# Patient Record
Sex: FEMALE | Race: WHITE | Employment: UNEMPLOYED | URBAN - METROPOLITAN AREA
[De-identification: names, ages, dates, MRNs, and addresses within clinical notes are randomized per-mention and may not be internally consistent; named-entity substitution may affect disease eponyms.]

---

## 2022-07-18 ENCOUNTER — HOSPITAL ENCOUNTER (EMERGENCY)
Age: 52
Discharge: HOME | End: 2022-07-19
Payer: MEDICARE

## 2022-07-18 ENCOUNTER — APPOINTMENT (OUTPATIENT)
Dept: CT IMAGING | Age: 52
End: 2022-07-18
Payer: MEDICARE

## 2022-07-18 DIAGNOSIS — R55 PRE-SYNCOPE: Primary | ICD-10-CM

## 2022-07-18 DIAGNOSIS — G93.89 ENCEPHALOMALACIA ON IMAGING STUDY: ICD-10-CM

## 2022-07-18 DIAGNOSIS — I69.30 HISTORY OF ISCHEMIC CEREBROVASCULAR ACCIDENT (CVA) WITH RESIDUAL DEFICIT: ICD-10-CM

## 2022-07-18 LAB
ANION GAP SERPL CALCULATED.3IONS-SCNC: 14 MEQ/L (ref 9–15)
APTT: 30 SEC (ref 24.4–36.8)
BASOPHILS ABSOLUTE: 0.1 K/UL (ref 0–0.2)
BASOPHILS RELATIVE PERCENT: 0.7 %
BUN BLDV-MCNC: 7 MG/DL (ref 6–20)
CALCIUM SERPL-MCNC: 9.5 MG/DL (ref 8.5–9.9)
CHLORIDE BLD-SCNC: 99 MEQ/L (ref 95–107)
CO2: 23 MEQ/L (ref 20–31)
CREAT SERPL-MCNC: 0.47 MG/DL (ref 0.5–0.9)
EOSINOPHILS ABSOLUTE: 0.2 K/UL (ref 0–0.7)
EOSINOPHILS RELATIVE PERCENT: 2.3 %
GFR AFRICAN AMERICAN: >60
GFR NON-AFRICAN AMERICAN: >60
GLUCOSE BLD-MCNC: 96 MG/DL (ref 70–99)
HCT VFR BLD CALC: 37.6 % (ref 37–47)
HEMOGLOBIN: 12.1 G/DL (ref 12–16)
INR BLD: 0.9
LYMPHOCYTES ABSOLUTE: 1.5 K/UL (ref 1–4.8)
LYMPHOCYTES RELATIVE PERCENT: 16 %
MCH RBC QN AUTO: 26.8 PG (ref 27–31.3)
MCHC RBC AUTO-ENTMCNC: 32.3 % (ref 33–37)
MCV RBC AUTO: 83.1 FL (ref 82–100)
MONOCYTES ABSOLUTE: 0.6 K/UL (ref 0.2–0.8)
MONOCYTES RELATIVE PERCENT: 6 %
NEUTROPHILS ABSOLUTE: 7.2 K/UL (ref 1.4–6.5)
NEUTROPHILS RELATIVE PERCENT: 75 %
PDW BLD-RTO: 16.2 % (ref 11.5–14.5)
PLATELET # BLD: 272 K/UL (ref 130–400)
POTASSIUM REFLEX MAGNESIUM: 4.3 MEQ/L (ref 3.4–4.9)
PROTHROMBIN TIME: 12.6 SEC (ref 12.3–14.9)
RBC # BLD: 4.52 M/UL (ref 4.2–5.4)
SODIUM BLD-SCNC: 136 MEQ/L (ref 135–144)
TROPONIN: <0.01 NG/ML (ref 0–0.01)
WBC # BLD: 9.6 K/UL (ref 4.8–10.8)

## 2022-07-18 PROCEDURE — 84484 ASSAY OF TROPONIN QUANT: CPT

## 2022-07-18 PROCEDURE — 99284 EMERGENCY DEPT VISIT MOD MDM: CPT

## 2022-07-18 PROCEDURE — 80048 BASIC METABOLIC PNL TOTAL CA: CPT

## 2022-07-18 PROCEDURE — 93005 ELECTROCARDIOGRAM TRACING: CPT

## 2022-07-18 PROCEDURE — 70450 CT HEAD/BRAIN W/O DYE: CPT

## 2022-07-18 PROCEDURE — 85025 COMPLETE CBC W/AUTO DIFF WBC: CPT

## 2022-07-18 PROCEDURE — 85610 PROTHROMBIN TIME: CPT

## 2022-07-18 PROCEDURE — 85730 THROMBOPLASTIN TIME PARTIAL: CPT

## 2022-07-18 PROCEDURE — 36415 COLL VENOUS BLD VENIPUNCTURE: CPT

## 2022-07-18 ASSESSMENT — ENCOUNTER SYMPTOMS
COUGH: 0
NAUSEA: 0
DIARRHEA: 0
VOMITING: 0
PHOTOPHOBIA: 0
ABDOMINAL PAIN: 0
SHORTNESS OF BREATH: 0

## 2022-07-18 ASSESSMENT — PAIN - FUNCTIONAL ASSESSMENT
PAIN_FUNCTIONAL_ASSESSMENT: NONE - DENIES PAIN
PAIN_FUNCTIONAL_ASSESSMENT: NONE - DENIES PAIN

## 2022-07-19 VITALS
WEIGHT: 190 LBS | SYSTOLIC BLOOD PRESSURE: 108 MMHG | DIASTOLIC BLOOD PRESSURE: 73 MMHG | BODY MASS INDEX: 34.96 KG/M2 | OXYGEN SATURATION: 96 % | HEIGHT: 62 IN | RESPIRATION RATE: 16 BRPM | HEART RATE: 64 BPM | TEMPERATURE: 98.4 F

## 2022-07-19 LAB
EKG ATRIAL RATE: 65 BPM
EKG P AXIS: 98 DEGREES
EKG P-R INTERVAL: 208 MS
EKG Q-T INTERVAL: 488 MS
EKG QRS DURATION: 86 MS
EKG QTC CALCULATION (BAZETT): 507 MS
EKG R AXIS: -20 DEGREES
EKG T AXIS: 3 DEGREES
EKG VENTRICULAR RATE: 65 BPM

## 2022-07-19 PROCEDURE — 93010 ELECTROCARDIOGRAM REPORT: CPT | Performed by: INTERNAL MEDICINE

## 2022-07-19 NOTE — ED NOTES
D/C instructions explained to patient who voices understanding. Patient left ED via cot/[facility] ambulance.      Yonatan Marks RN  07/19/22 3313

## 2022-07-19 NOTE — ED NOTES
Pt stable, a&ox4, skin w/d/pink, resting in bed, 0 problems, 0 c/o. 0 pain.      Flip Monzon, TAY  07/19/22 9479

## 2022-07-19 NOTE — ED PROVIDER NOTES
[address] [facility] ED  eMERGENCY dEPARTMENT eNCOUnter      Pt Name: Vance Guerrero  MRN: [id number]  [place] 1970  Date of evaluation: 7/18/2022  Provider: WOLFGANG Rolle        HISTORY OF PRESENT ILLNESS    Vance Guerrero is a 45 y.o. 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 Patient presents emergency department with daughters for 10-minute episode of weakness/change in baseline status that occurred just prior to arrival.  Reportedly patient was given a suppository, sitting on the toilet, family went to check on her and patient was unable to speak, unable to control her secretions she was drooling, unable to move both of her arms. Was also reporting decrease sensation in both forearms. At baseline she really does not move either of her legs but has good movement of her arms and intact sensation. Daughters states she has \"improved with her locked-in syndrome. \" At baseline and at present, she is able to form words with her mouth/soft speech with aphasia, so not being able to speak was a change. Again this lasted for about 10 minutes and then she spontaneously came out of it. Patient states \"yes\" when asked if she remembers the event. By time of arrival to the ED family reporting she is at her baseline. Patient denying headache, lightheadedness, confusion, dizziness, increased weakness, numbness. No seizure history. Did not fall pass out or sustain injury. Daughter reports they recently moved here so most of her care/stroke documentation was in another state unable to review on chart. REVIEW OF SYSTEMS       Review of Systems   Constitutional:  Negative for chills, diaphoresis and fever. HENT:  Negative for congestion. Eyes:  Negative for photophobia and visual disturbance. Respiratory:  Negative for cough and shortness of breath. Cardiovascular:  Negative for chest pain.    Gastrointestinal:  Negative for abdominal pain, diarrhea, Eyes:      Extraocular Movements: Extraocular movements intact. Cardiovascular:      Rate and Rhythm: Normal rate and regular rhythm. Pulmonary:      Effort: Pulmonary effort is normal. No respiratory distress. Breath sounds: Normal breath sounds. Abdominal:      General: Bowel sounds are normal.      Palpations: Abdomen is soft. Tenderness: There is no abdominal tenderness. Musculoskeletal:         General: Normal range of motion. Cervical back: Normal range of motion. Skin:     General: Skin is warm. Neurological:      Mental Status: She is alert and oriented to person, place, and time. GCS: GCS eye subscore is 4. GCS verbal subscore is 5. GCS motor subscore is 6. Cranial Nerves: Cranial nerve deficit (dysphagia baseline from CVA) and facial asymmetry (right sided droop when smiling family states baseline from CVA) present. Sensory: Sensation is intact. Motor: Weakness (bilateral lower extremity weakness cannot lift off of bed family states is baseline, strong and equal push/pulls; good strength and movement of bilateral arms equally) present.    Psychiatric:         Mood and Affect: Mood normal.         Behavior: Behavior normal.         LABS:  Labs Reviewed   CBC WITH AUTO DIFFERENTIAL - Abnormal; Notable for the following components:       Result Value    MCH 26.8 (*)     MCHC 32.3 (*)     RDW 16.2 (*)     Neutrophils Absolute 7.2 (*)     All other components within normal limits   BASIC METABOLIC PANEL W/ REFLEX TO MG FOR LOW K - Abnormal; Notable for the following components:    CREATININE 0.47 (*)     All other components within normal limits   TROPONIN   PROTIME-INR   APTT     EKG NSR HR 65 low voltage QRS regular intervals QT/Qtc 488/507 no stemi    MDM:   Vitals:    Vitals:    07/18/22 2209 07/18/22 2230 07/19/22 0000 07/19/22 0030   BP:  114/81 115/80 108/73   Pulse: 67 66 65 64   Resp: 11 11 15 16   Temp:       TempSrc:       SpO2: 95% 96% 96% 96% Weight:       Height:           50-year-old female patient with history of CVA 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 Patient presents with daughter to help provide history as patient is aphasic at baseline. On examination do note the aphasia, slight right-sided facial droop on smiling, inability to move both of her legs off the bed, all which is baseline per daughters. She is reporting intact sensation, she does have good push/pulls of her bilateral legs, good movement and strength of her bilateral arms. Patient denies all complaint, states she remembers the entire event, daughters states she is at her baseline. On patient arrival discussed with Dr. Frances Quesada with work-up recommendations utilized. CT head is negative for acute findings, does demonstrate encephalomalacia in the carl. No ischemic changes on EKG, no troponin increase. Otherwise unremarkable contributing laboratory studies. Patient remains at her baseline while in the ED, no acute distress. No further episodes. Did discuss with neurology Dr. Lori Glover who states primary consideration at this time seizure-like activity. Patient and daughters continue to deny any history of seizure activity. Dr. Lori Glover stating CT head findings are noncontributory to his seizure. Recommends patient can go home and follow-up outpatient with PCP as well as establish in his office. I discussed this at length with daughters and precautions for return, they will contact PCP tomorrow and contact neurology office for close follow-up. CRITICAL CARE TIME   Total CriticalCare time was 0 minutes, excluding separately reportable procedures. There was a high probability of clinically significant/life threatening deterioration in the patient's condition which required my urgent intervention. PROCEDURES:  Unlessotherwise noted below, none      Procedures      FINAL IMPRESSION      1. Pre-syncope    2. History of ischemic cerebrovascular accident (CVA) with residual deficit    3.  Encephalomalacia on imaging study          DISPOSITION/PLAN   DISPOSITION Decision To Discharge 07/19/2022 01:22:49 AM          Leotha Cockayne, PA (electronically signed)  Attending Emergency Physician          Leotha Cockayne, [place]  07/19/22 0409

## 2023-03-02 ENCOUNTER — HOME VISIT (OUTPATIENT)
Dept: PRIMARY CARE | Facility: CLINIC | Age: 53
End: 2023-03-02

## 2023-03-02 DIAGNOSIS — F41.8 DEPRESSION WITH ANXIETY: ICD-10-CM

## 2023-03-02 DIAGNOSIS — I69.30 SEQUELAE OF CEREBRAL INFARCTION: Primary | ICD-10-CM

## 2023-03-02 DIAGNOSIS — I10 SYSTEMIC PRIMARY ARTERIAL HYPERTENSION: ICD-10-CM

## 2023-03-02 DIAGNOSIS — E78.00 HYPERCHOLESTEREMIA: ICD-10-CM

## 2023-03-14 DIAGNOSIS — K21.9 GASTROESOPHAGEAL REFLUX DISEASE WITHOUT ESOPHAGITIS: Primary | ICD-10-CM

## 2023-03-14 DIAGNOSIS — K21.9 GASTROESOPHAGEAL REFLUX DISEASE, UNSPECIFIED WHETHER ESOPHAGITIS PRESENT: ICD-10-CM

## 2023-03-14 RX ORDER — FAMOTIDINE 20 MG/1
20 TABLET, FILM COATED ORAL DAILY
COMMUNITY
Start: 2021-06-28 | End: 2023-03-14 | Stop reason: SDUPTHER

## 2023-03-14 RX ORDER — FAMOTIDINE 20 MG/1
TABLET, FILM COATED ORAL
Qty: 90 TABLET | Refills: 0 | Status: SHIPPED | OUTPATIENT
Start: 2023-03-14 | End: 2023-12-18 | Stop reason: ALTCHOICE

## 2023-03-14 RX ORDER — FAMOTIDINE 20 MG/1
20 TABLET, FILM COATED ORAL DAILY
Qty: 90 TABLET | Refills: 1 | Status: SHIPPED | OUTPATIENT
Start: 2023-03-14 | End: 2023-08-23 | Stop reason: SDUPTHER

## 2023-04-11 DIAGNOSIS — F32.A DEPRESSION, UNSPECIFIED DEPRESSION TYPE: ICD-10-CM

## 2023-04-11 DIAGNOSIS — E78.5 HYPERLIPIDEMIA, UNSPECIFIED HYPERLIPIDEMIA TYPE: ICD-10-CM

## 2023-04-11 RX ORDER — ATORVASTATIN CALCIUM 80 MG/1
80 TABLET, FILM COATED ORAL DAILY
COMMUNITY
Start: 2021-06-28 | End: 2023-06-27 | Stop reason: SDUPTHER

## 2023-04-11 RX ORDER — CITALOPRAM 40 MG/1
TABLET, FILM COATED ORAL
Qty: 90 TABLET | Refills: 0 | Status: SHIPPED | OUTPATIENT
Start: 2023-04-11 | End: 2023-09-27

## 2023-04-11 RX ORDER — ATORVASTATIN CALCIUM 80 MG/1
TABLET, FILM COATED ORAL
Qty: 90 TABLET | Refills: 0 | Status: SHIPPED | OUTPATIENT
Start: 2023-04-11 | End: 2023-12-12 | Stop reason: SDUPTHER

## 2023-04-11 RX ORDER — CITALOPRAM 40 MG/1
40 TABLET, FILM COATED ORAL DAILY
COMMUNITY
Start: 2021-06-28 | End: 2023-08-23 | Stop reason: SDUPTHER

## 2023-05-24 LAB
APPEARANCE, URINE: CLEAR
BILIRUBIN, URINE: NEGATIVE
BLOOD, URINE: NEGATIVE
COLOR, URINE: YELLOW
GLUCOSE, URINE: NEGATIVE MG/DL
KETONES, URINE: NEGATIVE MG/DL
LEUKOCYTE ESTERASE, URINE: NEGATIVE
NITRITE, URINE: NEGATIVE
PH, URINE: 6 (ref 5–8)
PROTEIN, URINE: NEGATIVE MG/DL
SPECIFIC GRAVITY, URINE: 1.01 (ref 1–1.03)
UROBILINOGEN, URINE: <2 MG/DL (ref 0–1.9)

## 2023-05-25 LAB — URINE CULTURE: NORMAL

## 2023-06-13 DIAGNOSIS — F41.8 DEPRESSION WITH ANXIETY: ICD-10-CM

## 2023-06-13 RX ORDER — TRAZODONE HYDROCHLORIDE 100 MG/1
100 TABLET ORAL NIGHTLY
COMMUNITY
End: 2023-06-13 | Stop reason: SDUPTHER

## 2023-06-13 RX ORDER — TRAZODONE HYDROCHLORIDE 100 MG/1
100 TABLET ORAL NIGHTLY
Qty: 90 TABLET | Refills: 1 | Status: SHIPPED | OUTPATIENT
Start: 2023-06-13 | End: 2023-12-12

## 2023-06-19 DIAGNOSIS — Z00.00 ROUTINE GENERAL MEDICAL EXAMINATION AT A HEALTH CARE FACILITY: ICD-10-CM

## 2023-06-19 RX ORDER — FOLIC ACID 1 MG/1
TABLET ORAL
Qty: 90 TABLET | Refills: 0 | Status: SHIPPED | OUTPATIENT
Start: 2023-06-19 | End: 2023-06-27

## 2023-06-23 LAB
APPEARANCE, URINE: CLEAR
BILIRUBIN, URINE: NEGATIVE
BLOOD, URINE: NEGATIVE
COLOR, URINE: NORMAL
GLUCOSE, URINE: NEGATIVE MG/DL
KETONES, URINE: NEGATIVE MG/DL
LEUKOCYTE ESTERASE, URINE: NEGATIVE
NITRITE, URINE: NEGATIVE
PH, URINE: 7 (ref 5–8)
PROTEIN, URINE: NEGATIVE MG/DL
SPECIFIC GRAVITY, URINE: 1 (ref 1–1.03)
UROBILINOGEN, URINE: <2 MG/DL (ref 0–1.9)

## 2023-06-24 LAB — URINE CULTURE: NO GROWTH

## 2023-06-27 DIAGNOSIS — Z00.00 ROUTINE GENERAL MEDICAL EXAMINATION AT A HEALTH CARE FACILITY: ICD-10-CM

## 2023-06-27 DIAGNOSIS — M62.838 MUSCLE SPASM: ICD-10-CM

## 2023-06-27 RX ORDER — BACLOFEN 10 MG/1
10 TABLET ORAL 3 TIMES DAILY PRN
Qty: 270 TABLET | Refills: 1 | Status: SHIPPED | OUTPATIENT
Start: 2023-06-27 | End: 2024-01-10 | Stop reason: SDUPTHER

## 2023-06-27 RX ORDER — BACLOFEN 10 MG/1
10 TABLET ORAL 3 TIMES DAILY PRN
COMMUNITY
End: 2023-06-27 | Stop reason: SDUPTHER

## 2023-06-27 RX ORDER — FOLIC ACID 1 MG/1
TABLET ORAL
Qty: 90 TABLET | Refills: 0 | Status: SHIPPED | OUTPATIENT
Start: 2023-06-27 | End: 2023-12-28 | Stop reason: SDUPTHER

## 2023-06-28 ENCOUNTER — HOSPITAL ENCOUNTER (EMERGENCY)
Age: 53
Discharge: HOME | End: 2023-06-28
Payer: MEDICARE

## 2023-06-28 ENCOUNTER — APPOINTMENT (OUTPATIENT)
Dept: GENERAL RADIOLOGY | Age: 53
End: 2023-06-28
Payer: MEDICARE

## 2023-06-28 VITALS
DIASTOLIC BLOOD PRESSURE: 107 MMHG | WEIGHT: 200 LBS | HEIGHT: 62 IN | RESPIRATION RATE: 20 BRPM | SYSTOLIC BLOOD PRESSURE: 157 MMHG | OXYGEN SATURATION: 99 % | HEART RATE: 99 BPM | TEMPERATURE: 99.1 F | BODY MASS INDEX: 36.8 KG/M2

## 2023-06-28 DIAGNOSIS — B34.9 VIRAL ILLNESS: Primary | ICD-10-CM

## 2023-06-28 DIAGNOSIS — R50.9 FEVER, UNSPECIFIED FEVER CAUSE: ICD-10-CM

## 2023-06-28 LAB
ALBUMIN SERPL-MCNC: 4 G/DL (ref 3.5–4.6)
ALP SERPL-CCNC: 111 U/L (ref 40–130)
ALT SERPL-CCNC: 22 U/L (ref 0–33)
ANION GAP SERPL CALCULATED.3IONS-SCNC: 14 MEQ/L (ref 9–15)
AST SERPL-CCNC: 21 U/L (ref 0–35)
B PARAP IS1001 DNA NPH QL NAA+NON-PROBE: NOT DETECTED
B PERT.PT PRMT NPH QL NAA+NON-PROBE: NOT DETECTED
BASOPHILS # BLD: 0.1 K/UL (ref 0–0.2)
BASOPHILS NFR BLD: 0.5 %
BILIRUB SERPL-MCNC: 0.7 MG/DL (ref 0.2–0.7)
BILIRUB UR QL STRIP: NEGATIVE
BUN SERPL-MCNC: 10 MG/DL (ref 6–20)
C PNEUM DNA NPH QL NAA+NON-PROBE: NOT DETECTED
CALCIUM SERPL-MCNC: 9.2 MG/DL (ref 8.5–9.9)
CHLORIDE SERPL-SCNC: 100 MEQ/L (ref 95–107)
CLARITY UR: CLEAR
CO2 SERPL-SCNC: 21 MEQ/L (ref 20–31)
COLOR UR: YELLOW
CREAT SERPL-MCNC: 0.58 MG/DL (ref 0.5–0.9)
EOSINOPHIL # BLD: 0.1 K/UL (ref 0–0.7)
EOSINOPHIL NFR BLD: 0.8 %
ERYTHROCYTE [DISTWIDTH] IN BLOOD BY AUTOMATED COUNT: 16.2 % (ref 11.5–14.5)
FLUAV RNA NPH QL NAA+NON-PROBE: NOT DETECTED
FLUBV RNA NPH QL NAA+NON-PROBE: NOT DETECTED
GLOBULIN SER CALC-MCNC: 2.9 G/DL (ref 2.3–3.5)
GLUCOSE SERPL-MCNC: 128 MG/DL (ref 70–99)
GLUCOSE UR STRIP-MCNC: NEGATIVE MG/DL
HADV DNA NPH QL NAA+NON-PROBE: NOT DETECTED
HCOV 229E RNA NPH QL NAA+NON-PROBE: NOT DETECTED
HCOV HKU1 RNA NPH QL NAA+NON-PROBE: NOT DETECTED
HCOV NL63 RNA NPH QL NAA+NON-PROBE: NOT DETECTED
HCOV OC43 RNA NPH QL NAA+NON-PROBE: NOT DETECTED
HCT VFR BLD AUTO: 39.4 % (ref 37–47)
HGB BLD-MCNC: 12.4 G/DL (ref 12–16)
HGB UR QL STRIP: NEGATIVE
HMPV RNA NPH QL NAA+NON-PROBE: NOT DETECTED
HPIV1 RNA NPH QL NAA+NON-PROBE: NOT DETECTED
HPIV2 RNA NPH QL NAA+NON-PROBE: NOT DETECTED
HPIV3 RNA NPH QL NAA+NON-PROBE: NOT DETECTED
HPIV4 RNA NPH QL NAA+NON-PROBE: NOT DETECTED
KETONES UR STRIP-MCNC: NEGATIVE MG/DL
LACTATE BLDV-SCNC: 1.7 MMOL/L (ref 0.5–2.2)
LEUKOCYTE ESTERASE UR QL STRIP: NEGATIVE
LYMPHOCYTES # BLD: 0.8 K/UL (ref 1–4.8)
LYMPHOCYTES NFR BLD: 5.7 %
M PNEUMO DNA NPH QL NAA+NON-PROBE: NOT DETECTED
MAGNESIUM SERPL-MCNC: 1.9 MG/DL (ref 1.7–2.4)
MCH RBC QN AUTO: 27.4 PG (ref 27–31.3)
MCHC RBC AUTO-ENTMCNC: 31.5 % (ref 33–37)
MCV RBC AUTO: 87 FL (ref 79.4–94.8)
MONOCYTES # BLD: 0.5 K/UL (ref 0.2–0.8)
MONOCYTES NFR BLD: 3.5 %
NEUTROPHILS # BLD: 12.6 K/UL (ref 1.4–6.5)
NEUTS SEG NFR BLD: 89.5 %
NITRITE UR QL STRIP: NEGATIVE
PH UR STRIP: 5.5 [PH] (ref 5–9)
PLATELET # BLD AUTO: 282 K/UL (ref 130–400)
POTASSIUM SERPL-SCNC: 3.7 MEQ/L (ref 3.4–4.9)
PROCALCITONIN SERPL IA-MCNC: <0.02 NG/ML (ref 0–0.15)
PROT SERPL-MCNC: 6.9 G/DL (ref 6.3–8)
PROT UR STRIP-MCNC: NEGATIVE MG/DL
RBC # BLD AUTO: 4.53 M/UL (ref 4.2–5.4)
RSV RNA NPH QL NAA+NON-PROBE: NOT DETECTED
RV+EV RNA NPH QL NAA+NON-PROBE: NOT DETECTED
SARS-COV-2 RNA NPH QL NAA+NON-PROBE: NOT DETECTED
SODIUM SERPL-SCNC: 135 MEQ/L (ref 135–144)
SP GR UR STRIP: 1.01 (ref 1–1.03)
TROPONIN T SERPL-MCNC: <0.01 NG/ML (ref 0–0.01)
URINE REFLEX TO CULTURE: NORMAL
UROBILINOGEN UR STRIP-ACNC: 1 E.U./DL
WBC # BLD AUTO: 14 K/UL (ref 4.8–10.8)

## 2023-06-28 PROCEDURE — 83605 ASSAY OF LACTIC ACID: CPT

## 2023-06-28 PROCEDURE — 36415 COLL VENOUS BLD VENIPUNCTURE: CPT

## 2023-06-28 PROCEDURE — 99285 EMERGENCY DEPT VISIT HI MDM: CPT

## 2023-06-28 PROCEDURE — 83735 ASSAY OF MAGNESIUM: CPT

## 2023-06-28 PROCEDURE — 0202U NFCT DS 22 TRGT SARS-COV-2: CPT

## 2023-06-28 PROCEDURE — 81003 URINALYSIS AUTO W/O SCOPE: CPT

## 2023-06-28 PROCEDURE — 71045 X-RAY EXAM CHEST 1 VIEW: CPT

## 2023-06-28 PROCEDURE — 2580000003 HC RX 258

## 2023-06-28 PROCEDURE — 87040 BLOOD CULTURE FOR BACTERIA: CPT

## 2023-06-28 PROCEDURE — 85025 COMPLETE CBC W/AUTO DIFF WBC: CPT

## 2023-06-28 PROCEDURE — 80053 COMPREHEN METABOLIC PANEL: CPT

## 2023-06-28 PROCEDURE — 84145 PROCALCITONIN (PCT): CPT

## 2023-06-28 PROCEDURE — 93005 ELECTROCARDIOGRAM TRACING: CPT

## 2023-06-28 PROCEDURE — 84484 ASSAY OF TROPONIN QUANT: CPT

## 2023-06-28 RX ORDER — 0.9 % SODIUM CHLORIDE 0.9 %
1000 INTRAVENOUS SOLUTION INTRAVENOUS ONCE
Status: COMPLETED | OUTPATIENT
Start: 2023-06-28 | End: 2023-06-28

## 2023-06-28 RX ADMIN — SODIUM CHLORIDE 1000 ML: 9 INJECTION, SOLUTION INTRAVENOUS at 04:03

## 2023-06-28 ASSESSMENT — ENCOUNTER SYMPTOMS
CONSTIPATION: 0
ABDOMINAL PAIN: 0
ALLERGIC/IMMUNOLOGIC NEGATIVE: 1
TROUBLE SWALLOWING: 0
DIARRHEA: 0
EYE PAIN: 0
NAUSEA: 1
COUGH: 1
EYE ITCHING: 0
SHORTNESS OF BREATH: 0
VOMITING: 0
SORE THROAT: 0
EYE DISCHARGE: 0
RHINORRHEA: 1

## 2023-06-28 ASSESSMENT — LIFESTYLE VARIABLES: HOW MANY STANDARD DRINKS CONTAINING ALCOHOL DO YOU HAVE ON A TYPICAL DAY: PATIENT DOES NOT DRINK

## 2023-06-28 ASSESSMENT — PAIN - FUNCTIONAL ASSESSMENT: PAIN_FUNCTIONAL_ASSESSMENT: NONE - DENIES PAIN

## 2023-06-29 LAB
BACTERIA BLD CULT ORG #2: NORMAL
BACTERIA BLD CULT: NORMAL
EKG ATRIAL RATE: 95 BPM
EKG P AXIS: 25 DEGREES
EKG P-R INTERVAL: 196 MS
EKG Q-T INTERVAL: 392 MS
EKG QRS DURATION: 76 MS
EKG QTC CALCULATION (BAZETT): 492 MS
EKG R AXIS: -25 DEGREES
EKG T AXIS: 65 DEGREES
EKG VENTRICULAR RATE: 95 BPM

## 2023-06-29 PROCEDURE — 93010 ELECTROCARDIOGRAM REPORT: CPT | Performed by: INTERNAL MEDICINE

## 2023-07-03 LAB
BACTERIA BLD CULT ORG #2: NORMAL
BACTERIA BLD CULT: NORMAL

## 2023-08-23 ENCOUNTER — LAB (OUTPATIENT)
Dept: LAB | Facility: LAB | Age: 53
End: 2023-08-23
Payer: COMMERCIAL

## 2023-08-23 ENCOUNTER — OFFICE VISIT (OUTPATIENT)
Dept: PRIMARY CARE | Facility: CLINIC | Age: 53
End: 2023-08-23
Payer: COMMERCIAL

## 2023-08-23 VITALS
TEMPERATURE: 98 F | WEIGHT: 200 LBS | BODY MASS INDEX: 36.8 KG/M2 | HEIGHT: 62 IN | SYSTOLIC BLOOD PRESSURE: 116 MMHG | HEART RATE: 64 BPM | DIASTOLIC BLOOD PRESSURE: 78 MMHG

## 2023-08-23 DIAGNOSIS — E78.2 MIXED HYPERLIPIDEMIA: ICD-10-CM

## 2023-08-23 DIAGNOSIS — R53.83 FATIGUE, UNSPECIFIED TYPE: ICD-10-CM

## 2023-08-23 DIAGNOSIS — K94.23 PEG TUBE MALFUNCTION (MULTI): ICD-10-CM

## 2023-08-23 DIAGNOSIS — R53.83 OTHER FATIGUE: ICD-10-CM

## 2023-08-23 DIAGNOSIS — G47.09 OTHER INSOMNIA: ICD-10-CM

## 2023-08-23 DIAGNOSIS — I69.320 APHASIA FOLLOWING CEREBRAL INFARCTION: ICD-10-CM

## 2023-08-23 DIAGNOSIS — E55.9 VITAMIN D DEFICIENCY: ICD-10-CM

## 2023-08-23 DIAGNOSIS — Z00.00 ANNUAL PHYSICAL EXAM: Primary | ICD-10-CM

## 2023-08-23 DIAGNOSIS — M62.838 MUSCLE SPASTICITY: ICD-10-CM

## 2023-08-23 DIAGNOSIS — Z86.73 HISTORY OF COMPLETED STROKE: ICD-10-CM

## 2023-08-23 PROBLEM — K21.9 ACID REFLUX: Status: ACTIVE | Noted: 2023-08-23

## 2023-08-23 PROBLEM — I10 HTN (HYPERTENSION): Status: ACTIVE | Noted: 2023-01-07

## 2023-08-23 PROBLEM — F41.9 ANXIETY: Status: ACTIVE | Noted: 2023-08-23

## 2023-08-23 PROBLEM — E78.5 HYPERLIPIDEMIA: Status: ACTIVE | Noted: 2023-01-07

## 2023-08-23 PROBLEM — G47.00 INSOMNIA: Status: ACTIVE | Noted: 2023-08-23

## 2023-08-23 LAB
ALANINE AMINOTRANSFERASE (SGPT) (U/L) IN SER/PLAS: 11 U/L (ref 7–45)
ALBUMIN (G/DL) IN SER/PLAS: 4.2 G/DL (ref 3.4–5)
ALKALINE PHOSPHATASE (U/L) IN SER/PLAS: 94 U/L (ref 33–110)
ANION GAP IN SER/PLAS: 12 MMOL/L (ref 10–20)
ASPARTATE AMINOTRANSFERASE (SGOT) (U/L) IN SER/PLAS: 18 U/L (ref 9–39)
BASOPHILS (10*3/UL) IN BLOOD BY AUTOMATED COUNT: 0.08 X10E9/L (ref 0–0.1)
BASOPHILS/100 LEUKOCYTES IN BLOOD BY AUTOMATED COUNT: 1 % (ref 0–2)
BILIRUBIN TOTAL (MG/DL) IN SER/PLAS: 0.8 MG/DL (ref 0–1.2)
CALCIDIOL (25 OH VITAMIN D3) (NG/ML) IN SER/PLAS: 13 NG/ML
CALCIUM (MG/DL) IN SER/PLAS: 9.4 MG/DL (ref 8.6–10.3)
CARBON DIOXIDE, TOTAL (MMOL/L) IN SER/PLAS: 26 MMOL/L (ref 21–32)
CHLORIDE (MMOL/L) IN SER/PLAS: 104 MMOL/L (ref 98–107)
CHOLESTEROL (MG/DL) IN SER/PLAS: 225 MG/DL (ref 0–199)
CHOLESTEROL IN HDL (MG/DL) IN SER/PLAS: 52.6 MG/DL
CHOLESTEROL/HDL RATIO: 4.3
CREATININE (MG/DL) IN SER/PLAS: 0.55 MG/DL (ref 0.5–1.05)
EOSINOPHILS (10*3/UL) IN BLOOD BY AUTOMATED COUNT: 0.51 X10E9/L (ref 0–0.7)
EOSINOPHILS/100 LEUKOCYTES IN BLOOD BY AUTOMATED COUNT: 6.5 % (ref 0–6)
ERYTHROCYTE DISTRIBUTION WIDTH (RATIO) BY AUTOMATED COUNT: 14.6 % (ref 11.5–14.5)
ERYTHROCYTE MEAN CORPUSCULAR HEMOGLOBIN CONCENTRATION (G/DL) BY AUTOMATED: 30.8 G/DL (ref 32–36)
ERYTHROCYTE MEAN CORPUSCULAR VOLUME (FL) BY AUTOMATED COUNT: 91 FL (ref 80–100)
ERYTHROCYTES (10*6/UL) IN BLOOD BY AUTOMATED COUNT: 4.35 X10E12/L (ref 4–5.2)
GFR FEMALE: >90 ML/MIN/1.73M2
GLUCOSE (MG/DL) IN SER/PLAS: 84 MG/DL (ref 74–99)
HEMATOCRIT (%) IN BLOOD BY AUTOMATED COUNT: 39.6 % (ref 36–46)
HEMOGLOBIN (G/DL) IN BLOOD: 12.2 G/DL (ref 12–16)
IMMATURE GRANULOCYTES/100 LEUKOCYTES IN BLOOD BY AUTOMATED COUNT: 0.3 % (ref 0–0.9)
LDL: 160 MG/DL (ref 0–99)
LEUKOCYTES (10*3/UL) IN BLOOD BY AUTOMATED COUNT: 7.9 X10E9/L (ref 4.4–11.3)
LYMPHOCYTES (10*3/UL) IN BLOOD BY AUTOMATED COUNT: 1.71 X10E9/L (ref 1.2–4.8)
LYMPHOCYTES/100 LEUKOCYTES IN BLOOD BY AUTOMATED COUNT: 21.8 % (ref 13–44)
MONOCYTES (10*3/UL) IN BLOOD BY AUTOMATED COUNT: 0.34 X10E9/L (ref 0.1–1)
MONOCYTES/100 LEUKOCYTES IN BLOOD BY AUTOMATED COUNT: 4.3 % (ref 2–10)
NEUTROPHILS (10*3/UL) IN BLOOD BY AUTOMATED COUNT: 5.19 X10E9/L (ref 1.2–7.7)
NEUTROPHILS/100 LEUKOCYTES IN BLOOD BY AUTOMATED COUNT: 66.1 % (ref 40–80)
PLATELETS (10*3/UL) IN BLOOD AUTOMATED COUNT: 281 X10E9/L (ref 150–450)
POTASSIUM (MMOL/L) IN SER/PLAS: 4.3 MMOL/L (ref 3.5–5.3)
PROTEIN TOTAL: 7.1 G/DL (ref 6.4–8.2)
SODIUM (MMOL/L) IN SER/PLAS: 138 MMOL/L (ref 136–145)
TRIGLYCERIDE (MG/DL) IN SER/PLAS: 63 MG/DL (ref 0–149)
UREA NITROGEN (MG/DL) IN SER/PLAS: 8 MG/DL (ref 6–23)
VLDL: 13 MG/DL (ref 0–40)

## 2023-08-23 PROCEDURE — 3074F SYST BP LT 130 MM HG: CPT | Performed by: INTERNAL MEDICINE

## 2023-08-23 PROCEDURE — 3078F DIAST BP <80 MM HG: CPT | Performed by: INTERNAL MEDICINE

## 2023-08-23 PROCEDURE — 99396 PREV VISIT EST AGE 40-64: CPT | Performed by: INTERNAL MEDICINE

## 2023-08-23 PROCEDURE — 80061 LIPID PANEL: CPT

## 2023-08-23 PROCEDURE — 80053 COMPREHEN METABOLIC PANEL: CPT

## 2023-08-23 PROCEDURE — 1036F TOBACCO NON-USER: CPT | Performed by: INTERNAL MEDICINE

## 2023-08-23 PROCEDURE — 3008F BODY MASS INDEX DOCD: CPT | Performed by: INTERNAL MEDICINE

## 2023-08-23 PROCEDURE — 82306 VITAMIN D 25 HYDROXY: CPT

## 2023-08-23 PROCEDURE — 85025 COMPLETE CBC W/AUTO DIFF WBC: CPT

## 2023-08-23 PROCEDURE — 36415 COLL VENOUS BLD VENIPUNCTURE: CPT

## 2023-08-23 ASSESSMENT — ENCOUNTER SYMPTOMS
CONSTITUTIONAL NEGATIVE: 1
RESPIRATORY NEGATIVE: 1
MUSCULOSKELETAL NEGATIVE: 1
GASTROINTESTINAL NEGATIVE: 1

## 2023-08-23 NOTE — PROGRESS NOTES
"Subjective   Patient ID: Idalia Ramsey is a 52 y.o. female who presents for Follow-up (Pt here for office visit).    HPI patient here with her daughter who has history of stroke and hemiparesis and muscle spasms.  She is establishing with neurologist.  She has not fallen in last 6 months.  She is having some issues with clogging of her PEG tube    Review of Systems   Constitutional: Negative.    HENT: Negative.     Respiratory: Negative.     Gastrointestinal: Negative.    Genitourinary: Negative.    Musculoskeletal: Negative.    All other systems reviewed and are negative.      Objective   /78   Pulse 64   Temp 36.7 °C (98 °F) (Temporal)   Ht 1.575 m (5' 2\")   Wt 90.7 kg (200 lb)   LMP 08/01/2023   BMI 36.58 kg/m²     Physical Exam  Vitals reviewed.   Constitutional:       Appearance: Normal appearance.   HENT:      Head: Normocephalic.   Eyes:      Conjunctiva/sclera: Conjunctivae normal.   Cardiovascular:      Rate and Rhythm: Normal rate and regular rhythm.   Pulmonary:      Effort: Pulmonary effort is normal.      Breath sounds: Normal breath sounds.   Abdominal:      General: Abdomen is flat.      Palpations: Abdomen is soft.   Neurological:      General: No focal deficit present.      Mental Status: She is alert.      Cranial Nerves: No cranial nerve deficit.   Psychiatric:         Mood and Affect: Mood normal.         Behavior: Behavior normal.         Thought Content: Thought content normal.       Assessment/Plan   Problem List Items Addressed This Visit       Aphasia following cerebral infarction    Muscle spasticity    Insomnia    History of completed stroke    Annual physical exam - Primary   With patient's current issues she will need to see surgery to use have replacement of her PEG tube she will continue antiplatelet treatment and statins with history of stroke she is on Pepcid for acid reflux she is on trazodone for insomnia she is on baclofen for muscle spasm she will continue " statins for hyperlipidemia and history of massive stroke in the past.  She is a reformed smoker and used to smoke one fourth pack of cigarettes every other day.

## 2023-08-26 DIAGNOSIS — J44.9 CHRONIC OBSTRUCTIVE PULMONARY DISEASE, UNSPECIFIED COPD TYPE (MULTI): ICD-10-CM

## 2023-08-26 DIAGNOSIS — M62.838 SPASM OF MUSCLE: ICD-10-CM

## 2023-08-30 RX ORDER — GABAPENTIN 250 MG/5ML
SOLUTION ORAL
Qty: 470 ML | Refills: 1 | Status: SHIPPED | OUTPATIENT
Start: 2023-08-30 | End: 2024-05-17 | Stop reason: SDUPTHER

## 2023-08-30 RX ORDER — MONTELUKAST SODIUM 10 MG/1
10 TABLET ORAL NIGHTLY
Qty: 90 TABLET | Refills: 1 | Status: SHIPPED | OUTPATIENT
Start: 2023-08-30 | End: 2024-06-04

## 2023-08-31 DIAGNOSIS — E55.9 VITAMIN D DEFICIENCY: ICD-10-CM

## 2023-09-01 RX ORDER — ERGOCALCIFEROL 1.25 MG/1
50000 CAPSULE ORAL
Qty: 4 CAPSULE | Refills: 2 | Status: SHIPPED | OUTPATIENT
Start: 2023-09-01 | End: 2023-12-26

## 2023-09-20 DIAGNOSIS — K94.23 PEG TUBE MALFUNCTION (MULTI): ICD-10-CM

## 2023-09-27 DIAGNOSIS — F32.A DEPRESSION, UNSPECIFIED DEPRESSION TYPE: ICD-10-CM

## 2023-09-27 DIAGNOSIS — I10 HYPERTENSION, UNSPECIFIED TYPE: ICD-10-CM

## 2023-09-27 RX ORDER — AMLODIPINE BESYLATE 10 MG/1
10 TABLET ORAL DAILY
Qty: 90 TABLET | Refills: 1 | Status: SHIPPED | OUTPATIENT
Start: 2023-09-27 | End: 2024-03-15

## 2023-09-27 RX ORDER — CITALOPRAM 40 MG/1
TABLET, FILM COATED ORAL
Qty: 90 TABLET | Refills: 1 | Status: SHIPPED | OUTPATIENT
Start: 2023-09-27 | End: 2024-03-04

## 2023-10-09 ENCOUNTER — APPOINTMENT (OUTPATIENT)
Dept: PRIMARY CARE | Facility: CLINIC | Age: 53
End: 2023-10-09
Payer: COMMERCIAL

## 2023-10-11 ENCOUNTER — HOSPITAL ENCOUNTER (EMERGENCY)
Age: 53
Discharge: HOME | End: 2023-10-11
Payer: MEDICARE

## 2023-10-11 ENCOUNTER — TELEPHONE (OUTPATIENT)
Dept: PRIMARY CARE | Facility: CLINIC | Age: 53
End: 2023-10-11
Payer: COMMERCIAL

## 2023-10-11 ENCOUNTER — APPOINTMENT (OUTPATIENT)
Dept: GENERAL RADIOLOGY | Age: 53
End: 2023-10-11
Payer: MEDICARE

## 2023-10-11 VITALS
TEMPERATURE: 98 F | RESPIRATION RATE: 18 BRPM | DIASTOLIC BLOOD PRESSURE: 75 MMHG | WEIGHT: 200 LBS | HEIGHT: 62 IN | HEART RATE: 72 BPM | SYSTOLIC BLOOD PRESSURE: 120 MMHG | BODY MASS INDEX: 36.8 KG/M2 | OXYGEN SATURATION: 96 %

## 2023-10-11 DIAGNOSIS — M25.519 SHOULDER PAIN, UNSPECIFIED CHRONICITY, UNSPECIFIED LATERALITY: ICD-10-CM

## 2023-10-11 DIAGNOSIS — S46.911A STRAIN OF RIGHT SHOULDER, INITIAL ENCOUNTER: Primary | ICD-10-CM

## 2023-10-11 PROCEDURE — 73030 X-RAY EXAM OF SHOULDER: CPT

## 2023-10-11 PROCEDURE — 99283 EMERGENCY DEPT VISIT LOW MDM: CPT

## 2023-10-11 PROCEDURE — 72040 X-RAY EXAM NECK SPINE 2-3 VW: CPT

## 2023-10-11 RX ORDER — LIDOCAINE 50 MG/G
1 PATCH TOPICAL DAILY
Qty: 15 PATCH | Refills: 0 | Status: SHIPPED | OUTPATIENT
Start: 2023-10-11

## 2023-10-11 ASSESSMENT — PATIENT HEALTH QUESTIONNAIRE - PHQ9
SUM OF ALL RESPONSES TO PHQ QUESTIONS 1-9: 0
SUM OF ALL RESPONSES TO PHQ QUESTIONS 1-9: 0
1. LITTLE INTEREST OR PLEASURE IN DOING THINGS: 0
SUM OF ALL RESPONSES TO PHQ QUESTIONS 1-9: 0
2. FEELING DOWN, DEPRESSED OR HOPELESS: 0
SUM OF ALL RESPONSES TO PHQ QUESTIONS 1-9: 0
SUM OF ALL RESPONSES TO PHQ9 QUESTIONS 1 & 2: 0

## 2023-10-11 ASSESSMENT — ENCOUNTER SYMPTOMS
SHORTNESS OF BREATH: 0
BACK PAIN: 0
ABDOMINAL PAIN: 0
COUGH: 0

## 2023-10-11 ASSESSMENT — PAIN DESCRIPTION - DESCRIPTORS: DESCRIPTORS: ACHING

## 2023-10-11 ASSESSMENT — PAIN - FUNCTIONAL ASSESSMENT: PAIN_FUNCTIONAL_ASSESSMENT: 0-10

## 2023-10-11 ASSESSMENT — PAIN DESCRIPTION - ORIENTATION: ORIENTATION: RIGHT

## 2023-10-11 ASSESSMENT — PAIN SCALES - GENERAL: PAINLEVEL_OUTOF10: 8

## 2023-10-11 ASSESSMENT — PAIN DESCRIPTION - LOCATION: LOCATION: SHOULDER

## 2023-10-11 NOTE — ED PROVIDER NOTES
[facility] ED  eMERGENCY dEPARTMENT eNCOUnter      Pt Name: Tito Trejo  MRN: [id number]  [address] 1970  Date of evaluation: 10/11/2023  Provider: Cherise Boast, APRN - CNP      HISTORY OF PRESENT ILLNESS    Tito Trejo is a 46 y.o. female who presents to the Emergency Department with R shoulder pain that started today. Patient states she was stretching and felt the pain start. Pain is moderate. Patient has some R sided weakness d/t a CVA. She does have R sided deficit with weakness. She denies any injury or trauma. REVIEW OF SYSTEMS       Review of Systems   Constitutional:  Negative for fever. HENT:  Negative for congestion. Respiratory:  Negative for cough and shortness of breath. Cardiovascular:  Negative for chest pain. Gastrointestinal:  Negative for abdominal pain. Genitourinary:  Negative for dysuria. Musculoskeletal:  Negative for arthralgias and back pain. R shoulder pain   Skin:  Negative for rash. All other systems reviewed and are negative. PAST MEDICAL HISTORY   No past medical history on file. SURGICAL HISTORY     No past surgical history on file. CURRENT MEDICATIONS       Previous Medications    No medications on file       ALLERGIES     Bactrim [sulfamethoxazole-trimethoprim], Lisinopril, and Sulfa antibiotics    FAMILY HISTORY     No family history on file.        SOCIAL HISTORY       Social History     Socioeconomic History    Marital status: Single   Tobacco Use    Smoking status: Never     Passive exposure: Never    Smokeless tobacco: Never   Vaping Use    Vaping Use: Never used   Substance and Sexual Activity    Alcohol use: Not Currently    Drug use: Never       SCREENINGS             PHYSICAL EXAM    (up to 7 for level 4, 8 or more for level 5)     ED Triage Vitals   BP Temp Temp src Pulse Respirations SpO2 Height Weight - Scale   10/11/23 1419 10/11/23 1416 -- 10/11/23 1416 10/11/23 1416 10/11/23 1416 10/11/23 1416

## 2023-10-11 NOTE — ED TRIAGE NOTES
Pt presents to ER with right sided shoulder pain that occurred overnight and has not gone away. Pt was given tylenol to help with the pain. Pt is baseline alert and oriented at this time per daughter.

## 2023-10-12 ENCOUNTER — APPOINTMENT (OUTPATIENT)
Dept: RADIOLOGY | Facility: HOSPITAL | Age: 53
End: 2023-10-12
Payer: COMMERCIAL

## 2023-10-12 ENCOUNTER — HOSPITAL ENCOUNTER (EMERGENCY)
Facility: HOSPITAL | Age: 53
Discharge: HOME | End: 2023-10-12
Attending: EMERGENCY MEDICINE
Payer: COMMERCIAL

## 2023-10-12 VITALS
WEIGHT: 195 LBS | OXYGEN SATURATION: 96 % | HEART RATE: 80 BPM | SYSTOLIC BLOOD PRESSURE: 199 MMHG | HEIGHT: 61 IN | RESPIRATION RATE: 16 BRPM | TEMPERATURE: 98.2 F | BODY MASS INDEX: 36.82 KG/M2 | DIASTOLIC BLOOD PRESSURE: 101 MMHG

## 2023-10-12 DIAGNOSIS — S46.911A STRAIN OF RIGHT SHOULDER, INITIAL ENCOUNTER: Primary | ICD-10-CM

## 2023-10-12 LAB
ALBUMIN SERPL BCP-MCNC: 4.1 G/DL (ref 3.4–5)
ALP SERPL-CCNC: 90 U/L (ref 33–110)
ALT SERPL W P-5'-P-CCNC: 14 U/L (ref 7–45)
ANION GAP SERPL CALC-SCNC: 14 MMOL/L (ref 10–20)
APTT PPP: 38 SECONDS (ref 27–38)
AST SERPL W P-5'-P-CCNC: 19 U/L (ref 9–39)
BASOPHILS # BLD AUTO: 0.07 X10*3/UL (ref 0–0.1)
BASOPHILS NFR BLD AUTO: 1 %
BILIRUB SERPL-MCNC: 0.6 MG/DL (ref 0–1.2)
BUN SERPL-MCNC: 10 MG/DL (ref 6–23)
CALCIUM SERPL-MCNC: 9 MG/DL (ref 8.6–10.3)
CHLORIDE SERPL-SCNC: 106 MMOL/L (ref 98–107)
CO2 SERPL-SCNC: 23 MMOL/L (ref 21–32)
CREAT SERPL-MCNC: 0.57 MG/DL (ref 0.5–1.05)
EOSINOPHIL # BLD AUTO: 0.59 X10*3/UL (ref 0–0.7)
EOSINOPHIL NFR BLD AUTO: 8.5 %
ERYTHROCYTE [DISTWIDTH] IN BLOOD BY AUTOMATED COUNT: 15 % (ref 11.5–14.5)
GFR SERPL CREATININE-BSD FRML MDRD: >90 ML/MIN/1.73M*2
GLUCOSE SERPL-MCNC: 90 MG/DL (ref 74–99)
HCT VFR BLD AUTO: 40.1 % (ref 36–46)
HGB BLD-MCNC: 12.8 G/DL (ref 12–16)
IMM GRANULOCYTES # BLD AUTO: 0.02 X10*3/UL (ref 0–0.7)
IMM GRANULOCYTES NFR BLD AUTO: 0.3 % (ref 0–0.9)
INR PPP: 1.1 (ref 0.9–1.1)
LYMPHOCYTES # BLD AUTO: 1.86 X10*3/UL (ref 1.2–4.8)
LYMPHOCYTES NFR BLD AUTO: 26.7 %
MCH RBC QN AUTO: 27.9 PG (ref 26–34)
MCHC RBC AUTO-ENTMCNC: 31.9 G/DL (ref 32–36)
MCV RBC AUTO: 88 FL (ref 80–100)
MONOCYTES # BLD AUTO: 0.42 X10*3/UL (ref 0.1–1)
MONOCYTES NFR BLD AUTO: 6 %
NEUTROPHILS # BLD AUTO: 4 X10*3/UL (ref 1.2–7.7)
NEUTROPHILS NFR BLD AUTO: 57.5 %
NRBC BLD-RTO: 0 /100 WBCS (ref 0–0)
PLATELET # BLD AUTO: 229 X10*3/UL (ref 150–450)
PMV BLD AUTO: 10.1 FL (ref 7.5–11.5)
POTASSIUM SERPL-SCNC: 4.1 MMOL/L (ref 3.5–5.3)
PROT SERPL-MCNC: 7.2 G/DL (ref 6.4–8.2)
PROTHROMBIN TIME: 12.1 SECONDS (ref 9.8–12.8)
RBC # BLD AUTO: 4.58 X10*6/UL (ref 4–5.2)
SODIUM SERPL-SCNC: 139 MMOL/L (ref 136–145)
WBC # BLD AUTO: 7 X10*3/UL (ref 4.4–11.3)

## 2023-10-12 PROCEDURE — 36415 COLL VENOUS BLD VENIPUNCTURE: CPT | Performed by: HOME HEALTH

## 2023-10-12 PROCEDURE — 72125 CT NECK SPINE W/O DYE: CPT | Performed by: RADIOLOGY

## 2023-10-12 PROCEDURE — 70450 CT HEAD/BRAIN W/O DYE: CPT | Performed by: RADIOLOGY

## 2023-10-12 PROCEDURE — 85025 COMPLETE CBC W/AUTO DIFF WBC: CPT | Performed by: HOME HEALTH

## 2023-10-12 PROCEDURE — 99285 EMERGENCY DEPT VISIT HI MDM: CPT | Performed by: EMERGENCY MEDICINE

## 2023-10-12 PROCEDURE — 2500000001 HC RX 250 WO HCPCS SELF ADMINISTERED DRUGS (ALT 637 FOR MEDICARE OP): Performed by: HOME HEALTH

## 2023-10-12 PROCEDURE — 80053 COMPREHEN METABOLIC PANEL: CPT | Performed by: HOME HEALTH

## 2023-10-12 PROCEDURE — 72125 CT NECK SPINE W/O DYE: CPT | Mod: MG

## 2023-10-12 PROCEDURE — 85730 THROMBOPLASTIN TIME PARTIAL: CPT | Performed by: HOME HEALTH

## 2023-10-12 PROCEDURE — 2500000005 HC RX 250 GENERAL PHARMACY W/O HCPCS: Performed by: HOME HEALTH

## 2023-10-12 RX ORDER — LIDOCAINE 50 MG/G
1 PATCH TOPICAL DAILY
Qty: 30 PATCH | Refills: 0 | Status: SHIPPED | OUTPATIENT
Start: 2023-10-12 | End: 2023-10-12 | Stop reason: SDUPTHER

## 2023-10-12 RX ORDER — CYCLOBENZAPRINE HCL 10 MG
5 TABLET ORAL ONCE
Status: COMPLETED | OUTPATIENT
Start: 2023-10-12 | End: 2023-10-12

## 2023-10-12 RX ORDER — LIDOCAINE 560 MG/1
1 PATCH PERCUTANEOUS; TOPICAL; TRANSDERMAL DAILY
Status: DISCONTINUED | OUTPATIENT
Start: 2023-10-12 | End: 2023-10-13 | Stop reason: HOSPADM

## 2023-10-12 RX ORDER — CYCLOBENZAPRINE HCL 5 MG
5 TABLET ORAL 3 TIMES DAILY PRN
Qty: 9 TABLET | Refills: 0 | Status: SHIPPED | OUTPATIENT
Start: 2023-10-12 | End: 2023-10-12 | Stop reason: SDUPTHER

## 2023-10-12 RX ORDER — LIDOCAINE 50 MG/G
1 PATCH TOPICAL DAILY
Qty: 30 PATCH | Refills: 0 | Status: SHIPPED | OUTPATIENT
Start: 2023-10-12

## 2023-10-12 RX ORDER — ASPIRIN 81 MG/1
81 TABLET ORAL EVERY OTHER DAY
COMMUNITY

## 2023-10-12 RX ORDER — CYCLOBENZAPRINE HCL 5 MG
5 TABLET ORAL 3 TIMES DAILY PRN
Qty: 9 TABLET | Refills: 0 | Status: SHIPPED | OUTPATIENT
Start: 2023-10-12 | End: 2023-10-18 | Stop reason: SDUPTHER

## 2023-10-12 RX ADMIN — CYCLOBENZAPRINE HYDROCHLORIDE 5 MG: 10 TABLET, FILM COATED ORAL at 21:15

## 2023-10-12 RX ADMIN — LIDOCAINE 1 PATCH: 4 PATCH TOPICAL at 21:15

## 2023-10-12 ASSESSMENT — LIFESTYLE VARIABLES
HAVE YOU EVER FELT YOU SHOULD CUT DOWN ON YOUR DRINKING: NO
HAVE PEOPLE ANNOYED YOU BY CRITICIZING YOUR DRINKING: NO
EVER HAD A DRINK FIRST THING IN THE MORNING TO STEADY YOUR NERVES TO GET RID OF A HANGOVER: NO
EVER FELT BAD OR GUILTY ABOUT YOUR DRINKING: NO
REASON UNABLE TO ASSESS: NO

## 2023-10-12 ASSESSMENT — COLUMBIA-SUICIDE SEVERITY RATING SCALE - C-SSRS
1. IN THE PAST MONTH, HAVE YOU WISHED YOU WERE DEAD OR WISHED YOU COULD GO TO SLEEP AND NOT WAKE UP?: NO
6. HAVE YOU EVER DONE ANYTHING, STARTED TO DO ANYTHING, OR PREPARED TO DO ANYTHING TO END YOUR LIFE?: NO
2. HAVE YOU ACTUALLY HAD ANY THOUGHTS OF KILLING YOURSELF?: NO

## 2023-10-13 NOTE — ED PROVIDER NOTES
HPI   Chief Complaint   Patient presents with    Arm Injury     Pt arrives with complaints of right sided arm pain and numbness that started yesterday morning. Provider Will in triage to assess pt. Pt states she believes she pulled a muscle       Patient is a 53-year-old female presenting to ED with right shoulder pain.  Past medical history is significant for complete stroke causing aphasia, hyperlipidemia, hypertension, muscle spasticity and acid reflux.  Patient started developing right shoulder pain a couple days ago.  Denies any specific injury or trauma.  Patient is wheelchair-bound and has minimal use of her extremities.  Patient been taking Tylenol with improvement of symptoms.  Patient was also seen at [facility] a couple days ago when she had an x-ray of her shoulder performed with states that the symptoms were likely related to a muscle strain.  Family presents as a expressed concerns for potential stroke given patient's history.  Patient also expresses intermittent tingling sensation down her right arm.  Tingling sensation started around 11 AM yesterday.  Patient is currently not on anticoagulation.  Patient has no associated other symptoms such as headache or dizziness, chest pain or shortness of breath, fever or chills.                          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  Social History     Tobacco Use    Smoking status: Never    Smokeless tobacco: Never   Substance Use Topics    Alcohol use: Never    Drug  use: Never       Physical Exam   ED Triage Vitals [10/12/23 1727]   Temp Heart Rate Resp BP   36.8 °C (98.2 °F) 73 17 (!) 171/97      SpO2 Temp Source Heart Rate Source Patient Position   95 % Temporal -- Sitting      BP Location FiO2 (%)     Left arm --       Physical Exam  Vitals reviewed.   Constitutional:       Appearance: Normal appearance. She is normal weight.   HENT:      Head: Normocephalic.      Nose: Nose normal.      Mouth/Throat:      Mouth: Mucous membranes are moist.      Pharynx: Oropharynx is clear.   Eyes:      Conjunctiva/sclera: Conjunctivae normal.      Pupils: Pupils are equal, round, and reactive to light.   Cardiovascular:      Rate and Rhythm: Normal rate and regular rhythm.      Pulses: Normal pulses.      Heart sounds: Normal heart sounds.   Pulmonary:      Effort: Pulmonary effort is normal.      Breath sounds: Normal breath sounds.   Musculoskeletal:         General: Normal range of motion.      Right shoulder: Tenderness present. Normal strength. Normal pulse.      Cervical back: Normal range of motion.      Comments:  reproducible tenderness along the posterior aspect of the right shoulder in the subscapular region.  At baseline patient is active range of motion is limited.  Strength 5/5 bilateral.  Neurovascular intact distally.   Skin:     General: Skin is warm and dry.   Neurological:      General: No focal deficit present.      Mental Status: She is alert and oriented to person, place, and time. Mental status is at baseline.   Psychiatric:         Mood and Affect: Mood normal.         Behavior: Behavior normal.         Thought Content: Thought content normal.         Judgment: Judgment normal.         ED Course & MDM   Diagnoses as of 10/12/23 2150   Strain of right shoulder, initial encounter       Medical Decision Making  Independent Historians: Patient/family members    MDM:   shared decision making is made.  Plan discussed with patient family members which she agreed to.   Patient is given Flexeril p.o. and Lidoderm patch after initial assessment.  Patient presents to the ED with right shoulder pain.  No specific injury or trauma.  Symptoms started couple days ago.  Patient also reported that she started experiencing intermittent tingling sensation in her right arm around 11 AM yesterday.  Currently denies any tingling sensations.  Family states the patient was seen at [facility] when she had an x-ray of her shoulder they stated that symptoms were likely related to a strain of one of her muscles.  Family presents to the emergency department today due to concerns for potential stroke given her history of strokes.  At baseline patient has aphasia.  She has limited mobility in her bilateral lower extremities as she is wheelchair-bound.  She has limited use of her bilateral upper extremities.  She has a right facial droop.  On my exam there appears to be no new obvious deficits however difficult to assess given the patient's baseline.  clinically have low suspicion of stroke as I discussed with the family primers that they did not notice any other abnormalities other than the pain in her right arm.  Patient has reproducible tenderness in the right subscapular region.  Neurovascular tact distally.  Labs and imaging ordered for evaluate patient symptoms.    My interpretation of lab suggest CBC with no leukocytosis or anemia.  CMP unremarkable with no electrolyte abnormalities, STEPHANIE liver injury.  Coagulation screen unremarkable.  CT scan of the head and suggest markedly limited examination due to artifact.  Redemonstration of encephalomalacia involving the renata compatible with remote infarction.  No separate well delineated acute intracranial abnormalities.  CT of the C-spine that shows no evidence of acute fracture or subluxation of the cervical spine. X-ray of the shoulder yesterday suggest no acute fracture, dislocation or bony lesions.Discussed the signs with the family which I  understand.    To reassess the patient, patient is resting comfortably.  Discussed with patient that symptoms consistent with musculoskeletal injury like related to shoulder strain potentially related to posturing as patient's neck appears to be chronically leaning forward.  Advised patient take Tylenol as needed for pain control.  Patient was given a prescription for Flexeril p.o. and Lidoderm patch.  Advised patient to follow-up with her PCP within the next week for reevaluation management of her symptoms as PT evaluation may be beneficial for the patient.  Patient return precautions.  patient and family members agreed to this plan.  No further question this time.  Patient stable for discharge.  Diagnosed the patient with right shoulder strain.        DDx/Differential:  Strain, osteoarthritis, cervical radiculopathy,   stroke    Diagnosis:  right shoulder strain        Dictation Disclaimer: Due to voice recognition software, sound alike and misspelled words may be contained in documentation. If you have any questions please contact me.            Procedure  Procedures     Delfin Franklin PA-C  10/12/23 2208       Delfin Franklin PA-C  10/12/23 2201

## 2023-10-18 ENCOUNTER — OFFICE VISIT (OUTPATIENT)
Dept: PRIMARY CARE | Facility: CLINIC | Age: 53
End: 2023-10-18
Payer: COMMERCIAL

## 2023-10-18 VITALS
BODY MASS INDEX: 37.79 KG/M2 | WEIGHT: 200 LBS | SYSTOLIC BLOOD PRESSURE: 118 MMHG | HEART RATE: 72 BPM | DIASTOLIC BLOOD PRESSURE: 68 MMHG | TEMPERATURE: 98 F

## 2023-10-18 DIAGNOSIS — J44.9 CHRONIC OBSTRUCTIVE PULMONARY DISEASE, UNSPECIFIED COPD TYPE (MULTI): ICD-10-CM

## 2023-10-18 DIAGNOSIS — G89.29 CHRONIC RIGHT SHOULDER PAIN: Primary | ICD-10-CM

## 2023-10-18 DIAGNOSIS — M25.511 CHRONIC RIGHT SHOULDER PAIN: Primary | ICD-10-CM

## 2023-10-18 DIAGNOSIS — S46.911A STRAIN OF RIGHT SHOULDER, INITIAL ENCOUNTER: ICD-10-CM

## 2023-10-18 DIAGNOSIS — J20.9 ACUTE BRONCHITIS, UNSPECIFIED ORGANISM: ICD-10-CM

## 2023-10-18 DIAGNOSIS — K29.00 ACUTE GASTRITIS WITHOUT HEMORRHAGE, UNSPECIFIED GASTRITIS TYPE: ICD-10-CM

## 2023-10-18 PROCEDURE — 3074F SYST BP LT 130 MM HG: CPT | Performed by: INTERNAL MEDICINE

## 2023-10-18 PROCEDURE — 3008F BODY MASS INDEX DOCD: CPT | Performed by: INTERNAL MEDICINE

## 2023-10-18 PROCEDURE — 99214 OFFICE O/P EST MOD 30 MIN: CPT | Performed by: INTERNAL MEDICINE

## 2023-10-18 PROCEDURE — 3078F DIAST BP <80 MM HG: CPT | Performed by: INTERNAL MEDICINE

## 2023-10-18 PROCEDURE — 1036F TOBACCO NON-USER: CPT | Performed by: INTERNAL MEDICINE

## 2023-10-18 RX ORDER — ALBUTEROL SULFATE 0.83 MG/ML
2.5 SOLUTION RESPIRATORY (INHALATION) 4 TIMES DAILY PRN
Qty: 30 ML | Refills: 2 | Status: SHIPPED | OUTPATIENT
Start: 2023-10-18 | End: 2023-12-28 | Stop reason: SDUPTHER

## 2023-10-18 RX ORDER — OMEPRAZOLE 20 MG/1
20 CAPSULE, DELAYED RELEASE ORAL DAILY
Qty: 30 CAPSULE | Refills: 5 | Status: SHIPPED | OUTPATIENT
Start: 2023-10-18 | End: 2023-12-18 | Stop reason: ALTCHOICE

## 2023-10-18 RX ORDER — ALBUTEROL SULFATE 0.83 MG/ML
2.5 SOLUTION RESPIRATORY (INHALATION) 4 TIMES DAILY PRN
Qty: 30 ML | Refills: 2 | Status: SHIPPED | OUTPATIENT
Start: 2023-10-18 | End: 2023-10-18 | Stop reason: SDUPTHER

## 2023-10-18 RX ORDER — NEBULIZER AND COMPRESSOR
EACH MISCELLANEOUS
Qty: 1 EACH | Refills: 0 | Status: SHIPPED | OUTPATIENT
Start: 2023-10-18

## 2023-10-18 RX ORDER — AMOXICILLIN AND CLAVULANATE POTASSIUM 875; 125 MG/1; MG/1
875 TABLET, FILM COATED ORAL 2 TIMES DAILY
Qty: 14 TABLET | Refills: 0 | Status: SHIPPED | OUTPATIENT
Start: 2023-10-18 | End: 2023-10-25

## 2023-10-18 RX ORDER — CYCLOBENZAPRINE HCL 10 MG
10 TABLET ORAL 3 TIMES DAILY PRN
Qty: 90 TABLET | Refills: 3 | Status: SHIPPED | OUTPATIENT
Start: 2023-10-18 | End: 2024-02-15

## 2023-10-18 RX ORDER — CELECOXIB 200 MG/1
200 CAPSULE ORAL DAILY
Qty: 30 CAPSULE | Refills: 0 | Status: SHIPPED | OUTPATIENT
Start: 2023-10-18 | End: 2023-11-22 | Stop reason: HOSPADM

## 2023-10-18 ASSESSMENT — ENCOUNTER SYMPTOMS
SEIZURES: 0
ALLERGIC/IMMUNOLOGIC NEGATIVE: 1
SPEECH DIFFICULTY: 1
CONSTITUTIONAL NEGATIVE: 1
DIZZINESS: 0
COUGH: 1
WHEEZING: 1
GASTROINTESTINAL NEGATIVE: 1

## 2023-10-18 NOTE — PROGRESS NOTES
Subjective   Patient ID: Idalia Ramsey is a 53 y.o. female who presents for Follow-up (Pt here for shoulder pain and test results).    HPI patient here for right shoulder pain no history of injury she has right hemiparesis from previous stroke she had x-rays done in ER which were unremarkable she definitely has rotator cuff tendinitis or tear she also had some fever and cough and wheezing and has acute bronchitis    Review of Systems   Constitutional: Negative.    HENT: Negative.     Respiratory:  Positive for cough and wheezing.    Gastrointestinal: Negative.    Musculoskeletal:         Rt Shoulder pain   Allergic/Immunologic: Negative.    Neurological:  Positive for speech difficulty. Negative for dizziness and seizures.        Old Stroke, Rt hemiparesis   All other systems reviewed and are negative.      Objective   /68   Pulse 72   Temp 36.7 °C (98 °F) (Temporal)   Wt 90.7 kg (200 lb)   BMI 37.79 kg/m²     Physical Exam  Vitals reviewed.   HENT:      Head: Normocephalic.   Cardiovascular:      Rate and Rhythm: Normal rate and regular rhythm.   Pulmonary:      Effort: Pulmonary effort is normal.      Breath sounds: Rhonchi present.   Musculoskeletal:      Comments: Painful limitation of range of motion of right shoulder   Neurological:      Mental Status: She is alert.      Comments: Right hemiparesis old         Assessment/Plan   Problem List Items Addressed This Visit    None  Visit Diagnoses         Codes    Chronic right shoulder pain    -  Primary M25.511, G89.29    Relevant Medications    celecoxib (CeleBREX) 200 mg capsule    Other Relevant Orders    Referral to Orthopaedic Surgery    Acute gastritis without hemorrhage, unspecified gastritis type     K29.00    Relevant Medications    omeprazole (PriLOSEC) 20 mg DR capsule    Strain of right shoulder, initial encounter     S46.916N    Relevant Medications    cyclobenzaprine (Flexeril) 10 mg tablet    Acute bronchitis, unspecified organism      J20.9    Relevant Medications    amoxicillin-pot clavulanate (Augmentin) 875-125 mg tablet    albuterol 2.5 mg /3 mL (0.083 %) nebulizer solution    Chronic obstructive pulmonary disease, unspecified COPD type (CMS/[facility])     J44.9    Relevant Medications    nebulizer and compressor device          Patient will be sent for to orthopedics and given Celebrex risk-benefit options discussed since she is on aspirin with history of stroke we will give her PPI to reduce GI toxicity risk/gastritis risk: GI bleeding risk.  She was also given antibiotic and nebulizer for acute bronchitis if her condition gets worse she should be taken to ER.

## 2023-10-28 PROCEDURE — G0180 MD CERTIFICATION HHA PATIENT: HCPCS | Performed by: INTERNAL MEDICINE

## 2023-11-03 ENCOUNTER — HOSPITAL ENCOUNTER (OUTPATIENT)
Dept: RADIOLOGY | Facility: HOSPITAL | Age: 53
Discharge: HOME | End: 2023-11-03
Payer: COMMERCIAL

## 2023-11-03 DIAGNOSIS — R05.9 COUGH, UNSPECIFIED TYPE: ICD-10-CM

## 2023-11-03 PROBLEM — I69.398 OTHER SEQUELAE OF CEREBRAL INFARCTION: Status: ACTIVE | Noted: 2023-01-07

## 2023-11-03 PROBLEM — H52.03 HYPERMETROPIA OF BOTH EYES: Status: ACTIVE | Noted: 2023-11-03

## 2023-11-03 PROBLEM — Z93.1 S/P PERCUTANEOUS ENDOSCOPIC GASTROSTOMY (PEG) TUBE PLACEMENT (MULTI): Status: ACTIVE | Noted: 2021-12-23

## 2023-11-03 PROBLEM — Z86.73 HISTORY OF CARDIOEMBOLIC CEREBROVASCULAR ACCIDENT (CVA): Status: ACTIVE | Noted: 2021-12-23

## 2023-11-03 PROBLEM — R13.10 DYSPHAGIA: Status: ACTIVE | Noted: 2023-11-03

## 2023-11-03 PROBLEM — I63.9 STROKE (MULTI): Status: ACTIVE | Noted: 2023-11-03

## 2023-11-03 PROBLEM — H53.2 DIPLOPIA: Status: ACTIVE | Noted: 2023-11-03

## 2023-11-03 PROBLEM — R27.8 COORDINATION ABNORMAL: Status: ACTIVE | Noted: 2023-11-03

## 2023-11-03 PROBLEM — I69.322 DYSARTHRIA, POST-STROKE: Status: ACTIVE | Noted: 2023-11-03

## 2023-11-03 PROBLEM — H52.203 ASTIGMATISM, BILATERAL: Status: ACTIVE | Noted: 2023-11-03

## 2023-11-03 PROBLEM — I69.322 DYSARTHRIA FOLLOWING CEREBRAL INFARCTION: Status: ACTIVE | Noted: 2023-01-07

## 2023-11-03 PROBLEM — H50.00 ESOTROPIA: Status: ACTIVE | Noted: 2023-11-03

## 2023-11-03 PROBLEM — F32.A DEPRESSION: Status: ACTIVE | Noted: 2023-11-03

## 2023-11-03 PROBLEM — R29.3 ABNORMAL POSTURE: Status: ACTIVE | Noted: 2023-11-03

## 2023-11-03 PROBLEM — H52.4 BILATERAL PRESBYOPIA: Status: ACTIVE | Noted: 2023-11-03

## 2023-11-03 PROBLEM — J44.9 COPD, MILD (MULTI): Status: ACTIVE | Noted: 2023-11-03

## 2023-11-03 PROBLEM — H55.09 ACQUIRED NYSTAGMUS: Status: ACTIVE | Noted: 2023-11-03

## 2023-11-03 PROCEDURE — 71046 X-RAY EXAM CHEST 2 VIEWS: CPT | Mod: FY

## 2023-11-03 PROCEDURE — 71046 X-RAY EXAM CHEST 2 VIEWS: CPT | Performed by: RADIOLOGY

## 2023-11-03 RX ORDER — BUSPIRONE HYDROCHLORIDE 15 MG/1
15 TABLET ORAL 3 TIMES DAILY
COMMUNITY

## 2023-11-03 RX ORDER — CITALOPRAM HYDROBROMIDE 10 MG/5ML
SOLUTION ORAL
Status: ON HOLD | COMMUNITY
Start: 2023-01-07 | End: 2023-11-22 | Stop reason: WASHOUT

## 2023-11-03 RX ORDER — ASPIRIN 81 MG
1-2 TABLET, DELAYED RELEASE (ENTERIC COATED) ORAL 2 TIMES DAILY
Status: ON HOLD | COMMUNITY
Start: 2021-06-28 | End: 2023-11-22 | Stop reason: WASHOUT

## 2023-11-03 RX ORDER — DOCUSATE SODIUM 50 MG/5ML
10 LIQUID ORAL DAILY
COMMUNITY

## 2023-11-03 RX ORDER — ERGOCALCIFEROL (VITAMIN D2) 200 MCG/ML
DROPS ORAL
COMMUNITY
Start: 2023-01-08

## 2023-11-03 RX ORDER — GUAIFENESIN 100 MG/5ML
10 SOLUTION ORAL 2 TIMES DAILY
COMMUNITY
Start: 2021-06-28

## 2023-11-03 RX ORDER — SENNOSIDES 8.6 MG/1
2 TABLET ORAL NIGHTLY
Status: ON HOLD | COMMUNITY
End: 2023-11-22 | Stop reason: WASHOUT

## 2023-11-03 RX ORDER — LORATADINE 10 MG/1
1 TABLET ORAL DAILY
COMMUNITY
Start: 2022-11-14 | End: 2024-05-02 | Stop reason: SDUPTHER

## 2023-11-03 RX ORDER — NYSTATIN 100000 [USP'U]/G
POWDER TOPICAL 2 TIMES DAILY
COMMUNITY
Start: 2022-04-11 | End: 2024-05-16 | Stop reason: SDUPTHER

## 2023-11-03 RX ORDER — CEFUROXIME AXETIL 500 MG/1
500 TABLET ORAL 2 TIMES DAILY
Status: ON HOLD | COMMUNITY
Start: 2021-10-05 | End: 2023-11-22 | Stop reason: ALTCHOICE

## 2023-11-06 ENCOUNTER — OFFICE VISIT (OUTPATIENT)
Dept: ORTHOPEDIC SURGERY | Facility: CLINIC | Age: 53
End: 2023-11-06
Payer: COMMERCIAL

## 2023-11-06 DIAGNOSIS — M25.511 CHRONIC RIGHT SHOULDER PAIN: ICD-10-CM

## 2023-11-06 DIAGNOSIS — G89.29 CHRONIC RIGHT SHOULDER PAIN: ICD-10-CM

## 2023-11-06 DIAGNOSIS — M75.121 NONTRAUMATIC COMPLETE TEAR OF RIGHT ROTATOR CUFF: Primary | ICD-10-CM

## 2023-11-06 DIAGNOSIS — M25.519 SHOULDER PAIN, UNSPECIFIED CHRONICITY, UNSPECIFIED LATERALITY: ICD-10-CM

## 2023-11-06 DIAGNOSIS — J06.9 UPPER RESPIRATORY TRACT INFECTION, UNSPECIFIED TYPE: ICD-10-CM

## 2023-11-06 PROCEDURE — 99213 OFFICE O/P EST LOW 20 MIN: CPT | Performed by: ORTHOPAEDIC SURGERY

## 2023-11-06 PROCEDURE — 1036F TOBACCO NON-USER: CPT | Performed by: ORTHOPAEDIC SURGERY

## 2023-11-06 PROCEDURE — 20610 DRAIN/INJ JOINT/BURSA W/O US: CPT | Performed by: ORTHOPAEDIC SURGERY

## 2023-11-06 PROCEDURE — 2500000004 HC RX 250 GENERAL PHARMACY W/ HCPCS (ALT 636 FOR OP/ED): Performed by: ORTHOPAEDIC SURGERY

## 2023-11-06 PROCEDURE — 3008F BODY MASS INDEX DOCD: CPT | Performed by: ORTHOPAEDIC SURGERY

## 2023-11-06 PROCEDURE — 3074F SYST BP LT 130 MM HG: CPT | Performed by: ORTHOPAEDIC SURGERY

## 2023-11-06 PROCEDURE — 2500000005 HC RX 250 GENERAL PHARMACY W/O HCPCS: Performed by: ORTHOPAEDIC SURGERY

## 2023-11-06 PROCEDURE — 99203 OFFICE O/P NEW LOW 30 MIN: CPT | Performed by: ORTHOPAEDIC SURGERY

## 2023-11-06 PROCEDURE — 3078F DIAST BP <80 MM HG: CPT | Performed by: ORTHOPAEDIC SURGERY

## 2023-11-06 RX ORDER — LIDOCAINE HYDROCHLORIDE 10 MG/ML
5 INJECTION INFILTRATION; PERINEURAL ONCE
Status: COMPLETED | OUTPATIENT
Start: 2023-11-06 | End: 2023-11-06

## 2023-11-06 RX ORDER — AMOXICILLIN AND CLAVULANATE POTASSIUM 875; 125 MG/1; MG/1
875 TABLET, FILM COATED ORAL 2 TIMES DAILY
Qty: 14 TABLET | Refills: 0 | Status: SHIPPED | OUTPATIENT
Start: 2023-11-06 | End: 2023-11-13

## 2023-11-06 RX ORDER — BETAMETHASONE SODIUM PHOSPHATE AND BETAMETHASONE ACETATE 3; 3 MG/ML; MG/ML
12 INJECTION, SUSPENSION INTRA-ARTICULAR; INTRALESIONAL; INTRAMUSCULAR; SOFT TISSUE ONCE
Status: COMPLETED | OUTPATIENT
Start: 2023-11-06 | End: 2023-11-06

## 2023-11-06 RX ADMIN — LIDOCAINE HYDROCHLORIDE 5 ML: 10 INJECTION, SOLUTION INFILTRATION; PERINEURAL at 14:58

## 2023-11-06 RX ADMIN — BETAMETHASONE SODIUM PHOSPHATE AND BETAMETHASONE ACETATE 12 MG: 3; 3 INJECTION, SUSPENSION INTRA-ARTICULAR; INTRALESIONAL; INTRAMUSCULAR at 14:57

## 2023-11-06 NOTE — PROGRESS NOTES
History of present: Right shoulder pain discomfort due to overuse over stretching pulling transferring since her stroke she has limited use of the right upper extremity she is wheelchair-bound as well she is also aphasic.    No prior shoulder surgeries imaging was done at [facility] now with ongoing shoulder pain        Past medical history:    The patient's past medical history, family history, social history and review of systems were documented on the patient's medical intake form.  The medical intake form was reviewed and scanned into the electronic medical record for future use.  History is otherwise negative except as stated in the history of present illness.    Physical exam:    General: Alert and oriented to person place and time.  No acute distress and breathing comfortably, pleasant and cooperative with examination.    Head and neck exam: Head is normocephalic atraumatic.    Neck: Supple no visible swelling or deformity.    Cardiovascular: Good perfusion to affected extremities without signs or symptoms of chest pain.    Lungs no audible wheezing or labored breathing on examination.    Abdominal exam: Nondistended nontender    Extremities: Right shoulder exam limited use she can flex up to 90 abduct to 20 external rotate to 30 internally anteriorly crest    She can actively abduct and no more than about 45 degrees she has profound weakness with push pull she can flex and extend the elbow hand and wrist she is grossly motor intact but has contractures of the intrinsic in her hand as well    She uses a Kiley lift    The left side function significantly better    Before aspiration injection the benefits of a cortisone injection including infection, local skin irritation, skin atrophy, calcification, continued pain and discomfort, elevated blood sugar, burning, failure to relieve pain, possible late infection were discussed with the patient.    Postprocedure discomfort can be alleviated with  additional medications, ice, elevation, rest over the first 24 hours as recommended.    Patient verbalized understanding and wanted to proceed with the planned procedure.    After informed consent was provided and allergies verified, the patient was positioned appropriately on thel bed.  The joint to be aspirated and injected was prepped and draped in a sterile fashion.  The skin was anesthetized with ethyl chloride spray.  A joint aspiration was to be performed an 18-gauge needle was used otherwise a 22-gauge needle was used to inject the appropriate joint.    Joint injection was performed with a mixture of right shoulder injected with 5 cc 1% lidocaine plain and 2 cc Celestone Soluspan 6 mg per mL.  The needle was removed and the puncture site closed and sealed with a Band-Aid.  The patient tolerated the procedure well.    If fluid was aspirated it was sent for further studies and a sterile specimen container as ordered to the lab.        Diagnostic studies: X-rays done at [facility] show relatively well-preserved glenohumeral joint some subacromial spur formation slightly narrowed subacromial space      Impression: Overuse rotator cuff dysfunction in a patient with a history of stroke and limited use of the right upper extremity      Plan: Injection moist heat local modalities    Using a Kiley lift is a good idea avoiding transfer and pulling and transferring the patient by pulling on the arm leads to more instability more pain issues    She is not a surgical candidate due to her wheelchair dependency and CVA we will see her back in 4 to 5 weeks if she does not get significant release of pain with a soft bursal shot we will then do a fluoroscopic guided intra-articular injection at the hospital

## 2023-11-16 DIAGNOSIS — N39.0 URINARY TRACT INFECTION, SITE NOT SPECIFIED: Primary | ICD-10-CM

## 2023-11-21 ENCOUNTER — HOSPITAL ENCOUNTER (INPATIENT)
Facility: HOSPITAL | Age: 53
LOS: 1 days | Discharge: HOME | DRG: 191 | End: 2023-11-22
Attending: STUDENT IN AN ORGANIZED HEALTH CARE EDUCATION/TRAINING PROGRAM | Admitting: HOSPITALIST
Payer: COMMERCIAL

## 2023-11-21 ENCOUNTER — APPOINTMENT (OUTPATIENT)
Dept: CARDIOLOGY | Facility: HOSPITAL | Age: 53
DRG: 191 | End: 2023-11-21
Payer: COMMERCIAL

## 2023-11-21 ENCOUNTER — APPOINTMENT (OUTPATIENT)
Dept: RADIOLOGY | Facility: HOSPITAL | Age: 53
DRG: 191 | End: 2023-11-21
Payer: COMMERCIAL

## 2023-11-21 DIAGNOSIS — J44.9 COPD (CHRONIC OBSTRUCTIVE PULMONARY DISEASE) (MULTI): Primary | ICD-10-CM

## 2023-11-21 DIAGNOSIS — M25.511 CHRONIC RIGHT SHOULDER PAIN: ICD-10-CM

## 2023-11-21 DIAGNOSIS — R06.02 SHORTNESS OF BREATH: ICD-10-CM

## 2023-11-21 DIAGNOSIS — R09.89 OTHER SPECIFIED SYMPTOMS AND SIGNS INVOLVING THE CIRCULATORY AND RESPIRATORY SYSTEMS: ICD-10-CM

## 2023-11-21 DIAGNOSIS — J44.9 COPD, MILD (MULTI): ICD-10-CM

## 2023-11-21 DIAGNOSIS — G89.29 CHRONIC RIGHT SHOULDER PAIN: ICD-10-CM

## 2023-11-21 DIAGNOSIS — I31.39 PERICARDIAL EFFUSION (HHS-HCC): ICD-10-CM

## 2023-11-21 LAB
ALBUMIN SERPL BCP-MCNC: 4.5 G/DL (ref 3.4–5)
ALP SERPL-CCNC: 123 U/L (ref 33–110)
ALT SERPL W P-5'-P-CCNC: 17 U/L (ref 7–45)
ANION GAP SERPL CALC-SCNC: 13 MMOL/L (ref 10–20)
AST SERPL W P-5'-P-CCNC: 15 U/L (ref 9–39)
BASOPHILS # BLD AUTO: 0.08 X10*3/UL (ref 0–0.1)
BASOPHILS NFR BLD AUTO: 0.7 %
BILIRUB SERPL-MCNC: 0.7 MG/DL (ref 0–1.2)
BNP SERPL-MCNC: 24 PG/ML (ref 0–99)
BUN SERPL-MCNC: 15 MG/DL (ref 6–23)
CALCIUM SERPL-MCNC: 9.6 MG/DL (ref 8.6–10.3)
CARDIAC TROPONIN I PNL SERPL HS: <3 NG/L (ref 0–13)
CARDIAC TROPONIN I PNL SERPL HS: <3 NG/L (ref 0–13)
CHLORIDE SERPL-SCNC: 100 MMOL/L (ref 98–107)
CO2 SERPL-SCNC: 29 MMOL/L (ref 21–32)
CREAT SERPL-MCNC: 0.63 MG/DL (ref 0.5–1.05)
D DIMER PPP FEU-MCNC: 588 NG/ML FEU
EOSINOPHIL # BLD AUTO: 0.86 X10*3/UL (ref 0–0.7)
EOSINOPHIL NFR BLD AUTO: 7.8 %
ERYTHROCYTE [DISTWIDTH] IN BLOOD BY AUTOMATED COUNT: 14.9 % (ref 11.5–14.5)
FLUAV RNA RESP QL NAA+PROBE: NOT DETECTED
FLUBV RNA RESP QL NAA+PROBE: NOT DETECTED
GFR SERPL CREATININE-BSD FRML MDRD: >90 ML/MIN/1.73M*2
GLUCOSE SERPL-MCNC: 91 MG/DL (ref 74–99)
HCT VFR BLD AUTO: 42.3 % (ref 36–46)
HGB BLD-MCNC: 13.7 G/DL (ref 12–16)
IMM GRANULOCYTES # BLD AUTO: 0.03 X10*3/UL (ref 0–0.7)
IMM GRANULOCYTES NFR BLD AUTO: 0.3 % (ref 0–0.9)
LACTATE SERPL-SCNC: 1.3 MMOL/L (ref 0.4–2)
LYMPHOCYTES # BLD AUTO: 1.87 X10*3/UL (ref 1.2–4.8)
LYMPHOCYTES NFR BLD AUTO: 17 %
MAGNESIUM SERPL-MCNC: 1.93 MG/DL (ref 1.6–2.4)
MCH RBC QN AUTO: 28.3 PG (ref 26–34)
MCHC RBC AUTO-ENTMCNC: 32.4 G/DL (ref 32–36)
MCV RBC AUTO: 87 FL (ref 80–100)
MONOCYTES # BLD AUTO: 0.47 X10*3/UL (ref 0.1–1)
MONOCYTES NFR BLD AUTO: 4.3 %
NEUTROPHILS # BLD AUTO: 7.72 X10*3/UL (ref 1.2–7.7)
NEUTROPHILS NFR BLD AUTO: 69.9 %
NRBC BLD-RTO: 0 /100 WBCS (ref 0–0)
PLATELET # BLD AUTO: 308 X10*3/UL (ref 150–450)
POTASSIUM SERPL-SCNC: 3.7 MMOL/L (ref 3.5–5.3)
PROT SERPL-MCNC: 8.2 G/DL (ref 6.4–8.2)
RBC # BLD AUTO: 4.84 X10*6/UL (ref 4–5.2)
SARS-COV-2 RNA RESP QL NAA+PROBE: NOT DETECTED
SODIUM SERPL-SCNC: 138 MMOL/L (ref 136–145)
WBC # BLD AUTO: 11 X10*3/UL (ref 4.4–11.3)

## 2023-11-21 PROCEDURE — 84484 ASSAY OF TROPONIN QUANT: CPT | Performed by: STUDENT IN AN ORGANIZED HEALTH CARE EDUCATION/TRAINING PROGRAM

## 2023-11-21 PROCEDURE — 85379 FIBRIN DEGRADATION QUANT: CPT | Performed by: STUDENT IN AN ORGANIZED HEALTH CARE EDUCATION/TRAINING PROGRAM

## 2023-11-21 PROCEDURE — 87075 CULTR BACTERIA EXCEPT BLOOD: CPT | Mod: ELYLAB | Performed by: STUDENT IN AN ORGANIZED HEALTH CARE EDUCATION/TRAINING PROGRAM

## 2023-11-21 PROCEDURE — 36415 COLL VENOUS BLD VENIPUNCTURE: CPT | Performed by: STUDENT IN AN ORGANIZED HEALTH CARE EDUCATION/TRAINING PROGRAM

## 2023-11-21 PROCEDURE — 83735 ASSAY OF MAGNESIUM: CPT | Performed by: STUDENT IN AN ORGANIZED HEALTH CARE EDUCATION/TRAINING PROGRAM

## 2023-11-21 PROCEDURE — 94640 AIRWAY INHALATION TREATMENT: CPT

## 2023-11-21 PROCEDURE — 96374 THER/PROPH/DIAG INJ IV PUSH: CPT

## 2023-11-21 PROCEDURE — 94760 N-INVAS EAR/PLS OXIMETRY 1: CPT

## 2023-11-21 PROCEDURE — 99285 EMERGENCY DEPT VISIT HI MDM: CPT | Mod: 25 | Performed by: STUDENT IN AN ORGANIZED HEALTH CARE EDUCATION/TRAINING PROGRAM

## 2023-11-21 PROCEDURE — 93005 ELECTROCARDIOGRAM TRACING: CPT

## 2023-11-21 PROCEDURE — 87040 BLOOD CULTURE FOR BACTERIA: CPT | Mod: ELYLAB | Performed by: STUDENT IN AN ORGANIZED HEALTH CARE EDUCATION/TRAINING PROGRAM

## 2023-11-21 PROCEDURE — 85025 COMPLETE CBC W/AUTO DIFF WBC: CPT | Performed by: STUDENT IN AN ORGANIZED HEALTH CARE EDUCATION/TRAINING PROGRAM

## 2023-11-21 PROCEDURE — 83880 ASSAY OF NATRIURETIC PEPTIDE: CPT | Performed by: HOSPITALIST

## 2023-11-21 PROCEDURE — 83880 ASSAY OF NATRIURETIC PEPTIDE: CPT | Performed by: STUDENT IN AN ORGANIZED HEALTH CARE EDUCATION/TRAINING PROGRAM

## 2023-11-21 PROCEDURE — 71045 X-RAY EXAM CHEST 1 VIEW: CPT | Performed by: RADIOLOGY

## 2023-11-21 PROCEDURE — G0378 HOSPITAL OBSERVATION PER HR: HCPCS

## 2023-11-21 PROCEDURE — 2500000004 HC RX 250 GENERAL PHARMACY W/ HCPCS (ALT 636 FOR OP/ED): Performed by: STUDENT IN AN ORGANIZED HEALTH CARE EDUCATION/TRAINING PROGRAM

## 2023-11-21 PROCEDURE — 83605 ASSAY OF LACTIC ACID: CPT | Performed by: STUDENT IN AN ORGANIZED HEALTH CARE EDUCATION/TRAINING PROGRAM

## 2023-11-21 PROCEDURE — 99223 1ST HOSP IP/OBS HIGH 75: CPT | Performed by: NURSE PRACTITIONER

## 2023-11-21 PROCEDURE — 2500000002 HC RX 250 W HCPCS SELF ADMINISTERED DRUGS (ALT 637 FOR MEDICARE OP, ALT 636 FOR OP/ED): Performed by: STUDENT IN AN ORGANIZED HEALTH CARE EDUCATION/TRAINING PROGRAM

## 2023-11-21 PROCEDURE — 71045 X-RAY EXAM CHEST 1 VIEW: CPT | Mod: FY

## 2023-11-21 PROCEDURE — 71275 CT ANGIOGRAPHY CHEST: CPT | Performed by: STUDENT IN AN ORGANIZED HEALTH CARE EDUCATION/TRAINING PROGRAM

## 2023-11-21 PROCEDURE — 71275 CT ANGIOGRAPHY CHEST: CPT

## 2023-11-21 PROCEDURE — 2550000001 HC RX 255 CONTRASTS: Performed by: STUDENT IN AN ORGANIZED HEALTH CARE EDUCATION/TRAINING PROGRAM

## 2023-11-21 PROCEDURE — 80053 COMPREHEN METABOLIC PANEL: CPT | Performed by: STUDENT IN AN ORGANIZED HEALTH CARE EDUCATION/TRAINING PROGRAM

## 2023-11-21 PROCEDURE — 87636 SARSCOV2 & INF A&B AMP PRB: CPT | Performed by: STUDENT IN AN ORGANIZED HEALTH CARE EDUCATION/TRAINING PROGRAM

## 2023-11-21 RX ORDER — IPRATROPIUM BROMIDE AND ALBUTEROL SULFATE 2.5; .5 MG/3ML; MG/3ML
3 SOLUTION RESPIRATORY (INHALATION) ONCE
Status: COMPLETED | OUTPATIENT
Start: 2023-11-21 | End: 2023-11-21

## 2023-11-21 RX ORDER — IPRATROPIUM BROMIDE AND ALBUTEROL SULFATE 2.5; .5 MG/3ML; MG/3ML
3 SOLUTION RESPIRATORY (INHALATION)
Status: DISCONTINUED | OUTPATIENT
Start: 2023-11-21 | End: 2023-11-21

## 2023-11-21 RX ADMIN — IPRATROPIUM BROMIDE AND ALBUTEROL SULFATE 3 ML: 2.5; .5 SOLUTION RESPIRATORY (INHALATION) at 17:04

## 2023-11-21 RX ADMIN — METHYLPREDNISOLONE SODIUM SUCCINATE 125 MG: 125 INJECTION, POWDER, FOR SOLUTION INTRAMUSCULAR; INTRAVENOUS at 16:52

## 2023-11-21 RX ADMIN — IOHEXOL 75 ML: 350 INJECTION, SOLUTION INTRAVENOUS at 17:43

## 2023-11-21 ASSESSMENT — LIFESTYLE VARIABLES
HAVE YOU EVER FELT YOU SHOULD CUT DOWN ON YOUR DRINKING: NO
EVER FELT BAD OR GUILTY ABOUT YOUR DRINKING: NO
EVER HAD A DRINK FIRST THING IN THE MORNING TO STEADY YOUR NERVES TO GET RID OF A HANGOVER: NO
HAVE PEOPLE ANNOYED YOU BY CRITICIZING YOUR DRINKING: NO
REASON UNABLE TO ASSESS: NO

## 2023-11-21 ASSESSMENT — PAIN SCALES - GENERAL
PAINLEVEL_OUTOF10: 0 - NO PAIN

## 2023-11-21 ASSESSMENT — COLUMBIA-SUICIDE SEVERITY RATING SCALE - C-SSRS
6. HAVE YOU EVER DONE ANYTHING, STARTED TO DO ANYTHING, OR PREPARED TO DO ANYTHING TO END YOUR LIFE?: NO
2. HAVE YOU ACTUALLY HAD ANY THOUGHTS OF KILLING YOURSELF?: NO
1. IN THE PAST MONTH, HAVE YOU WISHED YOU WERE DEAD OR WISHED YOU COULD GO TO SLEEP AND NOT WAKE UP?: NO

## 2023-11-21 ASSESSMENT — PAIN - FUNCTIONAL ASSESSMENT: PAIN_FUNCTIONAL_ASSESSMENT: 0-10

## 2023-11-21 NOTE — ED PROVIDER NOTES
HPI   Chief Complaint   Patient presents with    Shortness of Breath     DX W/ PNEUMONIA X3WK AGO, WAS ON ORAL ANTIBIOTICS AT HOME, SOB WORSENING.        Patient is a 53-year-old female presenting to the emergency department direction of her primary care doctor for shortness of breath.  Patient had a chest x-ray 3 weeks ago and was placed on antibiotics for pneumonia.  Patient has finished her antibiotics but is still having worsening difficulty breathing so she came to the emergency department today for evaluation.  Patient endorses a wet cough but states she is unable to cough anything up.  No reported fevers or chills.  Patient does endorse difficulty breathing when lying down.  Patient does not wear oxygen at home.  Patient was reportedly found satting 88% on room air with EMS.  Patient denies any chest pain, dizziness, lightheadedness, syncopal episodes, falls or traumatic injuries.          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     Past Surgical History:   Procedure Laterality Date    OTHER SURGICAL HISTORY  2021     section    OTHER SURGICAL HISTORY  2021    Percutaneous endoscopic gastrostomy tube insertion    OTHER SURGICAL HISTORY  2021    Tonsillectomy     Family History   Problem Relation Name Age of Onset    Breast cancer Mother      Lymphoma Father      Uterine cancer Sister       Social History     Tobacco Use    Smoking status: Never    Smokeless tobacco: Never   Substance Use Topics    Alcohol use: Never    Drug use: Never       Physical Exam   ED Triage Vitals   Temp Heart Rate Resp BP   23 1612 23 1612 23 1612 23 1612   36.9 °C (98.4  °F) (!) 111 20 136/75      SpO2 Temp Source Heart Rate Source Patient Position   11/21/23 1612 11/21/23 1612 11/21/23 1612 11/21/23 1612   94 % Temporal Monitor Sitting      BP Location FiO2 (%)     11/21/23 1612 11/21/23 1704     Right arm 21 %       Physical Exam  Vitals and nursing note reviewed.   Constitutional:       General: She is not in acute distress.     Appearance: Normal appearance. She is not ill-appearing, toxic-appearing or diaphoretic.   HENT:      Head: Normocephalic and atraumatic.      Nose: Nose normal.      Mouth/Throat:      Mouth: Mucous membranes are moist.      Pharynx: No oropharyngeal exudate or posterior oropharyngeal erythema.   Eyes:      General: No scleral icterus.     Extraocular Movements: Extraocular movements intact.      Pupils: Pupils are equal, round, and reactive to light.   Cardiovascular:      Rate and Rhythm: Normal rate and regular rhythm.      Pulses: Normal pulses.      Heart sounds: Normal heart sounds. No murmur heard.     No friction rub. No gallop.   Pulmonary:      Effort: Pulmonary effort is normal. No respiratory distress.      Breath sounds: No stridor. Examination of the right-upper field reveals wheezing. Examination of the left-upper field reveals wheezing. Decreased breath sounds and wheezing present. No rhonchi or rales.   Chest:      Chest wall: No tenderness.   Abdominal:      General: Abdomen is flat. There is no distension.      Palpations: Abdomen is soft. There is no mass.      Tenderness: There is no abdominal tenderness. There is no guarding.      Hernia: No hernia is present.   Musculoskeletal:         General: No swelling, tenderness, deformity or signs of injury. Normal range of motion.      Cervical back: Normal range of motion and neck supple. No rigidity.   Skin:     General: Skin is warm and dry.      Capillary Refill: Capillary refill takes less than 2 seconds.      Coloration: Skin is not jaundiced or pale.      Findings: No bruising,  erythema, lesion or rash.   Neurological:      General: No focal deficit present.      Mental Status: She is alert and oriented to person, place, and time. Mental status is at baseline.      Comments: Baseline aphasia from previous stroke noted.   Psychiatric:         Mood and Affect: Mood normal.         Behavior: Behavior normal.         ED Course & MDM   Diagnoses as of 11/21/23 2203   COPD (chronic obstructive pulmonary disease) (CMS/[facility])   Pericardial effusion       Medical Decision Making  Patient is a 53-year-old female presenting to the emergency department complaints of worsening shortness of breath.  Patient was treated with Augmentin for pneumonia by her PCP but states she has not gotten any better.  Patient presents satting 88% on room air via EMS.  Patient does have a history of COPD but is not on any oxygen.  She is not in any significant respiratory distress however she does have diminished lung sounds and wheezing in the upper lobes.  Patient be given Solu-Medrol and DuoNeb and treated for COPD exacerbation and repeat chest x-ray will be obtained along with labs to investigate alternative causes.    Patient was not hypoxic during my evaluation in the emergency department.  Patient did have some improvement in her breathing and her lung sounds after steroids and DuoNebs.  CBC reviewed without concerning findings.  Metabolic panel reviewed without concerning findings.  Magnesium levels normal.  Troponin was negative x 2.  EKG was nonischemic.  Lactate level is 1.3.  BNP is not elevated.  COVID and influenza were negative.  Chest x-ray was negative for acute cardiopulmonary processes.  D-dimer was elevated and this led to a CT PE study being performed.  There was no evidence of acute pulmonary embolisms or infectious etiology however the patient does have pre-existing pericardial effusion which has increased from 1 cm to 1.3 cm since 2021.  Patient states that she was unaware that she had a pericardial  effusion is uncertain of the etiology of this.  Also has a slight increase I believe the patient's difficulty breathing was most likely related to her COPD exacerbation with a increasing pericardial effusion this could possibly be a additional exacerbating factor.  Patient was advised overall laboratory and imaging findings and recommendation for admission and the patient is in agreement with this.  Hospital service was contacted and the case discussed.  Patient was accepted for admission.    Amount and/or Complexity of Data Reviewed  Labs: ordered. Decision-making details documented in ED Course.  Radiology: ordered. Decision-making details documented in ED Course.  ECG/medicine tests: independent interpretation performed.     Details: Sinus rhythm with a ventricular rate of 99 bpm.  QRS interval 60 ms.  QTc 400 ms.  Normal axis.  No acute ischemic injury pattern appreciated.      Labs Reviewed   CBC WITH AUTO DIFFERENTIAL - Abnormal       Result Value    WBC 11.0      nRBC 0.0      RBC 4.84      Hemoglobin 13.7      Hematocrit 42.3      MCV 87      MCH 28.3      MCHC 32.4      RDW 14.9 (*)     Platelets 308      Neutrophils % 69.9      Immature Granulocytes %, Automated 0.3      Lymphocytes % 17.0      Monocytes % 4.3      Eosinophils % 7.8      Basophils % 0.7      Neutrophils Absolute 7.72 (*)     Immature Granulocytes Absolute, Automated 0.03      Lymphocytes Absolute 1.87      Monocytes Absolute 0.47      Eosinophils Absolute 0.86 (*)     Basophils Absolute 0.08     COMPREHENSIVE METABOLIC PANEL - Abnormal    Glucose 91      Sodium 138      Potassium 3.7      Chloride 100      Bicarbonate 29      Anion Gap 13      Urea Nitrogen 15      Creatinine 0.63      eGFR >90      Calcium 9.6      Albumin 4.5      Alkaline Phosphatase 123 (*)     Total Protein 8.2      AST 15      Bilirubin, Total 0.7      ALT 17     D-DIMER, VTE EXCLUSION - Abnormal    D-Dimer, Quantitative VTE Exclusion 588 (*)     Narrative:     The  VTE Exclusion D-Dimer assay is reported in ng/mL Fibrinogen Equivalent Units (FEU).    Per 's instructions for use, a value of less than 500 ng/mL (FEU) may help to exclude DVT or PE in outpatients when the assay is used with a clinical pretest probability assessment.(AEMR must utilize and document eCalc 'Wells Score Deep Vein Thrombosis Risk' for DVT exclusion only. Emergency Department should utilize  Guidelines for Emergency Department Use of the VTE Exclusion D-Dimer and Clinical Pretest probability assessment model for DVT or PE exclusion.)   MAGNESIUM - Normal    Magnesium 1.93     B-TYPE NATRIURETIC PEPTIDE - Normal    BNP 24      Narrative:        <100 pg/mL - Heart failure unlikely  100-299 pg/mL - Intermediate probability of acute heart                  failure exacerbation. Correlate with clinical                  context and patient history.    >=300 pg/mL - Heart Failure likely. Correlate with clinical                  context and patient history.    BNP testing is performed using different testing methodology at [facility] than at other [facility]. Direct result comparisons should only be made within the same method.      LACTATE - Normal    Lactate 1.3      Narrative:     Venipuncture immediately after or during the administration of Metamizole may lead to falsely low results. Testing should be performed immediately  prior to Metamizole dosing.   SARS-COV-2 AND INFLUENZA A/B PCR - Normal    Flu A Result Not Detected      Flu B Result Not Detected      Coronavirus 2019, PCR Not Detected      Narrative:     This assay has received FDA Emergency Use Authorization (EUA) and  is only authorized for the duration of time that circumstances exist to justify the authorization of the emergency use of in vitro diagnostic tests for the detection of SARS-CoV-2 virus and/or diagnosis of COVID-19 infection under section 564(b)(1) of the Act, 21 U.S.C. 360bbb-3(b)(1). Testing for  SARS-CoV-2 is only recommended for patients who meet current clinical and/or epidemiological criteria as defined by federal, state, or local public health directives. This assay is an in vitro diagnostic nucleic acid amplification test for the qualitative detection of SARS-CoV-2, Influenza A, and Influenza B from nasopharyngeal specimens and has been validated for use at [facility]. Negative results do not preclude COVID-19 infections or Influenza A/B infections, and should not be used as the sole basis for diagnosis, treatment, or other management decisions. If Influenza A/B and RSV PCR results are negative, testing for Parainfluenza virus, Adenovirus and Metapneumovirus is routinely performed for [facility] pediatric oncology and intensive care inpatients, and is available on other patients by placing an add-on request.    SERIAL TROPONIN-INITIAL - Normal    Troponin I, High Sensitivity <3      Narrative:     Less than 99th percentile of normal range cutoff-  Female and children under 18 years old <14 ng/L; Male <21 ng/L: Negative  Repeat testing should be performed if clinically indicated.     Female and children under 18 years old 14-50 ng/L; Male 21-50 ng/L:  Consistent with possible cardiac damage and possible increased clinical   risk. Serial measurements may help to assess extent of myocardial damage.     >50 ng/L: Consistent with cardiac damage, increased clinical risk and  myocardial infarction. Serial measurements may help assess extent of   myocardial damage.      NOTE: Children less than 1 year old may have higher baseline troponin   levels and results should be interpreted in conjunction with the overall   clinical context.     NOTE: Troponin I testing is performed using a different   testing methodology at [facility] than at other   [facility]. Direct result comparisons should only   be made within the same method.   BLOOD CULTURE   BLOOD CULTURE   TROPONIN SERIES-  (INITIAL, 1 HR)    Narrative:     The following orders were created for panel order Troponin I Series, High Sensitivity (0, 1 HR).  Procedure                               Abnormality         Status                     ---------                               -----------         ------                     Troponin I, High Sensiti...[[id number]]  Normal              Final result               Troponin, High Sensitivi...[[id number]]                                                   Please view results for these tests on the individual orders.   SERIAL TROPONIN, 1 HOUR     XR chest 1 view   Final Result   Limited inspiration. No acute pulmonary disease.        MACRO:   none        Signed by: Bridgett Bonilla 11/21/2023 4:38 PM   Dictation workstation:   [id number]      CT angio chest for pulmonary embolism    (Results Pending)         Procedure  Procedures     Mann Rojas DO  11/21/23 220

## 2023-11-21 NOTE — ED TRIAGE NOTES
PT. ARRIVED VIA EMS TO ED FROM HOME FOR SOB. PT. HAS CVA IN 2019, NONVERBAL, A&O X4, IS ABLE TO NOD YES/NO, DAUGHTER ABLE TO ANSWER QUESTIONS. PT. ON NECTAR THICK DIET/GTUB. PT. DAUGHTER STATES, SOB X3WKS, HAS BEEN DX W/ PNEUMONIA, HAS BEEN TAKING BREATHING TREATMENTS, ORAL ANTIBIOTICS AT HOME, SYMPTOMS NOT SUBSIDING. PT. BREATHING UNLABORED, B/L LUNG SOUNDS DIMINISHED (LT WORSE THAN RT), PULSE OX READING 94% ON RA, NON PRODUCTIVE COUGH, DID RECEIVE X1 DUO NEB TREATMENT IN ROUTE. PT. ON CARDIAC MONITOR W/ HR .

## 2023-11-22 ENCOUNTER — APPOINTMENT (OUTPATIENT)
Dept: CARDIOLOGY | Facility: HOSPITAL | Age: 53
DRG: 191 | End: 2023-11-22
Payer: COMMERCIAL

## 2023-11-22 VITALS
OXYGEN SATURATION: 93 % | HEART RATE: 100 BPM | DIASTOLIC BLOOD PRESSURE: 77 MMHG | RESPIRATION RATE: 15 BRPM | TEMPERATURE: 97.9 F | WEIGHT: 211.64 LBS | BODY MASS INDEX: 38.95 KG/M2 | HEIGHT: 62 IN | SYSTOLIC BLOOD PRESSURE: 113 MMHG

## 2023-11-22 LAB
BNP SERPL-MCNC: 20 PG/ML (ref 0–99)
EJECTION FRACTION APICAL 4 CHAMBER: 67.1
EJECTION FRACTION: 68
LEFT ATRIUM VOLUME AREA LENGTH INDEX BSA: 19.7
LEFT VENTRICLE INTERNAL DIMENSION DIASTOLE: 4.94 (ref 3.5–6)
LEFT VENTRICULAR OUTFLOW TRACT DIAMETER: 2

## 2023-11-22 PROCEDURE — 93306 TTE W/DOPPLER COMPLETE: CPT | Performed by: INTERNAL MEDICINE

## 2023-11-22 PROCEDURE — 99239 HOSP IP/OBS DSCHRG MGMT >30: CPT | Performed by: HOSPITALIST

## 2023-11-22 PROCEDURE — 2500000004 HC RX 250 GENERAL PHARMACY W/ HCPCS (ALT 636 FOR OP/ED): Performed by: NURSE PRACTITIONER

## 2023-11-22 PROCEDURE — 1200000002 HC GENERAL ROOM WITH TELEMETRY DAILY

## 2023-11-22 PROCEDURE — 2500000001 HC RX 250 WO HCPCS SELF ADMINISTERED DRUGS (ALT 637 FOR MEDICARE OP): Performed by: NURSE PRACTITIONER

## 2023-11-22 PROCEDURE — 99223 1ST HOSP IP/OBS HIGH 75: CPT | Performed by: INTERNAL MEDICINE

## 2023-11-22 PROCEDURE — 94640 AIRWAY INHALATION TREATMENT: CPT

## 2023-11-22 PROCEDURE — 92610 EVALUATE SWALLOWING FUNCTION: CPT | Mod: GN

## 2023-11-22 PROCEDURE — 93306 TTE W/DOPPLER COMPLETE: CPT

## 2023-11-22 PROCEDURE — 2500000002 HC RX 250 W HCPCS SELF ADMINISTERED DRUGS (ALT 637 FOR MEDICARE OP, ALT 636 FOR OP/ED): Performed by: NURSE PRACTITIONER

## 2023-11-22 RX ORDER — NYSTATIN 100000 [USP'U]/G
POWDER TOPICAL 2 TIMES DAILY
Status: DISCONTINUED | OUTPATIENT
Start: 2023-11-22 | End: 2023-11-22 | Stop reason: HOSPADM

## 2023-11-22 RX ORDER — TRAZODONE HYDROCHLORIDE 50 MG/1
100 TABLET ORAL NIGHTLY
Status: DISCONTINUED | OUTPATIENT
Start: 2023-11-22 | End: 2023-11-22 | Stop reason: HOSPADM

## 2023-11-22 RX ORDER — BUSPIRONE HYDROCHLORIDE 5 MG/1
15 TABLET ORAL 3 TIMES DAILY
Status: DISCONTINUED | OUTPATIENT
Start: 2023-11-22 | End: 2023-11-22 | Stop reason: HOSPADM

## 2023-11-22 RX ORDER — GABAPENTIN 250 MG/5ML
250 SOLUTION ORAL EVERY 8 HOURS SCHEDULED
Status: DISCONTINUED | OUTPATIENT
Start: 2023-11-22 | End: 2023-11-22 | Stop reason: HOSPADM

## 2023-11-22 RX ORDER — AMLODIPINE BESYLATE 5 MG/1
10 TABLET ORAL DAILY
Status: DISCONTINUED | OUTPATIENT
Start: 2023-11-22 | End: 2023-11-22 | Stop reason: HOSPADM

## 2023-11-22 RX ORDER — SENNOSIDES 8.8 MG/5ML
5 LIQUID ORAL NIGHTLY
Status: DISCONTINUED | OUTPATIENT
Start: 2023-11-22 | End: 2023-11-22 | Stop reason: HOSPADM

## 2023-11-22 RX ORDER — PREDNISONE 20 MG/1
20 TABLET ORAL 2 TIMES DAILY
Status: DISCONTINUED | OUTPATIENT
Start: 2023-11-22 | End: 2023-11-22 | Stop reason: HOSPADM

## 2023-11-22 RX ORDER — PREDNISONE 20 MG/1
TABLET ORAL
Qty: 11 TABLET | Refills: 0 | Status: SHIPPED | OUTPATIENT
Start: 2023-11-22 | End: 2023-11-23 | Stop reason: SDUPTHER

## 2023-11-22 RX ORDER — ASPIRIN 81 MG/1
81 TABLET ORAL EVERY OTHER DAY
Status: DISCONTINUED | OUTPATIENT
Start: 2023-11-23 | End: 2023-11-22 | Stop reason: HOSPADM

## 2023-11-22 RX ORDER — GUAIFENESIN 100 MG/5ML
10 SOLUTION ORAL 2 TIMES DAILY
Status: DISCONTINUED | OUTPATIENT
Start: 2023-11-22 | End: 2023-11-22 | Stop reason: HOSPADM

## 2023-11-22 RX ORDER — ESOMEPRAZOLE MAGNESIUM 20 MG/1
20 GRANULE, DELAYED RELEASE ORAL
Status: DISCONTINUED | OUTPATIENT
Start: 2023-11-23 | End: 2023-11-22 | Stop reason: HOSPADM

## 2023-11-22 RX ORDER — BACLOFEN 10 MG/1
10 TABLET ORAL 3 TIMES DAILY PRN
Status: DISCONTINUED | OUTPATIENT
Start: 2023-11-22 | End: 2023-11-22 | Stop reason: HOSPADM

## 2023-11-22 RX ORDER — DOCUSATE SODIUM 50 MG/5ML
100 LIQUID ORAL DAILY
Status: DISCONTINUED | OUTPATIENT
Start: 2023-11-22 | End: 2023-11-22 | Stop reason: HOSPADM

## 2023-11-22 RX ORDER — ATORVASTATIN CALCIUM 80 MG/1
80 TABLET, FILM COATED ORAL NIGHTLY
Status: DISCONTINUED | OUTPATIENT
Start: 2023-11-22 | End: 2023-11-22 | Stop reason: HOSPADM

## 2023-11-22 RX ORDER — IPRATROPIUM BROMIDE AND ALBUTEROL SULFATE 2.5; .5 MG/3ML; MG/3ML
3 SOLUTION RESPIRATORY (INHALATION) EVERY 4 HOURS PRN
Status: DISCONTINUED | OUTPATIENT
Start: 2023-11-22 | End: 2023-11-22 | Stop reason: HOSPADM

## 2023-11-22 RX ORDER — FOLIC ACID 1 MG/1
1 TABLET ORAL DAILY
Status: DISCONTINUED | OUTPATIENT
Start: 2023-11-22 | End: 2023-11-22 | Stop reason: HOSPADM

## 2023-11-22 RX ORDER — IPRATROPIUM BROMIDE AND ALBUTEROL SULFATE 2.5; .5 MG/3ML; MG/3ML
3 SOLUTION RESPIRATORY (INHALATION) EVERY 4 HOURS PRN
Qty: 180 ML | Refills: 0 | Status: SHIPPED | OUTPATIENT
Start: 2023-11-22 | End: 2023-11-23 | Stop reason: SDUPTHER

## 2023-11-22 RX ADMIN — BACLOFEN 10 MG: 10 TABLET ORAL at 12:51

## 2023-11-22 RX ADMIN — DOCUSATE SODIUM LIQUID 100 MG: 100 LIQUID ORAL at 12:45

## 2023-11-22 RX ADMIN — AMLODIPINE BESYLATE 10 MG: 5 TABLET ORAL at 12:45

## 2023-11-22 RX ADMIN — FOLIC ACID 1 MG: 1 TABLET ORAL at 12:45

## 2023-11-22 RX ADMIN — IPRATROPIUM BROMIDE AND ALBUTEROL SULFATE 3 ML: 2.5; .5 SOLUTION RESPIRATORY (INHALATION) at 04:21

## 2023-11-22 RX ADMIN — BUSPIRONE HYDROCHLORIDE 15 MG: 5 TABLET ORAL at 12:45

## 2023-11-22 RX ADMIN — PREDNISONE 20 MG: 20 TABLET ORAL at 09:18

## 2023-11-22 RX ADMIN — GABAPENTIN 250 MG: 250 SOLUTION ORAL at 15:53

## 2023-11-22 RX ADMIN — GUAIFENESIN 10 ML: 200 SOLUTION ORAL at 12:45

## 2023-11-22 RX ADMIN — NYSTATIN: 100000 POWDER TOPICAL at 15:52

## 2023-11-22 RX ADMIN — PREDNISONE 20 MG: 20 TABLET ORAL at 00:30

## 2023-11-22 RX ADMIN — BUSPIRONE HYDROCHLORIDE 15 MG: 5 TABLET ORAL at 15:52

## 2023-11-22 SDOH — SOCIAL STABILITY: SOCIAL INSECURITY: ARE YOU OR HAVE YOU BEEN THREATENED OR ABUSED PHYSICALLY, EMOTIONALLY, OR SEXUALLY BY ANYONE?: NO

## 2023-11-22 SDOH — ECONOMIC STABILITY: FOOD INSECURITY: WITHIN THE PAST 12 MONTHS, THE FOOD YOU BOUGHT JUST DIDN'T LAST AND YOU DIDN'T HAVE MONEY TO GET MORE.: NEVER TRUE

## 2023-11-22 SDOH — SOCIAL STABILITY: SOCIAL NETWORK
IN A TYPICAL WEEK, HOW MANY TIMES DO YOU TALK ON THE PHONE WITH FAMILY, FRIENDS, OR NEIGHBORS?: MORE THAN THREE TIMES A WEEK

## 2023-11-22 SDOH — SOCIAL STABILITY: SOCIAL NETWORK: ARE YOU MARRIED, WIDOWED, DIVORCED, SEPARATED, NEVER MARRIED, OR LIVING WITH A PARTNER?: WIDOWED

## 2023-11-22 SDOH — HEALTH STABILITY: MENTAL HEALTH: HOW OFTEN DO YOU HAVE A DRINK CONTAINING ALCOHOL?: NEVER

## 2023-11-22 SDOH — SOCIAL STABILITY: SOCIAL INSECURITY
WITHIN THE LAST YEAR, HAVE YOU BEEN KICKED, HIT, SLAPPED, OR OTHERWISE PHYSICALLY HURT BY YOUR PARTNER OR EX-PARTNER?: NO

## 2023-11-22 SDOH — SOCIAL STABILITY: SOCIAL INSECURITY
WITHIN THE LAST YEAR, HAVE TO BEEN RAPED OR FORCED TO HAVE ANY KIND OF SEXUAL ACTIVITY BY YOUR PARTNER OR EX-PARTNER?: NO

## 2023-11-22 SDOH — SOCIAL STABILITY: SOCIAL INSECURITY: HAS ANYONE EVER THREATENED TO HURT YOUR FAMILY OR YOUR PETS?: NO

## 2023-11-22 SDOH — ECONOMIC STABILITY: FOOD INSECURITY: WITHIN THE PAST 12 MONTHS, YOU WORRIED THAT YOUR FOOD WOULD RUN OUT BEFORE YOU GOT MONEY TO BUY MORE.: NEVER TRUE

## 2023-11-22 SDOH — ECONOMIC STABILITY: HOUSING INSECURITY
IN THE LAST 12 MONTHS, WAS THERE A TIME WHEN YOU DID NOT HAVE A STEADY PLACE TO SLEEP OR SLEPT IN A SHELTER (INCLUDING NOW)?: NO

## 2023-11-22 SDOH — ECONOMIC STABILITY: INCOME INSECURITY: IN THE PAST 12 MONTHS, HAS THE ELECTRIC, GAS, OIL, OR WATER COMPANY THREATENED TO SHUT OFF SERVICE IN YOUR HOME?: NO

## 2023-11-22 SDOH — SOCIAL STABILITY: SOCIAL INSECURITY: DO YOU FEEL UNSAFE GOING BACK TO THE PLACE WHERE YOU ARE LIVING?: NO

## 2023-11-22 SDOH — SOCIAL STABILITY: SOCIAL INSECURITY: WITHIN THE LAST YEAR, HAVE YOU BEEN AFRAID OF YOUR PARTNER OR EX-PARTNER?: NO

## 2023-11-22 SDOH — SOCIAL STABILITY: SOCIAL INSECURITY: WITHIN THE LAST YEAR, HAVE YOU BEEN HUMILIATED OR EMOTIONALLY ABUSED IN OTHER WAYS BY YOUR PARTNER OR EX-PARTNER?: NO

## 2023-11-22 SDOH — SOCIAL STABILITY: SOCIAL NETWORK
DO YOU BELONG TO ANY CLUBS OR ORGANIZATIONS SUCH AS CHURCH GROUPS UNIONS, FRATERNAL OR ATHLETIC GROUPS, OR SCHOOL GROUPS?: NO

## 2023-11-22 SDOH — SOCIAL STABILITY: SOCIAL NETWORK: HOW OFTEN DO YOU ATTEND CHURCH OR RELIGIOUS SERVICES?: NEVER

## 2023-11-22 SDOH — ECONOMIC STABILITY: INCOME INSECURITY: IN THE LAST 12 MONTHS, WAS THERE A TIME WHEN YOU WERE NOT ABLE TO PAY THE MORTGAGE OR RENT ON TIME?: NO

## 2023-11-22 SDOH — SOCIAL STABILITY: SOCIAL INSECURITY: DOES ANYONE TRY TO KEEP YOU FROM HAVING/CONTACTING OTHER FRIENDS OR DOING THINGS OUTSIDE YOUR HOME?: NO

## 2023-11-22 SDOH — ECONOMIC STABILITY: INCOME INSECURITY: HOW HARD IS IT FOR YOU TO PAY FOR THE VERY BASICS LIKE FOOD, HOUSING, MEDICAL CARE, AND HEATING?: NOT HARD AT ALL

## 2023-11-22 SDOH — HEALTH STABILITY: MENTAL HEALTH
STRESS IS WHEN SOMEONE FEELS TENSE, NERVOUS, ANXIOUS, OR CAN'T SLEEP AT NIGHT BECAUSE THEIR MIND IS TROUBLED. HOW STRESSED ARE YOU?: TO SOME EXTENT

## 2023-11-22 SDOH — SOCIAL STABILITY: SOCIAL INSECURITY: WERE YOU ABLE TO COMPLETE ALL THE BEHAVIORAL HEALTH SCREENINGS?: YES

## 2023-11-22 SDOH — SOCIAL STABILITY: SOCIAL INSECURITY: DO YOU FEEL ANYONE HAS EXPLOITED OR TAKEN ADVANTAGE OF YOU FINANCIALLY OR OF YOUR PERSONAL PROPERTY?: NO

## 2023-11-22 SDOH — SOCIAL STABILITY: SOCIAL NETWORK: HOW OFTEN DO YOU GET TOGETHER WITH FRIENDS OR RELATIVES?: MORE THAN THREE TIMES A WEEK

## 2023-11-22 SDOH — HEALTH STABILITY: MENTAL HEALTH: HOW OFTEN DO YOU HAVE 6 OR MORE DRINKS ON ONE OCCASION?: NEVER

## 2023-11-22 SDOH — HEALTH STABILITY: PHYSICAL HEALTH: ON AVERAGE, HOW MANY MINUTES DO YOU ENGAGE IN EXERCISE AT THIS LEVEL?: 140 MIN

## 2023-11-22 SDOH — HEALTH STABILITY: MENTAL HEALTH: HOW MANY STANDARD DRINKS CONTAINING ALCOHOL DO YOU HAVE ON A TYPICAL DAY?: PATIENT DOES NOT DRINK

## 2023-11-22 SDOH — ECONOMIC STABILITY: TRANSPORTATION INSECURITY
IN THE PAST 12 MONTHS, HAS LACK OF TRANSPORTATION KEPT YOU FROM MEETINGS, WORK, OR FROM GETTING THINGS NEEDED FOR DAILY LIVING?: NO

## 2023-11-22 SDOH — HEALTH STABILITY: PHYSICAL HEALTH: ON AVERAGE, HOW MANY DAYS PER WEEK DO YOU ENGAGE IN MODERATE TO STRENUOUS EXERCISE (LIKE A BRISK WALK)?: 5 DAYS

## 2023-11-22 SDOH — SOCIAL STABILITY: SOCIAL INSECURITY: ABUSE: ADULT

## 2023-11-22 SDOH — SOCIAL STABILITY: SOCIAL INSECURITY: ARE THERE ANY APPARENT SIGNS OF INJURIES/BEHAVIORS THAT COULD BE RELATED TO ABUSE/NEGLECT?: NO

## 2023-11-22 SDOH — SOCIAL STABILITY: SOCIAL INSECURITY: HAVE YOU HAD THOUGHTS OF HARMING ANYONE ELSE?: NO

## 2023-11-22 SDOH — ECONOMIC STABILITY: HOUSING INSECURITY: IN THE LAST 12 MONTHS, HOW MANY PLACES HAVE YOU LIVED?: 2

## 2023-11-22 SDOH — SOCIAL STABILITY: SOCIAL NETWORK: HOW OFTEN DO YOU ATTENT MEETINGS OF THE CLUB OR ORGANIZATION YOU BELONG TO?: NEVER

## 2023-11-22 ASSESSMENT — COGNITIVE AND FUNCTIONAL STATUS - GENERAL
MOVING TO AND FROM BED TO CHAIR: TOTAL
EATING MEALS: A LITTLE
MOVING TO AND FROM BED TO CHAIR: TOTAL
EATING MEALS: A LITTLE
DRESSING REGULAR LOWER BODY CLOTHING: A LOT
TURNING FROM BACK TO SIDE WHILE IN FLAT BAD: A LOT
DRESSING REGULAR LOWER BODY CLOTHING: A LOT
DAILY ACTIVITIY SCORE: 12
MOBILITY SCORE: 8
CLIMB 3 TO 5 STEPS WITH RAILING: TOTAL
PATIENT BASELINE BEDBOUND: NO
PERSONAL GROOMING: A LOT
DRESSING REGULAR UPPER BODY CLOTHING: A LOT
WALKING IN HOSPITAL ROOM: TOTAL
MOVING FROM LYING ON BACK TO SITTING ON SIDE OF FLAT BED WITH BEDRAILS: A LOT
HELP NEEDED FOR BATHING: A LOT
WALKING IN HOSPITAL ROOM: TOTAL
STANDING UP FROM CHAIR USING ARMS: TOTAL
MOVING FROM LYING ON BACK TO SITTING ON SIDE OF FLAT BED WITH BEDRAILS: A LOT
DAILY ACTIVITIY SCORE: 13
TOILETING: TOTAL
TURNING FROM BACK TO SIDE WHILE IN FLAT BAD: A LOT
DRESSING REGULAR UPPER BODY CLOTHING: A LOT
TOILETING: A LOT
CLIMB 3 TO 5 STEPS WITH RAILING: TOTAL
HELP NEEDED FOR BATHING: A LOT
STANDING UP FROM CHAIR USING ARMS: TOTAL
MOBILITY SCORE: 8
PERSONAL GROOMING: A LOT

## 2023-11-22 ASSESSMENT — LIFESTYLE VARIABLES
AUDIT-C TOTAL SCORE: 0
PRESCIPTION_ABUSE_PAST_12_MONTHS: NO
SUBSTANCE_ABUSE_PAST_12_MONTHS: NO
SKIP TO QUESTIONS 9-10: 1
HOW OFTEN DO YOU HAVE 6 OR MORE DRINKS ON ONE OCCASION: NEVER
HOW OFTEN DO YOU HAVE A DRINK CONTAINING ALCOHOL: NEVER
SKIP TO QUESTIONS 9-10: 1
AUDIT-C TOTAL SCORE: 0
HOW MANY STANDARD DRINKS CONTAINING ALCOHOL DO YOU HAVE ON A TYPICAL DAY: PATIENT DOES NOT DRINK
AUDIT-C TOTAL SCORE: 0

## 2023-11-22 ASSESSMENT — PAIN SCALES - GENERAL
PAINLEVEL_OUTOF10: 0 - NO PAIN
PAINLEVEL_OUTOF10: 0 - NO PAIN

## 2023-11-22 ASSESSMENT — COLUMBIA-SUICIDE SEVERITY RATING SCALE - C-SSRS
2. HAVE YOU ACTUALLY HAD ANY THOUGHTS OF KILLING YOURSELF?: NO
6. HAVE YOU EVER DONE ANYTHING, STARTED TO DO ANYTHING, OR PREPARED TO DO ANYTHING TO END YOUR LIFE?: NO
1. IN THE PAST MONTH, HAVE YOU WISHED YOU WERE DEAD OR WISHED YOU COULD GO TO SLEEP AND NOT WAKE UP?: NO

## 2023-11-22 ASSESSMENT — ACTIVITIES OF DAILY LIVING (ADL)
HEARING - LEFT EAR: FUNCTIONAL
ASSISTIVE_DEVICE: TRAPEZE
BATHING: NEEDS ASSISTANCE
GROOMING: NEEDS ASSISTANCE
WALKS IN HOME: UNABLE TO ASSESS
ADEQUATE_TO_COMPLETE_ADL: YES
LACK_OF_TRANSPORTATION: NO
JUDGMENT_ADEQUATE_SAFELY_COMPLETE_DAILY_ACTIVITIES: YES
HEARING - RIGHT EAR: FUNCTIONAL
DRESSING YOURSELF: NEEDS ASSISTANCE
TOILETING: NEEDS ASSISTANCE
FEEDING YOURSELF: NEEDS ASSISTANCE
PATIENT'S MEMORY ADEQUATE TO SAFELY COMPLETE DAILY ACTIVITIES?: YES

## 2023-11-22 ASSESSMENT — PATIENT HEALTH QUESTIONNAIRE - PHQ9
SUM OF ALL RESPONSES TO PHQ9 QUESTIONS 1 & 2: 0
2. FEELING DOWN, DEPRESSED OR HOPELESS: NOT AT ALL
1. LITTLE INTEREST OR PLEASURE IN DOING THINGS: NOT AT ALL

## 2023-11-22 ASSESSMENT — PAIN - FUNCTIONAL ASSESSMENT
PAIN_FUNCTIONAL_ASSESSMENT: 0-10
PAIN_FUNCTIONAL_ASSESSMENT: 0-10

## 2023-11-22 NOTE — SIGNIFICANT EVENT
Patient is known to me. I placed 20 F PEG tube in August 2021. Tube is functioning; external bumper repositioned at 5 cm; no tube replacement needed; plans discussed with hospitalist

## 2023-11-22 NOTE — PROGRESS NOTES
Physical Therapy                 Therapy Communication Note    Patient Name: Idalia Ramsey  MRN: [id number]  Today's Date: 11/22/2023     Discipline: Physical Therapy    Missed Visit Reason: Missed Visit Reason:  Screen and D/C. Pt. lives with her daughter and is dependent for transfers (Kiley lift) and all self care at home. Pt. is at her functional baseline and does not demonstrate any skilled therapy needs at this time. Will D/C P.T.    Missed Time: Attempt    Comment:

## 2023-11-22 NOTE — H&P
Medical Group History and Physical    ASSESSMENT & PLAN:     COPD exacerbation  Chronic respiratory failure with hypoxia  Previously treated by PCP for pneumonia, completed antibiotics, now developing increased dyspnea with orthopnea.  88% on room air, not in acute distress.  - Prednisone, duo nebs q4hr, antitussives  - supportive care  - PT/OT eval    Dysphagia and Aphagia - High Risk Aspiration  Hx CVA  - Aphasia at baseline, uses some sign language, daughter at bedside to assist with communication    Pericardial effusion   - chronic with new changes  - Echo - eval of pericardial effusion; enlarging - measures 1.3 cm from 1 cm on prior imaging from [2021]  - Consider cardiology consult after echo    VTE Prophylaxis: Heparin subcu    Rose Mark, APRN-CNP    HISTORY OF PRESENT ILLNESS:   Chief Complaint: shortness of breath     History Of Present Illness:    Idalia Ramsey is a 53 y.o. female with a significant past medical history of HTN, COPD, stroke, aphasia at baseline with residual left-sided weakness, and chronic pericardial effusion presenting to [place] ED with complaints of worsening shortness of breath seen by PCP and treated for pneumonia has completed antibiotics outpatient.  Now developing increased dyspnea, accompanied by orthopnea, pOx 88% on room air, not in acute distress;  supplemental o2 at 2 L NC.     CXR negative for any acute cardiopulmonary process, CTA negative for PE, but identified 1.3 cm pericardial effusion noted to be larger than 1 cm measurement noted on imaging from 2021.  No evidence of tachycardia, fever, or hypotension.  Follow-up echo in AM and consider cardiology consult of new findings at that time.    Review of systems: 10 point review of systems is otherwise negative except as mentioned above.    PAST HISTORIES:       Past Medical History:  Medical Problems       Problem List       * (Principal) COPD (chronic obstructive pulmonary disease) (CMS/[facility])    Aphasia  "following cerebral infarction    Acid reflux    Anxiety    Muscle spasticity    Insomnia    Hyperlipidemia    HTN (hypertension)    History of completed stroke    Annual physical exam    Abnormal posture    Acquired nystagmus    Astigmatism, bilateral    Bilateral presbyopia    Coordination abnormal    COPD, mild (CMS/HCC)    Depression    Diplopia    Dysarthria following cerebral infarction    Dysarthria, post-stroke    Dysphagia    Esotropia    History of cardioembolic cerebrovascular accident (CVA)    Hypermetropia of both eyes    Stroke (CMS/HCC)    S/P percutaneous endoscopic gastrostomy (PEG) tube placement (CMS/HCC)           Past Surgical History:  Past Surgical History:   Procedure Laterality Date    OTHER SURGICAL HISTORY  2021     section    OTHER SURGICAL HISTORY  2021    Percutaneous endoscopic gastrostomy tube insertion    OTHER SURGICAL HISTORY  2021    Tonsillectomy          Social History:  She reports that she has never smoked. She has never used smokeless tobacco. She reports that she does not drink alcohol and does not use drugs.    Family History:  Family History   Problem Relation Name Age of Onset    Breast cancer Mother      Lymphoma Father      Uterine cancer Sister          Allergies:  Lisinopril, Sulfa (sulfonamide antibiotics), Sulfamethoxazole-trimethoprim, and Ultram [tramadol]    OBJECTIVE:       Last Recorded Vitals:  Vitals:    23 1612 23 1704 23 1800 23 1900   BP: 136/75 137/78 131/88 (!) 139/94   BP Location: Right arm  Right arm    Patient Position: Sitting  Sitting    Pulse: (!) 111 64 74 92   Resp: 20 20 20 20   Temp: 36.9 °C (98.4 °F) 36.9 °C (98.4 °F) 36.9 °C (98.4 °F) 36.9 °C (98.4 °F)   TempSrc: Temporal Temporal Temporal Temporal   SpO2: 94% 93% 93% 93%   Weight: 90.7 kg (200 lb)      Height: 1.575 m (5' 2\")          Last I/O:  No intake/output data recorded.    Physical Exam  Vitals and nursing note reviewed. "   Constitutional:       Appearance: She is obese.      Comments: Aphasia at baseline, sign language   HENT:      Head: Normocephalic and atraumatic.      Nose: Nose normal.      Mouth/Throat:      Mouth: Mucous membranes are moist.      Pharynx: Oropharynx is clear.   Eyes:      Extraocular Movements: Extraocular movements intact.      Conjunctiva/sclera: Conjunctivae normal.      Pupils: Pupils are equal, round, and reactive to light.   Cardiovascular:      Rate and Rhythm: Normal rate and regular rhythm.      Pulses: Normal pulses.      Heart sounds: Normal heart sounds.   Pulmonary:      Effort: Pulmonary effort is normal.      Breath sounds: Wheezing present.      Comments: Orthopnea, 2L NC, not on Home o2  Abdominal:      General: Abdomen is flat. Bowel sounds are normal.      Palpations: Abdomen is soft.      Comments: PEG tube - functioning for meds, or supplemental nutrition   Musculoskeletal:      Cervical back: Normal range of motion and neck supple.      Comments: Limited ROM   Skin:     General: Skin is warm and dry.      Capillary Refill: Capillary refill takes less than 2 seconds.   Neurological:      Mental Status: She is alert. Mental status is at baseline.      Comments: Aphasia at baseline, generalized weakness of Left side   Psychiatric:         Mood and Affect: Mood normal.         Behavior: Behavior normal.           Scheduled Medications    PRN Medications    Continuous Medications      Outpatient Medications:  Prior to Admission medications    Medication Sig Start Date End Date Taking? Authorizing Provider   albuterol 2.5 mg /3 mL (0.083 %) nebulizer solution Take 3 mL (2.5 mg) by nebulization 4 times a day as needed for wheezing or shortness of breath. 10/18/23 10/17/24  Amado Robert MD   amLODIPine (Norvasc) 10 mg tablet Take 1 tablet by mouth once daily 9/27/23   Amado Robert MD   aspirin 81 mg EC tablet Take 1 tablet (81 mg) by mouth every other day.    Historical  Provider, MD   atorvastatin (Lipitor) 80 mg tablet TAKE 1 TABLET BY MOUTH AT BEDTIME 4/11/23   Amado Robert MD   baclofen (Lioresal) 10 mg tablet Take 1 tablet (10 mg) by mouth 3 times a day as needed for muscle spasms. 6/27/23   Amado Robert MD   busPIRone (Buspar) 15 mg tablet Take 1 tablet (15 mg) by mouth 3 times a day.    Historical Provider, MD   cefuroxime (Ceftin) 500 mg tablet Take 1 tablet (500 mg) by mouth twice a day. 10/5/21   Historical Provider, MD   celecoxib (CeleBREX) 200 mg capsule Take 1 capsule (200 mg) by mouth once daily. 10/18/23 11/17/23  Amado Robert MD   citalopram (CeleXA) 10 mg/5 mL solution  1/7/23   Historical Provider, MD   citalopram (CeleXA) 40 mg tablet Take 1 tablet by mouth once daily 9/27/23   Amado Robert MD   cyclobenzaprine (Flexeril) 10 mg tablet Take 1 tablet (10 mg) by mouth 3 times a day as needed for muscle spasms. 10/18/23 2/15/24  Amado Robert MD   docusate sodium (Colace) 100 mg tablet Take 1-2 tablets (100-200 mg) by mouth 2 times a day. As directed 6/28/21   Historical Provider, MD   docusate sodium (Colace) 50 mg/5 mL oral liquid Take 10 mL (100 mg) by mouth once daily.    Historical Provider, MD   ergocalciferol (Vitamin D-2) 200 mcg/mL (8,000 unit/mL) drops Ergocalciferol 200 MCG/ML Oral Solution  Quantity: 60   Refills: 0  Start: 9-Jan-2023 1/8/23   Historical Provider, MD   famotidine (Pepcid) 20 mg tablet Take 1 tablet by mouth once daily  Patient taking differently: Take 1 tablet (20 mg) by mouth 2 times a day. 3/14/23   Amado Robert MD   folic acid (Folvite) 1 mg tablet Take 1 tablet by mouth once daily 6/27/23   Amado Robert MD   gabapentin 250 mg/5 mL solution TAKE 5 ML BY MOUTH  THREE TIMES DAILY 8/30/23   Amado Robert MD   guaiFENesin (Robitussin) 100 mg/5 mL syrup Take 10 mL by mouth 2 times a day. 6/28/21   Historical Provider, MD   lidocaine (Lidoderm) 5 % patch Place 1  patch over 12 hours on the skin once daily. Remove & discard patch within 12 hours or as directed by MD. 10/12/23   Delfin Franklin PA-C   loratadine (Claritin) 10 mg tablet Take 1 tablet (10 mg) by mouth once daily. 11/14/22   Historical Provider, MD   montelukast (Singulair) 10 mg tablet Take 1 tablet (10 mg) by mouth once daily at bedtime. 8/30/23   Amado Robert MD   nebulizer and compressor device Use 4 times a day as needed with albuterol 2.5mg/3ml solution 10/18/23   Amado Robert MD   nystatin (Mycostatin) 100,000 unit/gram powder Apply topically 2 times a day. Morning and afternoon 4/11/22   Historical Provider, MD   omeprazole (PriLOSEC) 20 mg DR capsule Take 1 capsule (20 mg) by mouth once daily. Do not crush or chew. 10/18/23 4/15/24  Amado Robert MD   sennosides (SENNA) 8.6 mg capsule 1 capsule (8.6 mg) 2 times a day. 6/28/21   Historical Provider, MD   sennosides (Senokot) 8.6 mg tablet Take 2 tablets (17.2 mg) by mouth once daily at bedtime.    Historical Provider, MD   traZODone (Desyrel) 100 mg tablet Take 1 tablet (100 mg) by mouth once daily at bedtime. 6/13/23   Amado Robert MD       LABS AND IMAGING:     Labs:  Results for orders placed or performed during the hospital encounter of 11/21/23 (from the past 24 hour(s))   Sars-CoV-2 and Influenza A/B PCR   Result Value Ref Range    Flu A Result Not Detected Not Detected    Flu B Result Not Detected Not Detected    Coronavirus 2019, PCR Not Detected Not Detected   CBC and Auto Differential   Result Value Ref Range    WBC 11.0 4.4 - 11.3 x10*3/uL    nRBC 0.0 0.0 - 0.0 /100 WBCs    RBC 4.84 4.00 - 5.20 x10*6/uL    Hemoglobin 13.7 12.0 - 16.0 g/dL    Hematocrit 42.3 36.0 - 46.0 %    MCV 87 80 - 100 fL    MCH 28.3 26.0 - 34.0 pg    MCHC 32.4 32.0 - 36.0 g/dL    RDW 14.9 (H) 11.5 - 14.5 %    Platelets 308 150 - 450 x10*3/uL    Neutrophils % 69.9 40.0 - 80.0 %    Immature Granulocytes %, Automated 0.3 0.0 - 0.9 %     Lymphocytes % 17.0 13.0 - 44.0 %    Monocytes % 4.3 2.0 - 10.0 %    Eosinophils % 7.8 0.0 - 6.0 %    Basophils % 0.7 0.0 - 2.0 %    Neutrophils Absolute 7.72 (H) 1.20 - 7.70 x10*3/uL    Immature Granulocytes Absolute, Automated 0.03 0.00 - 0.70 x10*3/uL    Lymphocytes Absolute 1.87 1.20 - 4.80 x10*3/uL    Monocytes Absolute 0.47 0.10 - 1.00 x10*3/uL    Eosinophils Absolute 0.86 (H) 0.00 - 0.70 x10*3/uL    Basophils Absolute 0.08 0.00 - 0.10 x10*3/uL   Comprehensive Metabolic Panel   Result Value Ref Range    Glucose 91 74 - 99 mg/dL    Sodium 138 136 - 145 mmol/L    Potassium 3.7 3.5 - 5.3 mmol/L    Chloride 100 98 - 107 mmol/L    Bicarbonate 29 21 - 32 mmol/L    Anion Gap 13 10 - 20 mmol/L    Urea Nitrogen 15 6 - 23 mg/dL    Creatinine 0.63 0.50 - 1.05 mg/dL    eGFR >90 >60 mL/min/1.73m*2    Calcium 9.6 8.6 - 10.3 mg/dL    Albumin 4.5 3.4 - 5.0 g/dL    Alkaline Phosphatase 123 (H) 33 - 110 U/L    Total Protein 8.2 6.4 - 8.2 g/dL    AST 15 9 - 39 U/L    Bilirubin, Total 0.7 0.0 - 1.2 mg/dL    ALT 17 7 - 45 U/L   Magnesium   Result Value Ref Range    Magnesium 1.93 1.60 - 2.40 mg/dL   D-Dimer, Quantitative VTE Exclusion   Result Value Ref Range    D-Dimer, Quantitative VTE Exclusion 588 (H) <=500 ng/mL FEU   B-Type Natriuretic Peptide   Result Value Ref Range    BNP 24 0 - 99 pg/mL   Lactate   Result Value Ref Range    Lactate 1.3 0.4 - 2.0 mmol/L   Troponin I, High Sensitivity, Initial   Result Value Ref Range    Troponin I, High Sensitivity <3 0 - 13 ng/L   Troponin, High Sensitivity, 1 Hour   Result Value Ref Range    Troponin I, High Sensitivity <3 0 - 13 ng/L        Imaging:  CT angio chest for pulmonary embolism   Final Result   Interval enlargement of pre-existing pericardial effusion which now   measures between 1.3 cm in maximal thickness. This previously measure   no greater than 1 cm.        No acute pulmonary embolism or other acute cardiopulmonary process.        Distended stomach with partially  visualized percutaneous gastrostomy   tube.             MACRO:   None.        Signed by: Agusto Ortega 11/21/2023 6:16 PM   Dictation workstation:   [id number]      XR chest 1 view   Final Result   Limited inspiration. No acute pulmonary disease.        MACRO:   none        Signed by: Bridgett Bonilla 11/21/2023 4:38 PM   Dictation workstation:   [id number]

## 2023-11-22 NOTE — PROGRESS NOTES
Speech-Language Pathology    Inpatient Speech-Language Pathology Clinical Swallow Evaluation    Patient Name: Idalia Ramsey  MRN: [id number]  Today's Date: 11/22/2023    Time In: 1135   Time Out: 1200   Total Time: 25 minutes         Current Problem:   1. COPD (chronic obstructive pulmonary disease) (CMS/HCC)  Transthoracic Echo (TTE) Complete    Transthoracic Echo (TTE) Complete      2. Pericardial effusion  Transthoracic Echo (TTE) Complete    Transthoracic Echo (TTE) Complete      3. Shortness of breath  Transthoracic Echo (TTE) Complete      4. Other specified symptoms and signs involving the circulatory and respiratory systems  Transthoracic Echo (TTE) Complete            Recommendations:  Solid Diet Recommendations : Pureed/extremely thick  (IDDSI Level 4)  Liquid Diet Recommendations: Honey thick/liquidised/moderately thick (IDDS Level 3)      Assessment:  Prognosis: Fair  Medical Staff Made Aware: Yes  Strengths: Motivation, Family/Caregiver Suppport  Barriers: Comorbidities    Pt was seen during lunch; lunch had puree food, ice cream, jello, regular pepsi, and italian ice. 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 Diminished oral motor function as characterized by minimal lingual movement to L/R and R facial droop when smiling. Explained to pt that results of previous MBS completed on Oct of 2022 yielded that pt had penetration with thin liquids; demonstrated how to thicken liquids to recommended honey consistency per previous MBS report. Pt and child reported consuming liquids via PEG tube. Pt and child stated that they do thicken liquids when taken orally. No overt s/s of aspiration observed during puree and honey thick liquids via cup trials. Decreased oral manipulation observed; pt required towel on front of chest. Observed R sided leakage when drinking via cup. Pt and child reported no  coughing/choking during meals. Recommend puree diet with honey thick liquids and continued skilled speech services to continue to assess pt during meals.  Patient CT from yesterday revealed clear lungs.  After bedside swallow evaluation was completed, physician ordered a modified barium swallow study.  Unable to complete modified barium swallow study due to therapist at outpatient this afternoon.  Notified physician of speech therapy not being able to complete modified barium swallow study today or tomorrow during the holiday.  Modified barium swallow study will be completed on Friday a.m. if patient remains in the hospital.  If patient is discharged from the hospital the modified barium swallow study can be completed as an outpatient service.    Plan:  SLP Frequency: 2x per week  Duration: 30 days  Diet Recommendations: Solid, Liquid  Solid Consistency: Pureed/extremely thick (IDDSI Level 4)  Liquid Consistency: Honey thick/moderately thick (IDDS Level 3)  Next Treatment Priority: MBS Friday  Discussed POC: Patient, Caregiver/family, Nursing  Discussed Risks/Benefits: Yes, Caregiver/Family, Patient, Nursing  Patient/Caregiver Agreeable: Yes      Subjective   Pt was lying in bed at a 45 degree angle upon arrival. Child was present during assessment and translated pt's verbal and sign communication secondary to residual CVA affects. Pt was awake, alert, and cooperative throughout assessment.      General Visit Information:  Reason for Referral: rule out aspiration  Patient Seen During This Visit: Yes  Reviewed Procedures and Risks: Yes  Current Diet : puree/honey thick      Objective   Pt will tolerate puree diet with honey thick liquids with no overt s/s of aspiration and/or pulmonary compromise for 100% of meals.  Pt and child will demonstrate verbal understanding of how to thicken liquids to honey thick at 100% accuracy.  **More goals may be warranted pending MBS completion and results     Baseline  Assessment:  Respiratory Status: Room air  Behavior/Cognition: Alert, Cooperative, Pleasant mood  Patient Positioning: Upright in Bed      Pain:  Pain Assessment: 0-10  Pain Score: 0 - No pain       Oral/Motor Assessment:  Dentition: Adequate/Natural  Oral Motor: Impaired Function  Facial Symmetry: Right droop  Labial ROM: Reduced right (minimal movement to either side)  Lingual ROM: Other (Comment) (minimal movement to either side)      Consistencies Trialed:  Consistencies Trialed: Yes  Consistencies Trialed: Honey thick/liquidised/moderately thick (IDDSI Level 3) - Cup, Pureed/extremely thick (IDDSI Level 4)      Clinical Observations:  Patient Positioning: Upright in Bed      Inpatient Education:  Adult Inpatient Education  Individual(s) Educated: Patient, Child  Risk and Benefits Discussed with Patient/Caregiver/Other: yes  Patient/Caregiver Demonstrated Understanding: yes  Plan of Care Discussed and Agreed Upon: yes  Patient Response to Education: Patient/Caregiver Verbalized Understanding of Information  Educated pt and child on role of SLP, purpose of swallow assessment, results of swallow assessment, and how to thicken liquids to honey thick.

## 2023-11-22 NOTE — PROGRESS NOTES
Occupational Therapy                 Therapy Communication Note    Patient Name: Idalia Ramsey  MRN: [id number]  Today's Date: 11/22/2023     Discipline: Occupational Therapy    Missed Visit Reason: Missed Visit Reason:  (Screen and D/C. Pt. lives wiht her daughter and is dependent for transfers (Kiley lift) and all self care at home. Pt. is at her functional baseline and does not demonstrate any skilled therapy needs at this time) Will discharge OT orders.

## 2023-11-22 NOTE — CONSULTS
"Inpatient consult to Cardiology  Consult performed by: Hermilo Morrison DO  Consult ordered by: Aneesh Vela MD  Reason for consult: Pericardial effusion      Cardiology Consult Note      Date:   11/22/2023  Patient name:  Idalia Ramsey  Date of admission:  11/21/2023  4:03 PM  MRN:   [id number]  YOB: 1970  Time of Consult:  10:57 AM    Consulting Cardiologist: Dr. Hermilo Mao APRN, CNP  Primary Cardiologist:   No previous cardiologist     Referring Provider: Dr. Vela      Admission Diagnosis:     COPD (chronic obstructive pulmonary disease) (CMS/[facility])      History of Present Illness:      Idalia Ramsey is a 53 y.o.  female patient who is being at the request of Dr. Vela for inpatient consultation of  pericardial effusion . She was admitted on 11/21/2023.  Previous [facility] and [facility] records have been reviewed in detail.      Patient presented to [facility] emergency department for chief complaint of shortness of breath.  Per ER documentation patient had chest x-ray 3 weeks ago and was placed on antibiotics for pneumonia and has finished her antibiotics but still having worsening difficulty breathing therefore presented to the emergency department.  Patient describes that she has had a cough and congestion that did not improve after she finished antibiotics approximately a week ago.  She denies chest pain, palpitations.  States that \"her lungs hurt\".  She has some sputum production with her cough, describes as small amount.   Patient has history of CVA with right-sided weakness and aphasia and history of pericardial effusion from prior imaging of CT scan of abdomen and pelvis in July 2021.  Part of evaluation in the emergency department included CT chest PE protocol that was negative for pulmonary embolism and showed moderate pericardial effusion with increase in size from 1 cm to 1.3 cm from prior imaging.  She was " treated with IV Solu-Medrol, DuoNeb nebulizer and is placed on telemetry unit.  This morning at time of cardiology consult patient states that her breathing is feeling better.  She is asking when her home medications will be resumed.    [facility] heart cardiology consult was placed for further evaluation of pericardial effusion.  Echocardiogram has been ordered by primary service.    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     History of CVA with aphasia and right-sided weakness  Acid reflux  Anxiety/depression  Muscle spasticity  Insomnia  Hyperlipidemia  Hypertension  COPD  Dysarthria  PEG tube placement  Former smoker    Past Surgical History:     Past Surgical History:   Procedure Laterality Date    OTHER SURGICAL HISTORY  2021     section    OTHER SURGICAL HISTORY  2021    Percutaneous endoscopic gastrostomy tube insertion    OTHER SURGICAL HISTORY  2021    Tonsillectomy    WRIST FRACTURE SURGERY         Family History:     Family History   Problem Relation Name Age of Onset    Breast cancer Mother      Lymphoma Father      Uterine cancer Sister         Social History:     Social History     Tobacco Use    Smoking status: Former     Packs/day: 0.50     Years: 44.00     Additional pack years: 0.00     Total pack years: 22.00     Types: Cigarettes     Quit date: 2018     Years since quittin.0    Smokeless tobacco: Never   Vaping Use    Vaping Use: Never used   Substance Use Topics    Alcohol use: Not Currently    Drug use: Never       CURRENT HOSPITAL MEDICATIONS    amLODIPine, 10  mg, oral, Daily  [START ON 11/23/2023] aspirin, 81 mg, oral, Every other day  atorvastatin, 80 mg, oral, Nightly  busPIRone, 15 mg, oral, TID  docusate sodium, 100 mg, oral, Daily  [START ON 11/23/2023] esomeprazole, 20 mg, g-tube, Daily before breakfast  influenza, 0.5 mL, intramuscular, During hospitalization  folic acid, 1 mg, oral, Daily  gabapentin, 250 mg, g-tube, q8h GLORY  guaiFENesin, 10 mL, oral, BID  nystatin, , Topical, BID  perflutren lipid microspheres, 0.5-10 mL of dilution, intravenous, Once in imaging  perflutren protein A microsphere, 0.5 mL, intravenous, Once in imaging  predniSONE, 20 mg, oral, BID  senna, 5 mL, g-tube, Nightly  sulfur hexafluoride microsphr, 2 mL, intravenous, Once in imaging  traZODone, 100 mg, oral, Nightly         Current Outpatient Medications   Medication Instructions    albuterol 2.5 mg, nebulization, 4 times daily PRN    amLODIPine (NORVASC) 10 mg, oral, Daily    aspirin 81 mg, oral, Every other day    atorvastatin (Lipitor) 80 mg tablet TAKE 1 TABLET BY MOUTH AT BEDTIME    baclofen (LIORESAL) 10 mg, oral, 3 times daily PRN    busPIRone (BUSPAR) 15 mg, oral, 3 times daily    citalopram (CeleXA) 40 mg tablet Take 1 tablet by mouth once daily    cyclobenzaprine (FLEXERIL) 10 mg, oral, 3 times daily PRN    docusate sodium (Colace) 50 mg/5 mL oral liquid 10 mL, oral, Daily    ergocalciferol (Vitamin D-2) 200 mcg/mL (8,000 unit/mL) drops Ergocalciferol 200 MCG/ML Oral Solution  Quantity: 60   Refills: 0  Start: 9-Jan-2023    famotidine (Pepcid) 20 mg tablet Take 1 tablet by mouth once daily    folic acid (Folvite) 1 mg tablet Take 1 tablet by mouth once daily    gabapentin 250 mg/5 mL solution TAKE 5 ML BY MOUTH  THREE TIMES DAILY    guaiFENesin (Robitussin) 100 mg/5 mL syrup 10 mL, oral, 2 times daily    lidocaine (Lidoderm) 5 % patch 1 patch, transdermal, Daily, Remove & discard patch within 12 hours or as directed by MD.    loratadine (Claritin) 10 mg tablet 1 tablet, oral,  "Daily    montelukast (SINGULAIR) 10 mg, oral, Nightly    nebulizer and compressor device Use 4 times a day as needed with albuterol 2.5mg/3ml solution    nystatin (Mycostatin) 100,000 unit/gram powder Topical, 2 times daily, Morning and afternoon    omeprazole (PRILOSEC) 20 mg, oral, Daily, Do not crush or chew.    sennosides (SENNA) 8.6 mg, 2 times daily    traZODone (DESYREL) 100 mg, oral, Nightly        Review of Systems:      12 point review of systems was obtained in limited detail due to patient's aphasia and no family present at time of cardiology consult and is negative other than that detailed above.      Vital Signs:     Vitals:    11/21/23 2320 11/22/23 0115 11/22/23 0422 11/22/23 0722   BP: (!) 152/104   135/89   BP Location: Left arm   Right arm   Patient Position: Sitting   Lying   Pulse: 102   84   Resp: 18   15   Temp: 37.4 °C (99.3 °F)   35.6 °C (96.1 °F)   TempSrc: Temporal   Temporal   SpO2: 92%  95% 92%   Weight: 96 kg (211 lb 10.3 oz)      Height:  1.575 m (5' 2\")         Intake/Output Summary (Last 24 hours) at 11/22/2023 1057  Last data filed at 11/22/2023 0115  Gross per 24 hour   Intake --   Output 300 ml   Net -300 ml       Wt Readings from Last 4 Encounters:   11/21/23 96 kg (211 lb 10.3 oz)   10/18/23 90.7 kg (200 lb)   10/12/23 88.5 kg (195 lb)   08/23/23 90.7 kg (200 lb)       Physical Examination:     GENERAL APPEARANCE: Obese female,well developed, well nourished, in no acute distress.  CHEST: Symmetric and non-tender.  INTEGUMENT: Skin warm and dry, without gross excoriationis or lesions.  HEENT: No gross abnormalities of conjunctiva, teeth, gums, oral mucosa  NECK: Supple, no JVD, no bruit. Thyroid not palpable. Carotid upstrokes normal.  NEURO/PSHCY: Alert and oriented x3; appropriate behavior and responses. Aphasic, able to answer questions with 1 or 2 word answers  LUNGS: Respirations even and unlabored, breath sounds diminished in bases but clear   HEART: Rate and rhythm regular " "with no evident murmur; no gallop appreciated. There are no rubs, clicks or heaves. PMI nondisplaced.  Telemetry normal sinus rhythm with heart rates in the 90s  ABDOMEN: Soft, nontender, positive bowel sounds, PEG tube in place upper mid abdomen  MUSCULOSKELETAL: No joint deformity  EXTREMITIES: Warm with good color, no clubbing or cyanois. There is no edema noted.  PERIPHERAL VASCULAR: Pulses present and equally palpable; 2+ throughout.       Lab:     CBC:   Lab Results   Component Value Date    WBC 11.0 11/21/2023    RBC 4.84 11/21/2023    HGB 13.7 11/21/2023    HCT 42.3 11/21/2023     11/21/2023        CMP:    Lab Results   Component Value Date     11/21/2023    K 3.7 11/21/2023     11/21/2023    CO2 29 11/21/2023    BUN 15 11/21/2023    CREATININE 0.63 11/21/2023    GLUCOSE 91 11/21/2023    CALCIUM 9.6 11/21/2023       Magnesium:    Lab Results   Component Value Date    MG 1.93 11/21/2023       Lipid Profile:    No results found for: \"CHLPL\", \"TRIG\", \"HDL\", \"LDLCALC\", \"LDLDIRECT\"    TSH:    No results found for: \"TSH\"    BNP:   Lab Results   Component Value Date    BNP 24 11/21/2023        PT/INR:    No results found for: \"PROTIME\", \"INR\"    HgBA1c:    No results found for: \"HGBA1C\"    BMP:  Lab Results   Component Value Date     11/21/2023    K 3.7 11/21/2023     11/21/2023    CO2 29 11/21/2023    BUN 15 11/21/2023    CREATININE 0.63 11/21/2023       CBC:  Lab Results   Component Value Date    WBC 11.0 11/21/2023    RBC 4.84 11/21/2023    HGB 13.7 11/21/2023    HCT 42.3 11/21/2023    MCV 87 11/21/2023    MCH 28.3 11/21/2023    MCHC 32.4 11/21/2023    RDW 14.9 (H) 11/21/2023     11/21/2023       Cardiac Enzymes:    Lab Results   Component Value Date    TROPHS <3 11/21/2023    TROPHS <3 11/21/2023    TROPHS <3 01/03/2023       Hepatic Function Panel:    Lab Results   Component Value Date    ALKPHOS 123 (H) 11/21/2023    ALT 17 11/21/2023    AST 15 11/21/2023    PROT 8.2 " 11/21/2023    BILITOT 0.7 11/21/2023       Diagnostic Studies:     CT angio chest for pulmonary embolism    Result Date: 11/21/2023  Interpreted By:  Agusto Ortega, STUDY: CT ANGIO CHEST FOR PULMONARY EMBOLISM;  11/21/2023 5:43 pm   INDICATION: Signs/Symptoms:sob, tachy, elevated dimer.   COMPARISON: CT abdomen/pelvis 07/20/2021   ACCESSION NUMBER(S): [id number]   ORDERING CLINICIAN: LIZ STANFORD   TECHNIQUE: Contiguous axial images of the chest were obtained after the intravenous administration of 75 mL Omnipaque 350 contrast using angiographic PE protocol. Coronal and sagittal reformatted images were reconstructed from the axial data. MIP images were created on an independent workstation and reviewed.       FINDINGS: MEDIASTINUM AND LYMPH NODES:  No enlarged intrathoracic or axillary lymph nodes by imaging criteria.  The esophagus appears within normal limits. No pneumomediastinum.   VESSELS:  Normal caliber aorta without dissection. Mild aortic atherosclerosis.  No pulmonary embolism.   HEART: The heart is normal in size. There is a moderate pericardial effusion that is slightly increased in size from 07/20/2021. This measures 1.3 cm in maximal thickness. There is severe three-vessel coronary artery calcification.   LUNG, AIRWAYS, AND PLEURA: Trace mucus in the trachea. Mild scattered atelectasis, most pronounced in the left lower lobe. No consolidation, pulmonary edema, pleural effusion or pneumothorax.   OSSEOUS STRUCTURES: No acute osseous abnormality.   CHEST WALL SOFT TISSUES: No discernible abnormality.   UPPER ABDOMEN/OTHER: Stomach is distended. There is a partially visualized percutaneous gastrostomy tube.       Interval enlargement of pre-existing pericardial effusion which now measures between 1.3 cm in maximal thickness. This previously measure no greater than 1 cm.   No acute pulmonary embolism or other acute cardiopulmonary process.   Distended stomach with partially visualized percutaneous  gastrostomy tube.     MACRO: None.   Signed by: Agusto Ortega 11/21/2023 6:16 PM Dictation workstation:   [id number]    XR chest 1 view    Result Date: 11/21/2023  Interpreted By:  Bridgett Bonilla, STUDY: XR CHEST 1 VIEW;  11/21/2023 4:30 pm   INDICATION: Signs/Symptoms:sob.   COMPARISON: 11/03/2023   ACCESSION NUMBER(S): [id number]   ORDERING CLINICIAN: LIZ STANFORD   FINDINGS: The inspiration is suboptimal. Penetration is limited due to patient's size.   Cardiac silhouette appears enlarged. There are atherosclerotic changes of the aorta.   There is no consolidation or pleural fluid.   There appear to be degenerative changes of the spine.   COMPARISON OF FINDING: The lungs appear improved in appearance.       Limited inspiration. No acute pulmonary disease.   MACRO: none   Signed by: Bridgett Bonilla 11/21/2023 4:38 PM Dictation workstation:   [id number]      Radiology:     CT angio chest for pulmonary embolism   Final Result   Interval enlargement of pre-existing pericardial effusion which now   measures between 1.3 cm in maximal thickness. This previously measure   no greater than 1 cm.        No acute pulmonary embolism or other acute cardiopulmonary process.        Distended stomach with partially visualized percutaneous gastrostomy   tube.             MACRO:   None.        Signed by: Agusto Ortega 11/21/2023 6:16 PM   Dictation workstation:   [id number]      XR chest 1 view   Final Result   Limited inspiration. No acute pulmonary disease.        MACRO:   none        Signed by: Bridgett Bonilla 11/21/2023 4:38 PM   Dictation workstation:   [id number]      Transthoracic Echo (TTE) Complete    (Results Pending)       Problem List:     Patient Active Problem List   Diagnosis    Aphasia following cerebral infarction    Acid reflux    Anxiety    Muscle spasticity    Insomnia    Hyperlipidemia    HTN (hypertension)    History of completed stroke    Annual physical exam    Abnormal posture    Acquired nystagmus     Astigmatism, bilateral    Bilateral presbyopia    Coordination abnormal    COPD, mild (CMS/HCC)    Depression    Diplopia    Dysarthria following cerebral infarction    Dysarthria, post-stroke    Dysphagia    Esotropia    History of cardioembolic cerebrovascular accident (CVA)    Hypermetropia of both eyes    Stroke (CMS/HCC)    Other sequelae of cerebral infarction    S/P percutaneous endoscopic gastrostomy (PEG) tube placement (CMS/HCC)    COPD (chronic obstructive pulmonary disease) (CMS/HCC)       Assessment:   53-year-old female seen evaluate the bedside in the telemetry in conjunction with Juliana Mao RN, CNP.    Bedside examination evaluation performed by me.    Chart reviewed in detail discussed with the patient at length.    Impression:  Pericardial effusion, hemodynamically stable  COPD exacerbation, chronic respiratory failure with hypoxia being managed by primary service  History of CVA, dysphagia, aphasia, right-sided weakness  Hypertension    Plan:    Will obtain echocardiogram for further evaluation of pericardial effusion in order to make further recommendations.  Continue home antihypertensive medications once clarified.    Discussion:  Patient here with dyspnea.  Scanning chest indicated presence of pericardial fusion however this was apparently reported on an old scan of the abdomen in 2021.  Patient has no clinical history of this otherwise.  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  Echo is pending which will be reviewed and make further commentary.  If this is in fact a chronic situation may not necessarily need to have any specific instituted therapy although a trial of antiinflammatories could be a consideration along with colchicine.  Will await echo and make further commentary.    Hermilo Morrison, DO Juliana Mao CNP  Thank you for the opportunity to participate in the care of your patient.  Please do not hesitate to call if you have any questions.  Electronically signed by  Hermilo Morrison DO, on 11/22/2023 at 10:57 AM

## 2023-11-22 NOTE — DISCHARGE INSTRUCTIONS
It was nice caring for you during your stay at [facility]. As we discussed,    Please continue your prednisone and instructed, and your nebulizer treatments.  Please follow-up with speech therapy  Please follow-up with cardiology and your primary care doctor    Wishing you a healthy recovery!

## 2023-11-22 NOTE — PROGRESS NOTES
11/22/23 0936   Discharge Planning   Living Arrangements Children   Support Systems Children   Assistance Needed None   Type of Residence Private residence   Who is requesting discharge planning? Provider   Home or Post Acute Services None   Does the patient need discharge transport arranged? No   RoundTrip coordination needed? No   Has discharge transport been arranged? No     Patient has history of stroke is aphasic, daughter lives with patient and is primary care giver. Will speak with patient's daughter with regards to any discharge needs they may have.

## 2023-11-23 RX ORDER — IPRATROPIUM BROMIDE AND ALBUTEROL SULFATE 2.5; .5 MG/3ML; MG/3ML
3 SOLUTION RESPIRATORY (INHALATION) EVERY 4 HOURS PRN
Qty: 180 ML | Refills: 0 | Status: SHIPPED | OUTPATIENT
Start: 2023-11-23

## 2023-11-23 RX ORDER — PREDNISONE 20 MG/1
TABLET ORAL
Qty: 11 TABLET | Refills: 0 | Status: SHIPPED | OUTPATIENT
Start: 2023-11-23 | End: 2023-12-02

## 2023-11-24 ENCOUNTER — PATIENT OUTREACH (OUTPATIENT)
Dept: CARE COORDINATION | Facility: CLINIC | Age: 53
End: 2023-11-24
Payer: COMMERCIAL

## 2023-11-24 ENCOUNTER — DOCUMENTATION (OUTPATIENT)
Dept: CARE COORDINATION | Facility: CLINIC | Age: 53
End: 2023-11-24
Payer: COMMERCIAL

## 2023-11-24 DIAGNOSIS — J44.9 CHRONIC OBSTRUCTIVE PULMONARY DISEASE, UNSPECIFIED COPD TYPE (MULTI): ICD-10-CM

## 2023-11-24 NOTE — PROGRESS NOTES
Discharge Facility:[facility]  Discharge Diagnosis:J44.9 COPD  Admission Date:11/21/23  Discharge Date: 11/22/23    PCP Appointment Date:Task sent to office  Specialist Appointment Date: N/A  Hospital Encounter and Summary: Linked     2 call attempts made

## 2023-11-25 LAB
BACTERIA BLD CULT: NORMAL
BACTERIA BLD CULT: NORMAL

## 2023-11-25 NOTE — DISCHARGE SUMMARY
DISCHARGE DIAGNOSIS     COPD exacerbation  Chronic respiratory failure with hypoxia  History of CVA with dysphagia  Pericardial effusion      HOSPITAL COURSE AND DETAILS     Ms. Ramsey is a 53-year-old female with past medical history of CVA with dysphagia who presented to the emergency room with worsening shortness of breath.  She states that she has been having ongoing shortness of breath for several weeks, associated with cough.    She was treated for COPD exacerbation with prednisone and nebulizer treatment.  She had several diagnostic work-up to evaluate the etiology of her shortness of breath.  Her CT angio showed no evidence of pulmonary embolism, she had an echocardiogram which showed preserved EF.  It did demonstrate a pericardial effusion which was seen previously.  Seen by cardiology no further interventions at this point, outpatient follow-up.    Given her history of CVA, she did have a speech therapy evaluation, she will follow-up outpatient for a modified barium swallow which could be contributing to her chronic shortness of breath.    She will also follow-up with cardiology in regards to an ischemic work-up.    She was seen by general surgery for her PEG tube, no need for replacement of tube, external bumper repositioned.      Total time spent on discharge planning and coordination of care 40 minutes.  Discharge planning was discussed with patient, she understands and agrees with plan of care.    * Some of these notes were completed using Dragon voice recognition technology and may include unintended areas with respect to translation of word, typographical errors or primary areas which may not have been identified prior to finalization of the document.      Aneesh Vela MD    DISCHARGE PHYSICAL EXAM     Last Recorded Vitals:  Vitals:    11/22/23 0115 11/22/23 0422 11/22/23 0722 11/22/23 1425   BP:   135/89 113/77   BP Location:   Right arm    Patient Position:   Lying    Pulse:    "84 100   Resp:   15    Temp:   35.6 °C (96.1 °F) 36.6 °C (97.9 °F)   TempSrc:   Temporal    SpO2:  95% 92% 93%   Weight:       Height: 1.575 m (5' 2\")          Physical Exam      PHYSICAL EXAM:   GENERAL: Laying in bed, does not appear to be in any distress.   HEENT: HEAD: Normocephalic atraumatic.  Neck: Supple.  Eyes: Pupils are reactive to direct light.   CVS: S1, S2 heard. Regular rate and rhythm  LUNGS: Clear to auscultate bilaterally. No wheezing or rhonchi appreciated.  ABDOMEN: Soft, nontender to palpate. Positive bowel sounds. No guarding or rebound appreciated.  PEG tube in place.  NEUROLOGICAL: Dysphagia appreciated, lower extremity weakness appreciated which is chronic.  EXTREMITIES: No edema appreciated.  SKIN:  Grossly intact, warm and dry.          DISCHARGE MEDICATIONS        Your medication list        START taking these medications        Instructions Last Dose Given Next Dose Due   ipratropium-albuteroL 0.5-2.5 mg/3 mL nebulizer solution  Commonly known as: Duo-Neb      Take 3 mL by nebulization every 4 hours if needed for wheezing or shortness of breath.       predniSONE 20 mg tablet  Commonly known as: Deltasone  Start taking on: November 22, 2023      Take 1 tablet (20 mg) by mouth 2 times a day for 3 days, THEN 1 tablet (20 mg) once daily for 3 days, THEN 0.5 tablets (10 mg) once daily for 3 days.              CHANGE how you take these medications        Instructions Last Dose Given Next Dose Due   famotidine 20 mg tablet  Commonly known as: Pepcid  What changed: when to take this      Take 1 tablet by mouth once daily              CONTINUE taking these medications        Instructions Last Dose Given Next Dose Due   albuterol 2.5 mg /3 mL (0.083 %) nebulizer solution      Take 3 mL (2.5 mg) by nebulization 4 times a day as needed for wheezing or shortness of breath.       amLODIPine 10 mg tablet  Commonly known as: Norvasc      Take 1 tablet by mouth once daily       aspirin 81 mg EC tablet   "         atorvastatin 80 mg tablet  Commonly known as: Lipitor      TAKE 1 TABLET BY MOUTH AT BEDTIME       baclofen 10 mg tablet  Commonly known as: Lioresal      Take 1 tablet (10 mg) by mouth 3 times a day as needed for muscle spasms.       busPIRone 15 mg tablet  Commonly known as: Buspar           citalopram 40 mg tablet  Commonly known as: CeleXA      Take 1 tablet by mouth once daily       cyclobenzaprine 10 mg tablet  Commonly known as: Flexeril      Take 1 tablet (10 mg) by mouth 3 times a day as needed for muscle spasms.       docusate sodium 50 mg/5 mL oral liquid  Commonly known as: Colace           ergocalciferol 200 mcg/mL (8,000 unit/mL) drops  Commonly known as: Vitamin D-2           folic acid 1 mg tablet  Commonly known as: Folvite      Take 1 tablet by mouth once daily       gabapentin 250 mg/5 mL solution  Commonly known as: Neurontin      TAKE 5 ML BY MOUTH  THREE TIMES DAILY       guaiFENesin 100 mg/5 mL syrup  Commonly known as: Robitussin           lidocaine 5 % patch  Commonly known as: Lidoderm      Place 1 patch over 12 hours on the skin once daily. Remove & discard patch within 12 hours or as directed by MD.       loratadine 10 mg tablet  Commonly known as: Claritin           montelukast 10 mg tablet  Commonly known as: Singulair      Take 1 tablet (10 mg) by mouth once daily at bedtime.       nebulizer and compressor device      Use 4 times a day as needed with albuterol 2.5mg/3ml solution       nystatin 100,000 unit/gram powder  Commonly known as: Mycostatin           omeprazole 20 mg DR capsule  Commonly known as: PriLOSEC      Take 1 capsule (20 mg) by mouth once daily. Do not crush or chew.       sennosides 8.6 mg capsule  Commonly known as: SENNA           traZODone 100 mg tablet  Commonly known as: Desyrel      Take 1 tablet (100 mg) by mouth once daily at bedtime.              STOP taking these medications      celecoxib 200 mg capsule  Commonly known as: CeleBREX                   Where to Get Your Medications        These medications were sent to [id number]  [place], [place] - [address]  [address] [place] [address]      Phone: [phone]   ipratropium-albuteroL 0.5-2.5 mg/3 mL nebulizer solution  predniSONE 20 mg tablet           OUTPATIENT FOLLOW-UP     Future Appointments   Date Time Provider Department Center   12/11/2023  1:30 PM Noble Eli MD [id number] [facility]   1/30/2024  3:30 PM Kourtney Lopes MD [id number] [facility]   3/14/2024  2:40 PM Amado Robert MD [id number] [facility]   4/17/2024  2:00 PM Meng Panda OD [id number] [facility]

## 2023-12-05 ENCOUNTER — OFFICE VISIT (OUTPATIENT)
Dept: CARDIOLOGY | Facility: CLINIC | Age: 53
End: 2023-12-05
Payer: COMMERCIAL

## 2023-12-05 VITALS — HEART RATE: 81 BPM | DIASTOLIC BLOOD PRESSURE: 60 MMHG | SYSTOLIC BLOOD PRESSURE: 118 MMHG

## 2023-12-05 DIAGNOSIS — I10 HYPERTENSION, UNSPECIFIED TYPE: ICD-10-CM

## 2023-12-05 DIAGNOSIS — E78.2 MIXED HYPERLIPIDEMIA: ICD-10-CM

## 2023-12-05 DIAGNOSIS — J44.9 CHRONIC OBSTRUCTIVE PULMONARY DISEASE, UNSPECIFIED COPD TYPE (MULTI): Primary | ICD-10-CM

## 2023-12-05 DIAGNOSIS — Z86.73 HISTORY OF CARDIOEMBOLIC CEREBROVASCULAR ACCIDENT (CVA): ICD-10-CM

## 2023-12-05 PROBLEM — I31.39 PERICARDIAL EFFUSION (HHS-HCC): Status: ACTIVE | Noted: 2023-12-05

## 2023-12-05 PROCEDURE — 99213 OFFICE O/P EST LOW 20 MIN: CPT | Performed by: INTERNAL MEDICINE

## 2023-12-05 PROCEDURE — 1036F TOBACCO NON-USER: CPT | Performed by: INTERNAL MEDICINE

## 2023-12-05 PROCEDURE — 3074F SYST BP LT 130 MM HG: CPT | Performed by: INTERNAL MEDICINE

## 2023-12-05 PROCEDURE — 3008F BODY MASS INDEX DOCD: CPT | Performed by: INTERNAL MEDICINE

## 2023-12-05 PROCEDURE — 3078F DIAST BP <80 MM HG: CPT | Performed by: INTERNAL MEDICINE

## 2023-12-05 NOTE — PATIENT INSTRUCTIONS
Continue same medications/treatment.  Patient educated on proper medication use.  Patient educated on risk factor modification.  Please bring any lab results from other providers/physicians to your next appointment.    Please bring all medicines, vitamins, and herbal supplements with you when you come to the office.    Prescriptions will not be filled unless you are compliant with your follow up appointments or have a follow up appointment scheduled as per instruction of your physician. Refills should be requested at the time of your visit.    Follow up in one year    I, SABRINA VICENTE RN, AM SCRIBING FOR AND IN THE PRESENCE OF DR. REID FONTENOT M.D., FACC

## 2023-12-07 DIAGNOSIS — I63.9 CEREBROVASCULAR ACCIDENT (CVA), UNSPECIFIED MECHANISM (MULTI): ICD-10-CM

## 2023-12-07 DIAGNOSIS — J44.9 COPD, MILD (MULTI): Primary | ICD-10-CM

## 2023-12-08 ENCOUNTER — PATIENT OUTREACH (OUTPATIENT)
Dept: CARE COORDINATION | Facility: CLINIC | Age: 53
End: 2023-12-08
Payer: COMMERCIAL

## 2023-12-08 NOTE — PROGRESS NOTES
Unable to reach patient for call back after patient's follow up appointment with PCP.   KARSONM with call back number for patient to call if needed   If no voicemail available call attempts x 2 were made to contact the patient to assist with any questions or concerns patient may have.

## 2023-12-11 ENCOUNTER — APPOINTMENT (OUTPATIENT)
Dept: ORTHOPEDIC SURGERY | Facility: CLINIC | Age: 53
End: 2023-12-11
Payer: COMMERCIAL

## 2023-12-12 DIAGNOSIS — E78.5 HYPERLIPIDEMIA, UNSPECIFIED HYPERLIPIDEMIA TYPE: ICD-10-CM

## 2023-12-12 DIAGNOSIS — F41.8 DEPRESSION WITH ANXIETY: ICD-10-CM

## 2023-12-12 RX ORDER — ATORVASTATIN CALCIUM 80 MG/1
80 TABLET, FILM COATED ORAL NIGHTLY
Qty: 90 TABLET | Refills: 0 | Status: SHIPPED | OUTPATIENT
Start: 2023-12-12 | End: 2024-03-06

## 2023-12-12 RX ORDER — TRAZODONE HYDROCHLORIDE 100 MG/1
100 TABLET ORAL NIGHTLY
Qty: 90 TABLET | Refills: 1 | Status: SHIPPED | OUTPATIENT
Start: 2023-12-12 | End: 2024-06-03

## 2023-12-13 RX ORDER — ATORVASTATIN CALCIUM 80 MG/1
80 TABLET, FILM COATED ORAL DAILY
Qty: 90 TABLET | Refills: 0 | Status: SHIPPED | OUTPATIENT
Start: 2023-12-13

## 2023-12-18 ENCOUNTER — OFFICE VISIT (OUTPATIENT)
Dept: PRIMARY CARE | Facility: CLINIC | Age: 53
End: 2023-12-18
Payer: COMMERCIAL

## 2023-12-18 VITALS
BODY MASS INDEX: 36.58 KG/M2 | HEART RATE: 72 BPM | SYSTOLIC BLOOD PRESSURE: 120 MMHG | DIASTOLIC BLOOD PRESSURE: 74 MMHG | TEMPERATURE: 98.2 F | WEIGHT: 200 LBS

## 2023-12-18 DIAGNOSIS — M62.838 MUSCLE SPASTICITY: ICD-10-CM

## 2023-12-18 DIAGNOSIS — K29.00 ACUTE GASTRITIS WITHOUT HEMORRHAGE, UNSPECIFIED GASTRITIS TYPE: ICD-10-CM

## 2023-12-18 DIAGNOSIS — R09.82 PND (POST-NASAL DRIP): ICD-10-CM

## 2023-12-18 DIAGNOSIS — Z93.1 S/P PERCUTANEOUS ENDOSCOPIC GASTROSTOMY (PEG) TUBE PLACEMENT (MULTI): Primary | ICD-10-CM

## 2023-12-18 DIAGNOSIS — F34.1 PERSISTENT DEPRESSIVE DISORDER: ICD-10-CM

## 2023-12-18 PROCEDURE — 99214 OFFICE O/P EST MOD 30 MIN: CPT | Performed by: INTERNAL MEDICINE

## 2023-12-18 PROCEDURE — 3078F DIAST BP <80 MM HG: CPT | Performed by: INTERNAL MEDICINE

## 2023-12-18 PROCEDURE — 3074F SYST BP LT 130 MM HG: CPT | Performed by: INTERNAL MEDICINE

## 2023-12-18 PROCEDURE — 1036F TOBACCO NON-USER: CPT | Performed by: INTERNAL MEDICINE

## 2023-12-18 PROCEDURE — 3008F BODY MASS INDEX DOCD: CPT | Performed by: INTERNAL MEDICINE

## 2023-12-18 RX ORDER — FLUTICASONE FUROATE 27.5 UG/1
1 SPRAY, METERED NASAL
Qty: 10 G | Refills: 3 | Status: SHIPPED | OUTPATIENT
Start: 2023-12-18 | End: 2023-12-28 | Stop reason: SDUPTHER

## 2023-12-18 ASSESSMENT — ENCOUNTER SYMPTOMS
RESPIRATORY NEGATIVE: 1
DIFFICULTY URINATING: 0
EYES NEGATIVE: 1
MUSCULOSKELETAL NEGATIVE: 1
TREMORS: 0
NUMBNESS: 0
PSYCHIATRIC NEGATIVE: 1
DIZZINESS: 0
ABDOMINAL DISTENTION: 1

## 2023-12-18 NOTE — PROGRESS NOTES
Subjective   Patient ID: Idalia Ramsey is a 53 y.o. female who presents for Hospital Follow-up (Pt here for hospital follow up).    HPI patient was in the hospital for pneumonia she does have a pericardial effusion which had slightly increased in size she was seen by cardiology and will be seen as an outpatient she is doing well she still has some runny nose.  She does need lift chair because of for lack of mobility due to her stroke in the past she needs assistance to be transferred from outside shower and to inside the shower    Review of Systems   Eyes: Negative.    Respiratory: Negative.          Recent pneumonia   Cardiovascular:  Negative for chest pain.   Gastrointestinal:  Positive for abdominal distention.        Acid reflux, PEG TUBE   Genitourinary:  Negative for difficulty urinating.   Musculoskeletal: Negative.    Skin: Negative.    Neurological:  Negative for dizziness, tremors and numbness.        Dysphasia   Psychiatric/Behavioral: Negative.         Objective   /74   Pulse 72   Temp 36.8 °C (98.2 °F) (Temporal)   Wt 90.7 kg (200 lb)   LMP 10/22/2023 (Approximate)   BMI 36.58 kg/m²     Physical Exam  Vitals reviewed.   Constitutional:       Appearance: Normal appearance.   Neurological:      Mental Status: She is alert.       Assessment/Plan   Problem List Items Addressed This Visit             ICD-10-CM    Muscle spasticity M62.838    Depression F32.A    S/P percutaneous endoscopic gastrostomy (PEG) tube placement (CMS/[facility]) - Primary Z93.1    PND (post-nasal drip) R09.82     Other Visit Diagnoses         Codes    Acute gastritis without hemorrhage, unspecified gastritis type     K29.00        Patient is scheduled fluxes not responding to as needed Pepcid we did not give her omeprazole because of possible interaction between omeprazole and citalopram she will take Pepcid on regular basis twice daily.  She is on citalopram for depression she will continue aspirin and statin for CAD  continue amlodipine for hypertension.

## 2023-12-20 ENCOUNTER — TELEPHONE (OUTPATIENT)
Dept: PRIMARY CARE | Facility: CLINIC | Age: 53
End: 2023-12-20
Payer: COMMERCIAL

## 2023-12-20 DIAGNOSIS — R09.82 PND (POST-NASAL DRIP): ICD-10-CM

## 2023-12-20 DIAGNOSIS — Z86.73 HISTORY OF CARDIOEMBOLIC CEREBROVASCULAR ACCIDENT (CVA): ICD-10-CM

## 2023-12-20 DIAGNOSIS — M62.838 MUSCLE SPASTICITY: ICD-10-CM

## 2023-12-20 DIAGNOSIS — J20.9 ACUTE BRONCHITIS, UNSPECIFIED ORGANISM: ICD-10-CM

## 2023-12-20 DIAGNOSIS — Z00.00 ROUTINE GENERAL MEDICAL EXAMINATION AT A HEALTH CARE FACILITY: ICD-10-CM

## 2023-12-20 DIAGNOSIS — I63.9 CEREBROVASCULAR ACCIDENT (CVA), UNSPECIFIED MECHANISM (MULTI): ICD-10-CM

## 2023-12-20 DIAGNOSIS — Z86.73 HISTORY OF COMPLETED STROKE: ICD-10-CM

## 2023-12-26 DIAGNOSIS — E55.9 VITAMIN D DEFICIENCY: ICD-10-CM

## 2023-12-26 RX ORDER — ERGOCALCIFEROL 1.25 MG/1
1 CAPSULE ORAL
Qty: 4 CAPSULE | Refills: 0 | Status: SHIPPED | OUTPATIENT
Start: 2023-12-26 | End: 2024-02-01

## 2023-12-28 RX ORDER — ALBUTEROL SULFATE 0.83 MG/ML
2.5 SOLUTION RESPIRATORY (INHALATION) 4 TIMES DAILY PRN
Qty: 30 ML | Refills: 2 | Status: SHIPPED | OUTPATIENT
Start: 2023-12-28 | End: 2024-04-10 | Stop reason: SDUPTHER

## 2023-12-28 RX ORDER — FOLIC ACID 1 MG/1
1 TABLET ORAL DAILY
Qty: 90 TABLET | Refills: 0 | Status: SHIPPED | OUTPATIENT
Start: 2023-12-28

## 2023-12-28 RX ORDER — FLUTICASONE FUROATE 27.5 UG/1
1 SPRAY, METERED NASAL
Qty: 10 G | Refills: 3 | Status: SHIPPED | OUTPATIENT
Start: 2023-12-28 | End: 2024-12-27

## 2024-01-09 DIAGNOSIS — K21.9 GASTROESOPHAGEAL REFLUX DISEASE, UNSPECIFIED WHETHER ESOPHAGITIS PRESENT: ICD-10-CM

## 2024-01-09 RX ORDER — FAMOTIDINE 20 MG/1
20 TABLET, FILM COATED ORAL DAILY
Qty: 90 TABLET | Refills: 1 | Status: SHIPPED | OUTPATIENT
Start: 2024-01-09 | End: 2024-02-27 | Stop reason: SDUPTHER

## 2024-01-10 DIAGNOSIS — M62.838 MUSCLE SPASM: ICD-10-CM

## 2024-01-10 RX ORDER — BACLOFEN 10 MG/1
10 TABLET ORAL 3 TIMES DAILY PRN
Qty: 270 TABLET | Refills: 2 | Status: SHIPPED | OUTPATIENT
Start: 2024-01-10

## 2024-02-01 DIAGNOSIS — E55.9 VITAMIN D DEFICIENCY: ICD-10-CM

## 2024-02-01 RX ORDER — ERGOCALCIFEROL 1.25 MG/1
1 CAPSULE ORAL
Qty: 4 CAPSULE | Refills: 1 | Status: SHIPPED | OUTPATIENT
Start: 2024-02-01

## 2024-02-21 DIAGNOSIS — Z86.73 HISTORY OF COMPLETED STROKE: ICD-10-CM

## 2024-02-23 ENCOUNTER — APPOINTMENT (OUTPATIENT)
Dept: PRIMARY CARE | Facility: CLINIC | Age: 54
End: 2024-02-23
Payer: COMMERCIAL

## 2024-02-27 ENCOUNTER — APPOINTMENT (OUTPATIENT)
Dept: NEUROLOGY | Facility: CLINIC | Age: 54
End: 2024-02-27
Payer: COMMERCIAL

## 2024-02-27 DIAGNOSIS — K21.9 GASTROESOPHAGEAL REFLUX DISEASE, UNSPECIFIED WHETHER ESOPHAGITIS PRESENT: ICD-10-CM

## 2024-02-27 RX ORDER — FAMOTIDINE 20 MG/1
20 TABLET, FILM COATED ORAL 2 TIMES DAILY
Qty: 180 TABLET | Refills: 1 | Status: SHIPPED | OUTPATIENT
Start: 2024-02-27 | End: 2024-06-04

## 2024-02-29 ENCOUNTER — APPOINTMENT (OUTPATIENT)
Dept: NEUROLOGY | Facility: CLINIC | Age: 54
End: 2024-02-29
Payer: COMMERCIAL

## 2024-03-03 DIAGNOSIS — F32.A DEPRESSION, UNSPECIFIED DEPRESSION TYPE: ICD-10-CM

## 2024-03-04 RX ORDER — CITALOPRAM 40 MG/1
TABLET, FILM COATED ORAL
Qty: 90 TABLET | Refills: 0 | Status: SHIPPED | OUTPATIENT
Start: 2024-03-04 | End: 2024-03-19

## 2024-03-06 DIAGNOSIS — E78.5 HYPERLIPIDEMIA, UNSPECIFIED HYPERLIPIDEMIA TYPE: ICD-10-CM

## 2024-03-06 RX ORDER — ATORVASTATIN CALCIUM 80 MG/1
80 TABLET, FILM COATED ORAL NIGHTLY
Qty: 90 TABLET | Refills: 1 | Status: SHIPPED | OUTPATIENT
Start: 2024-03-06

## 2024-03-14 ENCOUNTER — APPOINTMENT (OUTPATIENT)
Dept: PRIMARY CARE | Facility: CLINIC | Age: 54
End: 2024-03-14
Payer: COMMERCIAL

## 2024-03-15 DIAGNOSIS — I10 HYPERTENSION, UNSPECIFIED TYPE: ICD-10-CM

## 2024-03-15 RX ORDER — AMLODIPINE BESYLATE 10 MG/1
10 TABLET ORAL DAILY
Qty: 90 TABLET | Refills: 0 | Status: SHIPPED | OUTPATIENT
Start: 2024-03-15 | End: 2024-03-19

## 2024-03-17 DIAGNOSIS — I10 HYPERTENSION, UNSPECIFIED TYPE: ICD-10-CM

## 2024-03-17 DIAGNOSIS — F32.A DEPRESSION, UNSPECIFIED DEPRESSION TYPE: ICD-10-CM

## 2024-03-19 RX ORDER — AMLODIPINE BESYLATE 10 MG/1
10 TABLET ORAL DAILY
Qty: 90 TABLET | Refills: 0 | Status: SHIPPED | OUTPATIENT
Start: 2024-03-19

## 2024-03-19 RX ORDER — CITALOPRAM 40 MG/1
TABLET, FILM COATED ORAL
Qty: 90 TABLET | Refills: 0 | Status: SHIPPED | OUTPATIENT
Start: 2024-03-19

## 2024-03-27 ENCOUNTER — APPOINTMENT (OUTPATIENT)
Dept: PRIMARY CARE | Facility: CLINIC | Age: 54
End: 2024-03-27
Payer: COMMERCIAL

## 2024-04-10 DIAGNOSIS — J20.9 ACUTE BRONCHITIS, UNSPECIFIED ORGANISM: ICD-10-CM

## 2024-04-10 RX ORDER — ALBUTEROL SULFATE 0.83 MG/ML
2.5 SOLUTION RESPIRATORY (INHALATION) 4 TIMES DAILY PRN
Qty: 30 ML | Refills: 2 | Status: SHIPPED | OUTPATIENT
Start: 2024-04-10 | End: 2025-04-10

## 2024-04-15 DIAGNOSIS — Z86.73 HISTORY OF COMPLETED STROKE: ICD-10-CM

## 2024-04-15 DIAGNOSIS — M62.838 MUSCLE SPASTICITY: ICD-10-CM

## 2024-04-15 DIAGNOSIS — R27.8 COORDINATION ABNORMAL: ICD-10-CM

## 2024-04-15 DIAGNOSIS — I63.9 CEREBROVASCULAR ACCIDENT (CVA), UNSPECIFIED MECHANISM (MULTI): ICD-10-CM

## 2024-04-26 DIAGNOSIS — R35.0 FREQUENCY OF MICTURITION: Primary | ICD-10-CM

## 2024-04-26 DIAGNOSIS — N39.0 URINARY TRACT INFECTION, SITE NOT SPECIFIED: ICD-10-CM

## 2024-04-29 ENCOUNTER — LAB (OUTPATIENT)
Dept: LAB | Facility: LAB | Age: 54
End: 2024-04-29
Payer: COMMERCIAL

## 2024-04-29 DIAGNOSIS — N39.0 URINARY TRACT INFECTION, SITE NOT SPECIFIED: ICD-10-CM

## 2024-04-29 DIAGNOSIS — R35.0 FREQUENCY OF MICTURITION: ICD-10-CM

## 2024-04-29 LAB
APPEARANCE UR: CLEAR
BILIRUB UR STRIP.AUTO-MCNC: NEGATIVE MG/DL
COLOR UR: YELLOW
GLUCOSE UR STRIP.AUTO-MCNC: NEGATIVE MG/DL
KETONES UR STRIP.AUTO-MCNC: NEGATIVE MG/DL
LEUKOCYTE ESTERASE UR QL STRIP.AUTO: NEGATIVE
NITRITE UR QL STRIP.AUTO: NEGATIVE
PH UR STRIP.AUTO: 5 [PH]
PROT UR STRIP.AUTO-MCNC: NEGATIVE MG/DL
RBC # UR STRIP.AUTO: NEGATIVE /UL
SP GR UR STRIP.AUTO: 1.02
UROBILINOGEN UR STRIP.AUTO-MCNC: <2 MG/DL

## 2024-04-29 PROCEDURE — 87086 URINE CULTURE/COLONY COUNT: CPT

## 2024-04-29 PROCEDURE — 81003 URINALYSIS AUTO W/O SCOPE: CPT

## 2024-04-30 LAB — BACTERIA UR CULT: NORMAL

## 2024-05-02 DIAGNOSIS — J44.9 COPD, MILD (MULTI): Primary | ICD-10-CM

## 2024-05-03 RX ORDER — LORATADINE 10 MG/1
10 TABLET ORAL DAILY
Qty: 90 TABLET | Refills: 1 | Status: SHIPPED | OUTPATIENT
Start: 2024-05-03 | End: 2024-05-17 | Stop reason: SDUPTHER

## 2024-05-16 DIAGNOSIS — H50.00 ESOTROPIA: ICD-10-CM

## 2024-05-16 RX ORDER — NYSTATIN 100000 [USP'U]/G
POWDER TOPICAL 2 TIMES DAILY
Qty: 60 G | Refills: 2 | Status: SHIPPED | OUTPATIENT
Start: 2024-05-16

## 2024-05-17 DIAGNOSIS — J44.9 COPD, MILD (MULTI): ICD-10-CM

## 2024-05-17 DIAGNOSIS — M62.838 SPASM OF MUSCLE: ICD-10-CM

## 2024-05-17 RX ORDER — LORATADINE 10 MG/1
10 TABLET ORAL DAILY
Qty: 90 TABLET | Refills: 1 | Status: SHIPPED | OUTPATIENT
Start: 2024-05-17

## 2024-05-17 RX ORDER — GABAPENTIN 250 MG/5ML
SOLUTION ORAL
Qty: 470 ML | Refills: 1 | Status: SHIPPED | OUTPATIENT
Start: 2024-05-17

## 2024-06-02 DIAGNOSIS — J44.9 CHRONIC OBSTRUCTIVE PULMONARY DISEASE, UNSPECIFIED COPD TYPE (MULTI): ICD-10-CM

## 2024-06-02 DIAGNOSIS — K21.9 GASTROESOPHAGEAL REFLUX DISEASE, UNSPECIFIED WHETHER ESOPHAGITIS PRESENT: ICD-10-CM

## 2024-06-02 DIAGNOSIS — F41.8 DEPRESSION WITH ANXIETY: ICD-10-CM

## 2024-06-03 RX ORDER — TRAZODONE HYDROCHLORIDE 100 MG/1
100 TABLET ORAL NIGHTLY
Qty: 90 TABLET | Refills: 1 | Status: SHIPPED | OUTPATIENT
Start: 2024-06-03

## 2024-06-04 RX ORDER — FAMOTIDINE 20 MG/1
20 TABLET, FILM COATED ORAL DAILY
Qty: 14 TABLET | Refills: 0 | Status: SHIPPED | OUTPATIENT
Start: 2024-06-04

## 2024-06-04 RX ORDER — MONTELUKAST SODIUM 10 MG/1
10 TABLET ORAL NIGHTLY
Qty: 14 TABLET | Refills: 0 | Status: SHIPPED | OUTPATIENT
Start: 2024-06-04

## 2024-06-17 DIAGNOSIS — K21.9 GASTROESOPHAGEAL REFLUX DISEASE, UNSPECIFIED WHETHER ESOPHAGITIS PRESENT: ICD-10-CM

## 2024-06-17 DIAGNOSIS — J44.9 CHRONIC OBSTRUCTIVE PULMONARY DISEASE, UNSPECIFIED COPD TYPE (MULTI): ICD-10-CM

## 2024-06-17 DIAGNOSIS — F41.8 DEPRESSION WITH ANXIETY: ICD-10-CM

## 2024-06-17 DIAGNOSIS — I10 HYPERTENSION, UNSPECIFIED TYPE: ICD-10-CM

## 2024-06-18 ENCOUNTER — APPOINTMENT (OUTPATIENT)
Dept: PRIMARY CARE | Facility: CLINIC | Age: 54
End: 2024-06-18
Payer: COMMERCIAL

## 2024-06-18 DIAGNOSIS — G83.9: ICD-10-CM

## 2024-06-18 DIAGNOSIS — Z86.73 S/P STROKE DUE TO CEREBROVASCULAR DISEASE: ICD-10-CM

## 2024-06-18 DIAGNOSIS — J44.9 CHRONIC OBSTRUCTIVE PULMONARY DISEASE, UNSPECIFIED COPD TYPE (MULTI): ICD-10-CM

## 2024-06-18 DIAGNOSIS — E78.2 MIXED HYPERLIPIDEMIA: Primary | ICD-10-CM

## 2024-06-18 DIAGNOSIS — R53.83 FATIGUE, UNSPECIFIED TYPE: ICD-10-CM

## 2024-06-18 DIAGNOSIS — Z93.1 S/P PERCUTANEOUS ENDOSCOPIC GASTROSTOMY (PEG) TUBE PLACEMENT (MULTI): ICD-10-CM

## 2024-06-18 DIAGNOSIS — Z12.31 ENCOUNTER FOR SCREENING MAMMOGRAM FOR MALIGNANT NEOPLASM OF BREAST: ICD-10-CM

## 2024-06-18 PROCEDURE — 99213 OFFICE O/P EST LOW 20 MIN: CPT | Performed by: INTERNAL MEDICINE

## 2024-06-18 RX ORDER — TRAZODONE HYDROCHLORIDE 100 MG/1
100 TABLET ORAL NIGHTLY
Qty: 90 TABLET | Refills: 1 | Status: SHIPPED | OUTPATIENT
Start: 2024-06-18

## 2024-06-18 NOTE — PROGRESS NOTES
Subjective   Patient ID: Idalia Ramsey is a 53 y.o. female who presents for No chief complaint on file..    HPI   This is a virtual visit patient denies any having chest pain shortness of breath.  She has PEG tube after her stroke she also has quadriparesis  Review of Systems  As mentioned above she does have a PEG tube and history of stroke and hypertension COPD patient is a reformed smoker.  All other 12 review of system negative  Objective   There were no vitals taken for this visit.    Physical Exam  Vitals reviewed.   Constitutional:       Appearance: Normal appearance.   Pulmonary:      Effort: Pulmonary effort is normal.   Neurological:      Mental Status: She is alert.         Assessment/Plan   Problem List Items Addressed This Visit             ICD-10-CM    Hyperlipidemia - Primary E78.5    Relevant Orders    Cholesterol, LDL Direct    Comprehensive Metabolic Panel    Lipid Panel    S/P percutaneous endoscopic gastrostomy (PEG) tube placement (Multi) Z93.1    COPD (chronic obstructive pulmonary disease) (Multi) J44.9    Partial bilateral paralysis (Multi) G83.9    S/P stroke due to cerebrovascular disease Z86.73     Other Visit Diagnoses         Codes    Encounter for screening mammogram for malignant neoplasm of breast     Z12.31    Relevant Orders    BI mammo bilateral screening tomosynthesis    Fatigue, unspecified type     R53.83    Relevant Orders    CBC and Auto Differential    TSH with reflex to Free T4 if abnormal          GI referral for replacement of PEG tube ordered blood work will be ordered she is clinically stable.  Medications reviewed COPD stable..  Patient will continue statin for hyperlipidemia amlodipine for hypertension aspirin with history of stroke.

## 2024-06-19 DIAGNOSIS — N39.0 URINARY TRACT INFECTION WITHOUT HEMATURIA, SITE UNSPECIFIED: ICD-10-CM

## 2024-06-19 PROBLEM — Z86.73 S/P STROKE DUE TO CEREBROVASCULAR DISEASE: Status: ACTIVE | Noted: 2024-06-19

## 2024-06-19 PROBLEM — G83.9: Status: ACTIVE | Noted: 2024-06-19

## 2024-06-20 RX ORDER — FAMOTIDINE 20 MG/1
20 TABLET, FILM COATED ORAL DAILY
Qty: 90 TABLET | Refills: 1 | Status: SHIPPED | OUTPATIENT
Start: 2024-06-20

## 2024-06-20 RX ORDER — MONTELUKAST SODIUM 10 MG/1
10 TABLET ORAL NIGHTLY
Qty: 90 TABLET | Refills: 1 | Status: SHIPPED | OUTPATIENT
Start: 2024-06-20

## 2024-06-24 ENCOUNTER — APPOINTMENT (OUTPATIENT)
Dept: NEUROLOGY | Facility: CLINIC | Age: 54
End: 2024-06-24
Payer: COMMERCIAL

## 2024-06-24 DIAGNOSIS — R13.10 DYSPHAGIA, UNSPECIFIED TYPE: ICD-10-CM

## 2024-06-24 DIAGNOSIS — I77.79: ICD-10-CM

## 2024-06-24 DIAGNOSIS — Z86.73 HISTORY OF STROKE: ICD-10-CM

## 2024-06-24 DIAGNOSIS — G82.50 SPASTIC QUADRIPARESIS (MULTI): Primary | ICD-10-CM

## 2024-06-24 DIAGNOSIS — M62.838 MUSCLE SPASM: ICD-10-CM

## 2024-06-24 PROCEDURE — 99203 OFFICE O/P NEW LOW 30 MIN: CPT | Performed by: PSYCHIATRY & NEUROLOGY

## 2024-06-24 PROCEDURE — 1036F TOBACCO NON-USER: CPT | Performed by: PSYCHIATRY & NEUROLOGY

## 2024-06-24 RX ORDER — CHOLECALCIFEROL (VITAMIN D3) 50 MCG
1 TABLET ORAL DAILY
COMMUNITY

## 2024-06-24 RX ORDER — MINERAL OIL
1 ENEMA (ML) RECTAL DAILY
COMMUNITY

## 2024-06-24 RX ORDER — BISACODYL 10 MG/1
SUPPOSITORY RECTAL
COMMUNITY

## 2024-06-24 RX ORDER — BACLOFEN 10 MG/1
TABLET ORAL
Qty: 360 TABLET | Refills: 2 | Status: SHIPPED | OUTPATIENT
Start: 2024-06-24

## 2024-06-24 ASSESSMENT — LIFESTYLE VARIABLES
SKIP TO QUESTIONS 9-10: 1
HOW OFTEN DO YOU HAVE SIX OR MORE DRINKS ON ONE OCCASION: NEVER
HOW MANY STANDARD DRINKS CONTAINING ALCOHOL DO YOU HAVE ON A TYPICAL DAY: PATIENT DOES NOT DRINK
HOW OFTEN DO YOU HAVE A DRINK CONTAINING ALCOHOL: NEVER
AUDIT-C TOTAL SCORE: 0

## 2024-06-24 ASSESSMENT — PATIENT HEALTH QUESTIONNAIRE - PHQ9
2. FEELING DOWN, DEPRESSED OR HOPELESS: SEVERAL DAYS
SUM OF ALL RESPONSES TO PHQ9 QUESTIONS 1 & 2: 1
1. LITTLE INTEREST OR PLEASURE IN DOING THINGS: NOT AT ALL

## 2024-06-24 NOTE — PROGRESS NOTES
An interactive audio and video telecommunication system which permits real time communications between the patient (at the originating site) and provider (at the distant site) was utilized to provide this telehealth service.      Verbal consent was requested and obtained from the patient on 2024    Consulting Physician: Dr. Robert    Chief Complaint: Stroke    History Of Present Illness  Idalia Ramsey is a 53 y.o. female presenting with stroke.    The patient had a stroke in 2019.  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  She was quadraplegic and could not move her face for 4-6 months.  Over time, she did regain movement.  She continues ot have difficulty speaking.  She is wheelchair bound.  She can eat pureed food and thickened liquids.  She uses the PEG tube for water.  When she does do PT, there is an improvement in her strength.  With therapy, she can stand up.  However, once the therapy stops, she does decline.      She was told that the cause of her stroke was an arterial dissection. There was no inciting event.    She is currently on ASA.    She is on Baclofen for spasticity.       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 Her smoking use included cigarettes. She started smoking about 49 years ago. She has a 22  pack-year smoking history. She has never used smokeless tobacco. She reports that she does not currently use alcohol. She reports that she does not use drugs.     Allergies  Levofloxacin, Lisinopril, Sulfa (sulfonamide antibiotics), Sulfamethoxazole-trimethoprim, and Ultram [tramadol]    Medications    Current Outpatient Medications:     albuterol 2.5 mg /3 mL (0.083 %) nebulizer solution, Take 3 mL (2.5 mg) by nebulization 4 times a day as needed for wheezing or shortness of breath., Disp: 30 mL, Rfl: 2    amLODIPine (Norvasc) 10 mg tablet, Take 1 tablet by mouth once daily, Disp: 90 tablet, Rfl: 0    aspirin 81 mg EC tablet, Take 1 tablet (81 mg) by mouth every other day., Disp: , Rfl:     atorvastatin (Lipitor) 80 mg tablet, take 1 tablet by mouth at bedtime, Disp: 90 tablet, Rfl: 1    baclofen (Lioresal) 10 mg tablet, Take 1 tablet (10 mg) by mouth 3 times a day as needed for muscle spasms., Disp: 270 tablet, Rfl: 2    bisacodyl (Dulcolax) 10 mg suppository, INSERT 1 SUPPOSITORY RECTALLY AT BEDTIME AS DIRECTED FOR CONSTIPATION, Disp: , Rfl:     cholecalciferol (Vitamin D-3) 50 MCG (2000 UT) tablet, Take 1 tablet (2,000 Units) by mouth once daily., Disp: , Rfl:     citalopram (CeleXA) 40 mg tablet, Take 1 tablet by mouth once daily, Disp: 90 tablet, Rfl: 0    docusate sodium (Colace) 50 mg/5 mL oral liquid, Take 10 mL (100 mg) by mouth once daily., Disp: , Rfl:     ergocalciferol (Vitamin D-2) 1.25 MG (56724 UT) capsule, Take 1 capsule by mouth once a week, Disp: 4 capsule, Rfl: 1    famotidine (Pepcid) 20 mg tablet, Take 1 tablet (20 mg) by mouth once daily., Disp: 90 tablet, Rfl: 1    fexofenadine (Allegra) 180 mg tablet, Take 1 tablet (180 mg) by mouth once daily., Disp: , Rfl:     fluticasone (Flonase Sensimist) 27.5 mcg/actuation nasal spray, Administer 1 spray into each nostril once daily., Disp: 10 g, Rfl: 3    gabapentin 250 mg/5 mL solution, TAKE 5 ML BY MOUTH  THREE TIMES DAILY, Disp: 470 mL, Rfl: 1     "guaiFENesin (Robitussin) 100 mg/5 mL syrup, Take 10 mL by mouth 2 times a day., Disp: , Rfl:     ipratropium-albuteroL (Duo-Neb) 0.5-2.5 mg/3 mL nebulizer solution, Take 3 mL by nebulization every 4 hours if needed for wheezing or shortness of breath., Disp: 180 mL, Rfl: 0    loratadine (Claritin) 10 mg tablet, Take 1 tablet (10 mg) by mouth once daily., Disp: 90 tablet, Rfl: 1    montelukast (Singulair) 10 mg tablet, TAKE 1 TABLET BY MOUTH ONCE DAILY AT BEDTIME, Disp: 90 tablet, Rfl: 1    nebulizer and compressor device, Use 4 times a day as needed with albuterol 2.5mg/3ml solution, Disp: 1 each, Rfl: 0    nystatin (Mycostatin) 100,000 unit/gram powder, Apply topically 2 times a day. Morning and afternoon, Disp: 60 g, Rfl: 2    sennosides (SENNA) 8.6 mg capsule, 1 capsule (8.6 mg) 2 times a day., Disp: , Rfl:     traZODone (Desyrel) 100 mg tablet, take 1 tablet by mouth at bedtime, Disp: 90 tablet, Rfl: 1    lidocaine (Lidoderm) 5 % patch, Place 1 patch over 12 hours on the skin once daily. Remove & discard patch within 12 hours or as directed by MD., Disp: 30 patch, Rfl: 0      Last Recorded Vitals   There were no vitals taken for this visit.    Objective:    Gen: NAD  Neuro:  --HIF: severe dysarthria, able to follow commands  --CN:  nystagmus noted with horizontal gaze, right facial droop,   --Motor: quadraparesis (right>left)    Relevant Results  Lab Results   Component Value Date    WBC 11.0 11/21/2023    HGB 13.7 11/21/2023    HCT 42.3 11/21/2023    MCV 87 11/21/2023     11/21/2023       Lab Results   Component Value Date    GLUCOSE 91 11/21/2023    CALCIUM 9.6 11/21/2023     11/21/2023    K 3.7 11/21/2023    CO2 29 11/21/2023     11/21/2023    BUN 15 11/21/2023    CREATININE 0.63 11/21/2023       No results found for: \"HGBA1C\"    Lab Results   Component Value Date    CHOL 225 (H) 08/23/2023    CHOL 206 (H) 01/04/2023     Lab Results   Component Value Date    HDL 52.6 08/23/2023    HDL 51.5 " "01/04/2023     No results found for: \"LDLCALC\"  Lab Results   Component Value Date    TRIG 63 08/23/2023    TRIG 58 01/04/2023     No components found for: \"CHOLHDL\"       Assessment:  History of Stroke in 2019, secondary to cervical arterial dissection  Spastic Quadraparesis  - continue ASA  - PT/ST  - increase Baclofen to 20-10-10 mg    Follow up in 6 months        Travis Cleaning MD  [facility]  Department of Neurology      A copy of this note was sent to the referring provider.    "

## 2024-06-28 ENCOUNTER — TELEPHONE (OUTPATIENT)
Dept: PRIMARY CARE | Facility: CLINIC | Age: 54
End: 2024-06-28
Payer: COMMERCIAL

## 2024-06-28 RX ORDER — FAMOTIDINE 20 MG/1
20 TABLET, FILM COATED ORAL DAILY
Qty: 90 TABLET | Refills: 0 | Status: SHIPPED | OUTPATIENT
Start: 2024-06-28

## 2024-06-28 RX ORDER — AMLODIPINE BESYLATE 10 MG/1
10 TABLET ORAL DAILY
Qty: 90 TABLET | Refills: 1 | Status: SHIPPED | OUTPATIENT
Start: 2024-06-28

## 2024-06-28 RX ORDER — MONTELUKAST SODIUM 10 MG/1
10 TABLET ORAL NIGHTLY
Qty: 90 TABLET | Refills: 0 | Status: SHIPPED | OUTPATIENT
Start: 2024-06-28

## 2024-07-01 DIAGNOSIS — F32.A DEPRESSION, UNSPECIFIED DEPRESSION TYPE: ICD-10-CM

## 2024-07-02 RX ORDER — CITALOPRAM 40 MG/1
40 TABLET, FILM COATED ORAL DAILY
Qty: 90 TABLET | Refills: 1 | Status: SHIPPED | OUTPATIENT
Start: 2024-07-02

## 2024-07-03 ENCOUNTER — LAB (OUTPATIENT)
Dept: LAB | Facility: LAB | Age: 54
End: 2024-07-03
Payer: COMMERCIAL

## 2024-07-03 DIAGNOSIS — N39.0 URINARY TRACT INFECTION WITHOUT HEMATURIA, SITE UNSPECIFIED: ICD-10-CM

## 2024-07-03 LAB
APPEARANCE UR: CLEAR
BILIRUB UR STRIP.AUTO-MCNC: NEGATIVE MG/DL
COLOR UR: NORMAL
GLUCOSE UR STRIP.AUTO-MCNC: NORMAL MG/DL
KETONES UR STRIP.AUTO-MCNC: NEGATIVE MG/DL
LEUKOCYTE ESTERASE UR QL STRIP.AUTO: NEGATIVE
NITRITE UR QL STRIP.AUTO: NEGATIVE
PH UR STRIP.AUTO: 6.5 [PH]
PROT UR STRIP.AUTO-MCNC: NEGATIVE MG/DL
RBC # UR STRIP.AUTO: NEGATIVE /UL
SP GR UR STRIP.AUTO: 1.01
UROBILINOGEN UR STRIP.AUTO-MCNC: NORMAL MG/DL

## 2024-07-03 PROCEDURE — 81003 URINALYSIS AUTO W/O SCOPE: CPT

## 2024-07-21 DIAGNOSIS — M62.838 SPASM OF MUSCLE: ICD-10-CM

## 2024-07-22 RX ORDER — GABAPENTIN 250 MG/5ML
SOLUTION ORAL
Qty: 470 ML | Refills: 1 | Status: SHIPPED | OUTPATIENT
Start: 2024-07-22

## 2024-07-29 DIAGNOSIS — J20.9 ACUTE BRONCHITIS, UNSPECIFIED ORGANISM: ICD-10-CM

## 2024-07-29 RX ORDER — ALBUTEROL SULFATE 0.83 MG/ML
2.5 SOLUTION RESPIRATORY (INHALATION) 4 TIMES DAILY PRN
Qty: 30 ML | Refills: 2 | Status: SHIPPED | OUTPATIENT
Start: 2024-07-29 | End: 2025-07-29

## 2024-08-20 ENCOUNTER — LAB (OUTPATIENT)
Dept: LAB | Facility: LAB | Age: 54
End: 2024-08-20
Payer: COMMERCIAL

## 2024-08-20 ENCOUNTER — APPOINTMENT (OUTPATIENT)
Dept: PRIMARY CARE | Facility: CLINIC | Age: 54
End: 2024-08-20
Payer: COMMERCIAL

## 2024-08-20 VITALS
BODY MASS INDEX: 36.8 KG/M2 | HEART RATE: 76 BPM | DIASTOLIC BLOOD PRESSURE: 74 MMHG | TEMPERATURE: 97.9 F | WEIGHT: 200 LBS | SYSTOLIC BLOOD PRESSURE: 126 MMHG | HEIGHT: 62 IN

## 2024-08-20 DIAGNOSIS — R09.82 PND (POST-NASAL DRIP): ICD-10-CM

## 2024-08-20 DIAGNOSIS — I10 HYPERTENSION, UNSPECIFIED TYPE: ICD-10-CM

## 2024-08-20 DIAGNOSIS — J22 LRTI (LOWER RESPIRATORY TRACT INFECTION): Primary | ICD-10-CM

## 2024-08-20 DIAGNOSIS — R53.83 FATIGUE, UNSPECIFIED TYPE: ICD-10-CM

## 2024-08-20 DIAGNOSIS — E78.2 MIXED HYPERLIPIDEMIA: ICD-10-CM

## 2024-08-20 LAB
ALBUMIN SERPL BCP-MCNC: 4.2 G/DL (ref 3.4–5)
ALP SERPL-CCNC: 102 U/L (ref 33–110)
ALT SERPL W P-5'-P-CCNC: 17 U/L (ref 7–45)
ANION GAP SERPL CALC-SCNC: 13 MMOL/L (ref 10–20)
AST SERPL W P-5'-P-CCNC: 16 U/L (ref 9–39)
BILIRUB SERPL-MCNC: 0.7 MG/DL (ref 0–1.2)
BUN SERPL-MCNC: 9 MG/DL (ref 6–23)
CALCIUM SERPL-MCNC: 9.5 MG/DL (ref 8.6–10.3)
CHLORIDE SERPL-SCNC: 104 MMOL/L (ref 98–107)
CHOLEST SERPL-MCNC: 231 MG/DL (ref 0–199)
CHOLESTEROL/HDL RATIO: 4.6
CO2 SERPL-SCNC: 26 MMOL/L (ref 21–32)
CREAT SERPL-MCNC: 0.56 MG/DL (ref 0.5–1.05)
EGFRCR SERPLBLD CKD-EPI 2021: >90 ML/MIN/1.73M*2
GLUCOSE SERPL-MCNC: 85 MG/DL (ref 74–99)
HDLC SERPL-MCNC: 50 MG/DL
LDLC SERPL CALC-MCNC: 166 MG/DL
NON HDL CHOLESTEROL: 181 MG/DL (ref 0–149)
POTASSIUM SERPL-SCNC: 4.4 MMOL/L (ref 3.5–5.3)
PROT SERPL-MCNC: 7.1 G/DL (ref 6.4–8.2)
SODIUM SERPL-SCNC: 139 MMOL/L (ref 136–145)
TRIGL SERPL-MCNC: 74 MG/DL (ref 0–149)
TSH SERPL-ACNC: 1.79 MIU/L (ref 0.44–3.98)
VLDL: 15 MG/DL (ref 0–40)

## 2024-08-20 PROCEDURE — 80053 COMPREHEN METABOLIC PANEL: CPT

## 2024-08-20 PROCEDURE — 36415 COLL VENOUS BLD VENIPUNCTURE: CPT

## 2024-08-20 PROCEDURE — 3008F BODY MASS INDEX DOCD: CPT | Performed by: INTERNAL MEDICINE

## 2024-08-20 PROCEDURE — 80061 LIPID PANEL: CPT

## 2024-08-20 PROCEDURE — 3074F SYST BP LT 130 MM HG: CPT | Performed by: INTERNAL MEDICINE

## 2024-08-20 PROCEDURE — 84443 ASSAY THYROID STIM HORMONE: CPT

## 2024-08-20 PROCEDURE — 1036F TOBACCO NON-USER: CPT | Performed by: INTERNAL MEDICINE

## 2024-08-20 PROCEDURE — 99214 OFFICE O/P EST MOD 30 MIN: CPT | Performed by: INTERNAL MEDICINE

## 2024-08-20 PROCEDURE — 83721 ASSAY OF BLOOD LIPOPROTEIN: CPT

## 2024-08-20 PROCEDURE — 3078F DIAST BP <80 MM HG: CPT | Performed by: INTERNAL MEDICINE

## 2024-08-20 RX ORDER — FLUTICASONE FUROATE 27.5 UG/1
1 SPRAY, METERED NASAL
Qty: 10 G | Refills: 3 | Status: SHIPPED | OUTPATIENT
Start: 2024-08-20 | End: 2025-08-20

## 2024-08-20 ASSESSMENT — ENCOUNTER SYMPTOMS
GASTROINTESTINAL NEGATIVE: 1
PSYCHIATRIC NEGATIVE: 1
COUGH: 1
CARDIOVASCULAR NEGATIVE: 1
ENDOCRINE NEGATIVE: 1
HEMATOLOGIC/LYMPHATIC NEGATIVE: 1
WHEEZING: 0
CONSTITUTIONAL NEGATIVE: 1

## 2024-08-20 ASSESSMENT — PATIENT HEALTH QUESTIONNAIRE - PHQ9
SUM OF ALL RESPONSES TO PHQ9 QUESTIONS 1 & 2: 0
1. LITTLE INTEREST OR PLEASURE IN DOING THINGS: NOT AT ALL
2. FEELING DOWN, DEPRESSED OR HOPELESS: NOT AT ALL

## 2024-08-20 NOTE — PROGRESS NOTES
"Subjective   Patient ID: Idalia Ramsey is a 53 y.o. female who presents for respite care and URI (Pt here for Evaluation tor Respite care for [facility]  [facility]. /Pt also has URI x2 weeks cough green phlegm sinus,No fever  No n/v/d/Nebulizer treatments  claritin/zyrtec/Assesment for skilled care, pureed diet, phil transfer/lift, ).    URI   Associated symptoms include coughing. Pertinent negatives include no wheezing.   Family is trying to get her to a nursing home for respite care.  She does have a history of stroke and hemiparesis she is wheelchair-bound she has a PEG tube for feeding.  She had cough for last several days she has history of pneumonia we will repeat chest x-ray and based on the results may need to put her on    Review of Systems   Constitutional: Negative.    Respiratory:  Positive for cough. Negative for wheezing.    Cardiovascular: Negative.    Gastrointestinal: Negative.    Endocrine: Negative.    Genitourinary: Negative.    Neurological:         H/Ostroke   Hematological: Negative.    Psychiatric/Behavioral: Negative.     All other systems reviewed and are negative.      Objective   /74   Pulse 76   Temp 36.6 °C (97.9 °F)   Ht 1.575 m (5' 2\")   Wt 90.7 kg (200 lb)   BMI 36.58 kg/m²     Physical Exam  Vitals reviewed.   Constitutional:       Appearance: Normal appearance.   HENT:      Head: Atraumatic.   Cardiovascular:      Rate and Rhythm: Normal rate and regular rhythm.      Pulses: Normal pulses.   Pulmonary:      Effort: Pulmonary effort is normal.      Breath sounds: Normal breath sounds.   Musculoskeletal:      Right lower leg: No edema.      Left lower leg: No edema.   Neurological:      Mental Status: She is alert and oriented to person, place, and time.         Assessment/Plan   Problem List Items Addressed This Visit             ICD-10-CM    HTN (hypertension) I10    Relevant Orders    Comprehensive Metabolic Panel     Other Visit Diagnoses         Codes    " LRTI (lower respiratory tract infection)    -  Primary J22    Relevant Orders    XR chest 2 views    CBC and Auto Differential    C-Reactive Protein        Blood work ordered.  Chest x-ray ordered when patient is in respite care she is going to require the following   Pureediet.Kiley lift and 2 person transfer

## 2024-08-21 LAB — LDLC SERPL DIRECT ASSAY-MCNC: 172 MG/DL (ref 0–129)

## 2024-08-22 ENCOUNTER — LAB (OUTPATIENT)
Dept: LAB | Facility: LAB | Age: 54
End: 2024-08-22
Payer: COMMERCIAL

## 2024-08-22 DIAGNOSIS — J22 LRTI (LOWER RESPIRATORY TRACT INFECTION): ICD-10-CM

## 2024-08-22 LAB
BASOPHILS # BLD AUTO: 0.06 X10*3/UL (ref 0–0.1)
BASOPHILS NFR BLD AUTO: 0.8 %
EOSINOPHIL # BLD AUTO: 0.31 X10*3/UL (ref 0–0.7)
EOSINOPHIL NFR BLD AUTO: 4.2 %
ERYTHROCYTE [DISTWIDTH] IN BLOOD BY AUTOMATED COUNT: 15.3 % (ref 11.5–14.5)
HCT VFR BLD AUTO: 44.9 % (ref 36–46)
HGB BLD-MCNC: 13.9 G/DL (ref 12–16)
IMM GRANULOCYTES # BLD AUTO: 0.02 X10*3/UL (ref 0–0.7)
IMM GRANULOCYTES NFR BLD AUTO: 0.3 % (ref 0–0.9)
LYMPHOCYTES # BLD AUTO: 1.71 X10*3/UL (ref 1.2–4.8)
LYMPHOCYTES NFR BLD AUTO: 23 %
MCH RBC QN AUTO: 28.2 PG (ref 26–34)
MCHC RBC AUTO-ENTMCNC: 31 G/DL (ref 32–36)
MCV RBC AUTO: 91 FL (ref 80–100)
MONOCYTES # BLD AUTO: 0.35 X10*3/UL (ref 0.1–1)
MONOCYTES NFR BLD AUTO: 4.7 %
NEUTROPHILS # BLD AUTO: 4.99 X10*3/UL (ref 1.2–7.7)
NEUTROPHILS NFR BLD AUTO: 67 %
NRBC BLD-RTO: 0 /100 WBCS (ref 0–0)
PLATELET # BLD AUTO: 326 X10*3/UL (ref 150–450)
RBC # BLD AUTO: 4.93 X10*6/UL (ref 4–5.2)
WBC # BLD AUTO: 7.4 X10*3/UL (ref 4.4–11.3)

## 2024-08-22 PROCEDURE — 85025 COMPLETE CBC W/AUTO DIFF WBC: CPT

## 2024-08-22 PROCEDURE — 36415 COLL VENOUS BLD VENIPUNCTURE: CPT

## 2024-08-24 DIAGNOSIS — H50.00 ESOTROPIA: ICD-10-CM

## 2024-08-26 ENCOUNTER — HOSPITAL ENCOUNTER (OUTPATIENT)
Dept: RADIOLOGY | Facility: HOSPITAL | Age: 54
Discharge: HOME | End: 2024-08-26
Payer: MEDICARE

## 2024-08-26 DIAGNOSIS — J22 LRTI (LOWER RESPIRATORY TRACT INFECTION): ICD-10-CM

## 2024-08-26 PROCEDURE — 71046 X-RAY EXAM CHEST 2 VIEWS: CPT

## 2024-08-26 PROCEDURE — 71046 X-RAY EXAM CHEST 2 VIEWS: CPT | Performed by: RADIOLOGY

## 2024-08-26 RX ORDER — NYSTATIN 100000 [USP'U]/G
POWDER TOPICAL
Qty: 60 G | Refills: 0 | Status: SHIPPED | OUTPATIENT
Start: 2024-08-26

## 2024-08-29 ENCOUNTER — TELEPHONE (OUTPATIENT)
Dept: PRIMARY CARE | Facility: CLINIC | Age: 54
End: 2024-08-29
Payer: COMMERCIAL

## 2024-08-29 DIAGNOSIS — J15.3: Primary | ICD-10-CM

## 2024-08-29 DIAGNOSIS — J06.9 VIRAL UPPER RESPIRATORY TRACT INFECTION: ICD-10-CM

## 2024-08-29 DIAGNOSIS — F32.A DEPRESSION, UNSPECIFIED DEPRESSION TYPE: ICD-10-CM

## 2024-08-29 RX ORDER — CITALOPRAM 40 MG/1
40 TABLET, FILM COATED ORAL DAILY
Qty: 90 TABLET | Refills: 1 | Status: SHIPPED | OUTPATIENT
Start: 2024-08-29

## 2024-08-29 RX ORDER — AZITHROMYCIN 250 MG/1
TABLET, FILM COATED ORAL
Qty: 6 TABLET | Refills: 0 | Status: SHIPPED | OUTPATIENT
Start: 2024-08-29 | End: 2024-08-29 | Stop reason: WASHOUT

## 2024-08-29 RX ORDER — AZITHROMYCIN 500 MG/1
250 TABLET, FILM COATED ORAL DAILY
Qty: 3 TABLET | Refills: 0 | OUTPATIENT
Start: 2024-08-29 | End: 2024-09-03

## 2024-08-29 RX ORDER — CEFUROXIME AXETIL 500 MG/1
500 TABLET ORAL 2 TIMES DAILY
Qty: 14 TABLET | Refills: 0 | Status: SHIPPED | OUTPATIENT
Start: 2024-08-29 | End: 2024-09-05

## 2024-09-09 DIAGNOSIS — N39.0 URINARY TRACT INFECTION WITHOUT HEMATURIA, SITE UNSPECIFIED: ICD-10-CM

## 2024-09-17 ENCOUNTER — OFFICE VISIT (OUTPATIENT)
Dept: GERIATRIC MEDICINE | Age: 54
End: 2024-09-17

## 2024-09-17 DIAGNOSIS — K21.9 GASTROESOPHAGEAL REFLUX DISEASE WITHOUT ESOPHAGITIS: ICD-10-CM

## 2024-09-17 DIAGNOSIS — J44.9 CHRONIC OBSTRUCTIVE PULMONARY DISEASE, UNSPECIFIED COPD TYPE (HCC): Primary | ICD-10-CM

## 2024-09-17 LAB
BASOPHILS ABSOLUTE: 0.1 /ΜL
BASOPHILS RELATIVE PERCENT: 1 %
BUN BLDV-MCNC: 9 MG/DL
CALCIUM SERPL-MCNC: 8.7 MG/DL
CHLORIDE BLD-SCNC: 106 MMOL/L
CO2: 25 MMOL/L
CREAT SERPL-MCNC: 0.6 MG/DL
EGFR: 104
EOSINOPHILS ABSOLUTE: 0.3 /ΜL
EOSINOPHILS RELATIVE PERCENT: 4.8 %
GLUCOSE BLD-MCNC: 76 MG/DL
HCT VFR BLD CALC: 37.6 % (ref 36–46)
HEMOGLOBIN: 12.2 G/DL (ref 12–16)
LYMPHOCYTES ABSOLUTE: 2 /ΜL
LYMPHOCYTES RELATIVE PERCENT: 27.8 %
MCH RBC QN AUTO: 29 PG
MCHC RBC AUTO-ENTMCNC: 32.4 G/DL
MCV RBC AUTO: 89.3 FL
MONOCYTES ABSOLUTE: 0.4 /ΜL
MONOCYTES RELATIVE PERCENT: 6.7 %
NEUTROPHILS ABSOLUTE: 4.3 /ΜL
NEUTROPHILS RELATIVE PERCENT: 60.1 %
PLATELET # BLD: 227 K/ΜL
PMV BLD AUTO: 9 FL
POTASSIUM SERPL-SCNC: 3.9 MMOL/L
RBC # BLD: 4.21 10^6/ΜL
SODIUM BLD-SCNC: 142 MMOL/L
WBC # BLD: 7.1 10^3/ML

## 2024-09-18 ENCOUNTER — OFFICE VISIT (OUTPATIENT)
Dept: GERIATRIC MEDICINE | Age: 54
End: 2024-09-18
Payer: COMMERCIAL

## 2024-09-18 DIAGNOSIS — Z86.73 HISTORY OF CVA (CEREBROVASCULAR ACCIDENT): Primary | ICD-10-CM

## 2024-09-18 DIAGNOSIS — J44.9 CHRONIC OBSTRUCTIVE PULMONARY DISEASE, UNSPECIFIED COPD TYPE (HCC): ICD-10-CM

## 2024-09-18 PROCEDURE — 99308 SBSQ NF CARE LOW MDM 20: CPT | Performed by: NURSE PRACTITIONER

## 2024-09-19 PROBLEM — J44.9 CHRONIC OBSTRUCTIVE PULMONARY DISEASE, UNSPECIFIED (HCC): Status: ACTIVE | Noted: 2024-09-19

## 2024-09-19 PROBLEM — F33.40 RECURRENT MAJOR DEPRESSIVE DISORDER, IN REMISSION (HCC): Status: ACTIVE | Noted: 2024-09-19

## 2024-09-19 PROBLEM — K21.9 GASTROESOPHAGEAL REFLUX DISEASE WITHOUT ESOPHAGITIS: Status: ACTIVE | Noted: 2024-09-19

## 2024-09-19 PROBLEM — Z86.73 HISTORY OF CVA (CEREBROVASCULAR ACCIDENT): Status: ACTIVE | Noted: 2024-09-19

## 2024-09-23 ENCOUNTER — OFFICE VISIT (OUTPATIENT)
Dept: GERIATRIC MEDICINE | Age: 54
End: 2024-09-23
Payer: COMMERCIAL

## 2024-09-23 DIAGNOSIS — R35.0 URINARY FREQUENCY: Primary | ICD-10-CM

## 2024-09-23 PROCEDURE — 99308 SBSQ NF CARE LOW MDM 20: CPT | Performed by: NURSE PRACTITIONER

## 2024-09-24 ENCOUNTER — OFFICE VISIT (OUTPATIENT)
Dept: GERIATRIC MEDICINE | Age: 54
End: 2024-09-24
Payer: COMMERCIAL

## 2024-09-24 DIAGNOSIS — J44.9 CHRONIC OBSTRUCTIVE PULMONARY DISEASE, UNSPECIFIED COPD TYPE (HCC): Primary | ICD-10-CM

## 2024-09-24 DIAGNOSIS — K21.9 GASTROESOPHAGEAL REFLUX DISEASE WITHOUT ESOPHAGITIS: ICD-10-CM

## 2024-09-24 PROCEDURE — 99309 SBSQ NF CARE MODERATE MDM 30: CPT | Performed by: INTERNAL MEDICINE

## 2024-09-25 ASSESSMENT — ENCOUNTER SYMPTOMS
RESPIRATORY NEGATIVE: 1
GASTROINTESTINAL NEGATIVE: 1

## 2024-09-30 ENCOUNTER — PATIENT MESSAGE (OUTPATIENT)
Dept: NEUROLOGY | Facility: CLINIC | Age: 54
End: 2024-09-30
Payer: COMMERCIAL

## 2024-09-30 DIAGNOSIS — R09.82 PND (POST-NASAL DRIP): ICD-10-CM

## 2024-09-30 DIAGNOSIS — M62.838 MUSCLE SPASM: ICD-10-CM

## 2024-09-30 DIAGNOSIS — M62.838 SPASM OF MUSCLE: ICD-10-CM

## 2024-09-30 DIAGNOSIS — K21.9 GASTROESOPHAGEAL REFLUX DISEASE, UNSPECIFIED WHETHER ESOPHAGITIS PRESENT: ICD-10-CM

## 2024-09-30 DIAGNOSIS — F41.8 DEPRESSION WITH ANXIETY: ICD-10-CM

## 2024-09-30 DIAGNOSIS — H50.00 ESOTROPIA: ICD-10-CM

## 2024-09-30 DIAGNOSIS — J20.9 ACUTE BRONCHITIS, UNSPECIFIED ORGANISM: ICD-10-CM

## 2024-09-30 DIAGNOSIS — E78.5 HYPERLIPIDEMIA, UNSPECIFIED HYPERLIPIDEMIA TYPE: ICD-10-CM

## 2024-09-30 DIAGNOSIS — E55.9 VITAMIN D DEFICIENCY: ICD-10-CM

## 2024-09-30 DIAGNOSIS — F32.A DEPRESSION, UNSPECIFIED DEPRESSION TYPE: ICD-10-CM

## 2024-09-30 DIAGNOSIS — J44.9 COPD, MILD (MULTI): ICD-10-CM

## 2024-09-30 DIAGNOSIS — J44.9 CHRONIC OBSTRUCTIVE PULMONARY DISEASE, UNSPECIFIED COPD TYPE (MULTI): ICD-10-CM

## 2024-09-30 DIAGNOSIS — G82.50 SPASTIC QUADRIPARESIS (MULTI): ICD-10-CM

## 2024-09-30 RX ORDER — FLUTICASONE FUROATE 27.5 UG/1
1 SPRAY, METERED NASAL
Qty: 10 G | Refills: 3 | Status: SHIPPED | OUTPATIENT
Start: 2024-09-30 | End: 2025-09-30

## 2024-09-30 RX ORDER — LORATADINE 10 MG/1
10 TABLET ORAL DAILY
Qty: 90 TABLET | Refills: 1 | Status: SHIPPED | OUTPATIENT
Start: 2024-09-30

## 2024-09-30 RX ORDER — NYSTATIN 100000 [USP'U]/G
POWDER TOPICAL 2 TIMES DAILY
Qty: 60 G | Refills: 0 | Status: SHIPPED | OUTPATIENT
Start: 2024-09-30

## 2024-09-30 RX ORDER — CITALOPRAM 40 MG/1
40 TABLET, FILM COATED ORAL DAILY
Qty: 90 TABLET | Refills: 1 | Status: SHIPPED | OUTPATIENT
Start: 2024-09-30 | End: 2024-10-01

## 2024-09-30 RX ORDER — MONTELUKAST SODIUM 10 MG/1
10 TABLET ORAL NIGHTLY
Qty: 90 TABLET | Refills: 1 | Status: SHIPPED | OUTPATIENT
Start: 2024-09-30

## 2024-09-30 RX ORDER — ATORVASTATIN CALCIUM 80 MG/1
80 TABLET, FILM COATED ORAL NIGHTLY
Qty: 90 TABLET | Refills: 1 | Status: SHIPPED | OUTPATIENT
Start: 2024-09-30

## 2024-09-30 RX ORDER — GABAPENTIN 250 MG/5ML
SOLUTION ORAL
Qty: 470 ML | Refills: 1 | Status: SHIPPED | OUTPATIENT
Start: 2024-09-30

## 2024-09-30 RX ORDER — ALBUTEROL SULFATE 0.83 MG/ML
2.5 SOLUTION RESPIRATORY (INHALATION) 4 TIMES DAILY PRN
Qty: 30 ML | Refills: 2 | Status: SHIPPED | OUTPATIENT
Start: 2024-09-30 | End: 2025-09-30

## 2024-09-30 RX ORDER — TRAZODONE HYDROCHLORIDE 100 MG/1
100 TABLET ORAL NIGHTLY
Qty: 90 TABLET | Refills: 1 | Status: SHIPPED | OUTPATIENT
Start: 2024-09-30

## 2024-09-30 RX ORDER — FAMOTIDINE 20 MG/1
20 TABLET, FILM COATED ORAL DAILY
Qty: 90 TABLET | Refills: 1 | Status: SHIPPED | OUTPATIENT
Start: 2024-09-30

## 2024-09-30 RX ORDER — BACLOFEN 10 MG/1
TABLET ORAL
Qty: 360 TABLET | Refills: 2 | Status: SHIPPED | OUTPATIENT
Start: 2024-09-30

## 2024-09-30 NOTE — PROGRESS NOTES
by: DORIAN Pederson - CNP on 9/23/2024    Please note orders entered on site at facility after discussion with appropriate facility nursing/therapy/ / nutritional staff. Current longstanding medical problems and acute medical issues addressed with staff. Available data and data elements in on site paper chart reviewed and analyzed.  Current external consultant notes reviewed in on site chart. Ordered laboratory testing and imaging will be reviewed when available.    Please note this report is partially produced by using speech recognition hardware.  It may contain errors related to the system, including grammar, punctuation and spelling as well as words and phrases that may seem inaccurate.  For any questions or concerns feel free to contact me for clarification

## 2024-10-01 ENCOUNTER — LAB (OUTPATIENT)
Dept: LAB | Facility: LAB | Age: 54
End: 2024-10-01
Payer: COMMERCIAL

## 2024-10-01 DIAGNOSIS — N39.0 URINARY TRACT INFECTION WITHOUT HEMATURIA, SITE UNSPECIFIED: ICD-10-CM

## 2024-10-01 DIAGNOSIS — N76.0 ACUTE VAGINITIS: ICD-10-CM

## 2024-10-01 LAB
APPEARANCE UR: ABNORMAL
BILIRUB UR STRIP.AUTO-MCNC: NEGATIVE MG/DL
COLOR UR: YELLOW
GLUCOSE UR STRIP.AUTO-MCNC: NORMAL MG/DL
KETONES UR STRIP.AUTO-MCNC: NEGATIVE MG/DL
LEUKOCYTE ESTERASE UR QL STRIP.AUTO: NEGATIVE
NITRITE UR QL STRIP.AUTO: NEGATIVE
PH UR STRIP.AUTO: 6 [PH]
PROT UR STRIP.AUTO-MCNC: NEGATIVE MG/DL
RBC # UR STRIP.AUTO: NEGATIVE /UL
SP GR UR STRIP.AUTO: 1.02
UROBILINOGEN UR STRIP.AUTO-MCNC: ABNORMAL MG/DL

## 2024-10-01 PROCEDURE — 81003 URINALYSIS AUTO W/O SCOPE: CPT

## 2024-10-01 RX ORDER — FLUCONAZOLE 150 MG/1
150 TABLET ORAL ONCE
Qty: 1 TABLET | Refills: 0 | Status: SHIPPED | OUTPATIENT
Start: 2024-10-01 | End: 2024-10-01

## 2024-10-14 DIAGNOSIS — I10 HYPERTENSION, UNSPECIFIED TYPE: ICD-10-CM

## 2024-10-14 RX ORDER — AMLODIPINE BESYLATE 2.5 MG/1
2.5 TABLET ORAL DAILY
Qty: 30 TABLET | Refills: 5 | Status: SHIPPED | OUTPATIENT
Start: 2024-10-14 | End: 2025-04-12

## 2024-10-15 ENCOUNTER — OFFICE VISIT (OUTPATIENT)
Dept: GERIATRIC MEDICINE | Age: 54
End: 2024-10-15

## 2024-10-15 DIAGNOSIS — G83.9: ICD-10-CM

## 2024-10-15 DIAGNOSIS — J44.9 CHRONIC OBSTRUCTIVE PULMONARY DISEASE, UNSPECIFIED COPD TYPE (HCC): ICD-10-CM

## 2024-10-15 DIAGNOSIS — Z86.73 HISTORY OF CVA (CEREBROVASCULAR ACCIDENT): Primary | ICD-10-CM

## 2024-10-15 DIAGNOSIS — I69.398 OTHER SEQUELAE OF CEREBRAL INFARCTION: ICD-10-CM

## 2024-10-15 RX ORDER — ATORVASTATIN CALCIUM 80 MG/1
80 TABLET, FILM COATED ORAL NIGHTLY
COMMUNITY

## 2024-10-15 RX ORDER — CITALOPRAM HYDROBROMIDE 40 MG/1
40 TABLET ORAL NIGHTLY
COMMUNITY

## 2024-10-15 RX ORDER — NYSTATIN 100000 [USP'U]/G
POWDER TOPICAL DAILY
COMMUNITY

## 2024-10-15 RX ORDER — LORATADINE 10 MG/1
10 TABLET ORAL DAILY
COMMUNITY

## 2024-10-15 RX ORDER — GABAPENTIN 250 MG/5ML
5 SOLUTION ORAL NIGHTLY
COMMUNITY

## 2024-10-15 RX ORDER — TRAZODONE HYDROCHLORIDE 100 MG/1
100 TABLET ORAL NIGHTLY
COMMUNITY

## 2024-10-15 RX ORDER — MONTELUKAST SODIUM 10 MG/1
10 TABLET ORAL EVERY MORNING
COMMUNITY

## 2024-10-15 RX ORDER — FAMOTIDINE 20 MG/1
20 TABLET, FILM COATED ORAL NIGHTLY
COMMUNITY

## 2024-10-15 RX ORDER — FLUTICASONE PROPIONATE 50 MCG
1 SPRAY, SUSPENSION (ML) NASAL DAILY
COMMUNITY

## 2024-10-15 RX ORDER — BACLOFEN 10 MG/1
10 TABLET ORAL SEE ADMIN INSTRUCTIONS
COMMUNITY

## 2024-10-15 RX ORDER — SENNOSIDES A AND B 8.6 MG/1
2 TABLET, FILM COATED ORAL NIGHTLY
COMMUNITY

## 2024-10-15 RX ORDER — ASPIRIN 81 MG/1
81 TABLET, CHEWABLE ORAL DAILY
COMMUNITY

## 2024-10-15 RX ORDER — ALBUTEROL SULFATE 0.83 MG/ML
2.5 SOLUTION RESPIRATORY (INHALATION) 2 TIMES DAILY
COMMUNITY

## 2024-10-16 ENCOUNTER — OFFICE VISIT (OUTPATIENT)
Dept: GERIATRIC MEDICINE | Age: 54
End: 2024-10-16

## 2024-10-16 DIAGNOSIS — J44.9 CHRONIC OBSTRUCTIVE PULMONARY DISEASE, UNSPECIFIED COPD TYPE (HCC): Primary | ICD-10-CM

## 2024-10-17 ASSESSMENT — ENCOUNTER SYMPTOMS
GASTROINTESTINAL NEGATIVE: 1
RESPIRATORY NEGATIVE: 1

## 2024-10-17 NOTE — PROGRESS NOTES
Please note orders entered on site at facility after discussion with appropriate facility nursing/therapy/ / nutritional staff. Current longstanding medical problems and acute medical issues addressed with staff. Available data and data elements in on site paper chart reviewed and analyzed.  Current external consultant notes reviewed in on site chart. Ordered laboratory testing and imaging will be reviewed when available.   This patient's need for psychiatric medication has been reviewed. Will consider further adjustment and possible further evaluations by mental health services.

## 2024-10-17 NOTE — PROGRESS NOTES
[address] [place] [address]    10/15/2024    Frances Giron  is a 54 y.o. in the  being seen for    Chief Complaint   Patient presents with    Follow-up       Frances Giron is a 54-year-old female readmitted to [facility] for respite stay.Patient with significant past history of CVA with right-sided weakness, GERD, COPD, depression, frequent UTIs.   Patient able to answer yes or no questions.  She denies nausea vomiting diarrhea constipation.  No recent fever or fall.  No change in bowel or bladder habits.  Patient has no complaints of pain at this time.          No past medical history on file.  No past surgical history on file.  No family history on file.  Social History     Socioeconomic History    Marital status: Single     Spouse name: Not on file    Number of children: Not on file    Years of education: Not on file    Highest education level: Not on file   Occupational History    Not on file   Tobacco Use    Smoking status: Never     Passive exposure: Never    Smokeless tobacco: Never   Vaping Use    Vaping status: Never Used   Substance and Sexual Activity    Alcohol use: Not Currently    Drug use: Never    Sexual activity: Not on file   Other Topics Concern    Not on file   Social History Narrative    Not on file     Social Determinants of Health     Financial Resource Strain: Low Risk  (11/22/2023)    Received from [facility]    Overall Financial Resource Strain (CARDIA)     Difficulty of Paying Living Expenses: Not hard at all   Food Insecurity: No Food Insecurity (11/22/2023)    Received from [facility]    Hunger Vital Sign     Worried About Running Out of Food in the Last Year: Never true     Ran Out of Food in the Last Year: Never true   Transportation Needs: No Transportation Needs (11/22/2023)    Received from [facility]    PRAPARE - Transportation     Lack of Transportation (Medical): No

## 2024-10-21 ASSESSMENT — ENCOUNTER SYMPTOMS
COUGH: 1
SHORTNESS OF BREATH: 1
CONSTIPATION: 1
BACK PAIN: 1

## 2024-10-24 NOTE — PROGRESS NOTES
SUBJECTIVE:  54-year-old woman seen well visit for COPD GERD hyperlipidemia patient is on statin therapy patient medically stable this time.  No acute upper EXTR patient function stable this time acute lightheadedness emesis fevers or chills      ROS: Coughing and  The rest of the 14 point ROS negative    PHYSICAL EXAM: VSS per facility record  Pupils are reactive oral mucosa is dry chest with no crackles no wheezing cardiovascular showed a regular abdomen soft tender trace edema skin skin showed no rash    ASSESSMENT & PLAN:   Diagnosis Orders   1. Chronic obstructive pulmonary disease, unspecified COPD type (HCC)        2. Gastroesophageal reflux disease without esophagitis            Function stable this time.  No orthostasis continue brace.  Remains at this time.  Sher on per neuropathy better tolerating well          No past medical history on file.      No past surgical history on file.      No current outpatient medications on file prior to visit.     No current facility-administered medications on file prior to visit.         No family history on file.    Social History     Socioeconomic History    Marital status: Single     Spouse name: Not on file    Number of children: Not on file    Years of education: Not on file    Highest education level: Not on file   Occupational History    Not on file   Tobacco Use    Smoking status: Never     Passive exposure: Never    Smokeless tobacco: Never   Vaping Use    Vaping status: Never Used   Substance and Sexual Activity    Alcohol use: Not Currently    Drug use: Never    Sexual activity: Not on file   Other Topics Concern    Not on file   Social History Narrative    Not on file     Social Determinants of Health     Financial Resource Strain: Low Risk  (11/22/2023)    Received from [facility]    Overall Financial Resource Strain (CARDIA)     Difficulty of Paying Living Expenses: Not hard at all   Food Insecurity: No Food Insecurity (11/22/2023)

## 2024-11-14 NOTE — PROGRESS NOTES
Patient Name: Frances Giron  Date: 11/14/2024  YOB: 1970  Medical Record Number: [id number]              History of Present Illness:      Review of Systems    Review of Systems: All 14 review of systems negative other than as stated above    Social History     Tobacco Use    Smoking status: Never     Passive exposure: Never    Smokeless tobacco: Never   Vaping Use    Vaping status: Never Used   Substance Use Topics    Alcohol use: Not Currently    Drug use: Never         No past medical history on file.        No past surgical history on file.      Current Outpatient Medications on File Prior to Visit   Medication Sig Dispense Refill    albuterol (PROVENTIL) (2.5 MG/3ML) 0.083% nebulizer solution Take 3 mLs by nebulization 2 times daily Indications: Chronic Obstructive Lung Disease      aspirin 81 MG chewable tablet 1 tablet by Per G Tube route daily Indications: Prevention of Unwanted Clot in Veins      atorvastatin (LIPITOR) 80 MG tablet 1 tablet by Per G Tube route nightly Indications: High Amount of Fats in the Blood      baclofen (LIORESAL) 10 MG tablet 1 tablet by Per G Tube route See Admin Instructions Indications: Muscle Spasm Give 20mg every morning & 10 mg every evening.      citalopram (CELEXA) 40 MG tablet 1 tablet by Per G Tube route nightly Indications: Depression      famotidine (PEPCID) 20 MG tablet 1 tablet by Per G Tube route nightly Indications: Gastroesophageal Reflux Disease      fluticasone (FLONASE) 50 MCG/ACT nasal spray 1 spray by Each Nostril route daily Indications: Allergy      gabapentin (NEURONTIN) 250 MG/5ML solution 5 mLs by Per G Tube route nightly. Indications: Nerve Disease      loratadine (CLARITIN) 10 MG tablet 1 tablet by Per G Tube route daily Indications: Allergy      montelukast (SINGULAIR) 10 MG tablet 1 tablet by Per G Tube route every morning Indications: Allergy      nystatin (MYCOSTATIN) 202305 UNIT/GM powder Apply topically daily Indications: Skin

## 2024-11-21 RX ORDER — CITALOPRAM 40 MG/1
40 TABLET, FILM COATED ORAL DAILY
COMMUNITY
Start: 2024-11-20

## 2024-11-22 ENCOUNTER — OFFICE VISIT (OUTPATIENT)
Dept: GERIATRIC MEDICINE | Age: 54
End: 2024-11-22
Payer: COMMERCIAL

## 2024-11-22 DIAGNOSIS — Z86.73 HISTORY OF CVA (CEREBROVASCULAR ACCIDENT): ICD-10-CM

## 2024-11-22 DIAGNOSIS — R26.81 UNSTEADINESS: ICD-10-CM

## 2024-11-22 DIAGNOSIS — J44.9 CHRONIC OBSTRUCTIVE PULMONARY DISEASE, UNSPECIFIED COPD TYPE (HCC): Primary | ICD-10-CM

## 2024-11-22 DIAGNOSIS — R53.1 WEAKNESS: ICD-10-CM

## 2024-11-22 PROCEDURE — G8484 FLU IMMUNIZE NO ADMIN: HCPCS | Performed by: NURSE PRACTITIONER

## 2024-11-22 PROCEDURE — 99309 SBSQ NF CARE MODERATE MDM 30: CPT | Performed by: NURSE PRACTITIONER

## 2024-11-26 ASSESSMENT — ENCOUNTER SYMPTOMS
SHORTNESS OF BREATH: 1
CONSTIPATION: 1
BACK PAIN: 1

## 2024-11-27 ENCOUNTER — OFFICE VISIT (OUTPATIENT)
Dept: GERIATRIC MEDICINE | Age: 54
End: 2024-11-27

## 2024-11-27 DIAGNOSIS — R53.1 WEAKNESS: ICD-10-CM

## 2024-11-27 DIAGNOSIS — J44.9 CHRONIC OBSTRUCTIVE PULMONARY DISEASE, UNSPECIFIED COPD TYPE (HCC): Primary | ICD-10-CM

## 2024-11-27 DIAGNOSIS — E78.5 HYPERLIPIDEMIA, UNSPECIFIED HYPERLIPIDEMIA TYPE: ICD-10-CM

## 2024-11-27 ASSESSMENT — ENCOUNTER SYMPTOMS
RESPIRATORY NEGATIVE: 1
GASTROINTESTINAL NEGATIVE: 1

## 2024-11-27 NOTE — PROGRESS NOTES
[address] [place] [address]    11/22/2024    Frances Giron  is a 54 y.o. in the  being seen for    Chief Complaint   Patient presents with    Follow-up       Frances Giron is a 54-year-old female readmitted to [facility] for respite stay.Patient with significant past history of CVA with right-sided weakness, GERD, COPD, depression, frequent UTIs.   Patient resting in room at time of visit.  Patient able to answer yes or no questions.  She denies nausea vomiting diarrhea constipation.  No recent fever or fall.  No change in bowel or bladder habits.            No past medical history on file.  No past surgical history on file.  No family history on file.  Social History     Socioeconomic History    Marital status: Single     Spouse name: Not on file    Number of children: Not on file    Years of education: Not on file    Highest education level: Not on file   Occupational History    Not on file   Tobacco Use    Smoking status: Never     Passive exposure: Never    Smokeless tobacco: Never   Vaping Use    Vaping status: Never Used   Substance and Sexual Activity    Alcohol use: Not Currently    Drug use: Never    Sexual activity: Not on file   Other Topics Concern    Not on file   Social History Narrative    Not on file     Social Determinants of Health     Financial Resource Strain: Low Risk  (11/22/2023)    Received from [facility]    Overall Financial Resource Strain (CARDIA)     Difficulty of Paying Living Expenses: Not hard at all   Food Insecurity: No Food Insecurity (11/22/2023)    Received from [facility]    Hunger Vital Sign     Worried About Running Out of Food in the Last Year: Never true     Ran Out of Food in the Last Year: Never true   Transportation Needs: No Transportation Needs (11/22/2023)    Received from [facility]    PRAPARE - Transportation     Lack of Transportation (Medical): No

## 2024-12-10 ENCOUNTER — APPOINTMENT (OUTPATIENT)
Dept: CARDIOLOGY | Facility: CLINIC | Age: 54
End: 2024-12-10
Payer: COMMERCIAL

## 2024-12-24 ENCOUNTER — OFFICE VISIT (OUTPATIENT)
Dept: GERIATRIC MEDICINE | Age: 54
End: 2024-12-24

## 2024-12-24 DIAGNOSIS — K21.9 GASTROESOPHAGEAL REFLUX DISEASE WITHOUT ESOPHAGITIS: ICD-10-CM

## 2024-12-24 DIAGNOSIS — G62.9 NEUROPATHY: ICD-10-CM

## 2024-12-24 DIAGNOSIS — J44.9 CHRONIC OBSTRUCTIVE PULMONARY DISEASE, UNSPECIFIED COPD TYPE (HCC): Primary | ICD-10-CM

## 2024-12-27 ENCOUNTER — OFFICE VISIT (OUTPATIENT)
Dept: GERIATRIC MEDICINE | Age: 54
End: 2024-12-27
Payer: COMMERCIAL

## 2024-12-27 DIAGNOSIS — R53.1 WEAKNESS: ICD-10-CM

## 2024-12-27 DIAGNOSIS — K21.9 GASTROESOPHAGEAL REFLUX DISEASE WITHOUT ESOPHAGITIS: ICD-10-CM

## 2024-12-27 DIAGNOSIS — J44.9 CHRONIC OBSTRUCTIVE PULMONARY DISEASE, UNSPECIFIED COPD TYPE (HCC): Primary | ICD-10-CM

## 2024-12-27 PROCEDURE — 99309 SBSQ NF CARE MODERATE MDM 30: CPT | Performed by: INTERNAL MEDICINE

## 2024-12-27 PROCEDURE — G8484 FLU IMMUNIZE NO ADMIN: HCPCS | Performed by: INTERNAL MEDICINE

## 2024-12-27 NOTE — PROGRESS NOTES
loratadine (CLARITIN) 10 MG tablet 1 tablet by Per G Tube route daily Indications: Allergy      montelukast (SINGULAIR) 10 MG tablet 1 tablet by Per G Tube route every morning Indications: Allergy      nystatin (MYCOSTATIN) 417185 UNIT/GM powder Apply topically daily Indications: Skin Infection due to Candida Yeast Apply topically 4 times daily. Day shift to skin folds groin, abdomen under breasts      senna (SENOKOT) 8.6 MG tablet 2 tablets by Per G Tube route nightly Indications: Constipation      traZODone (DESYREL) 100 MG tablet 1 tablet by Per G Tube route nightly Indications: Trouble Sleeping       No current facility-administered medications on file prior to visit.         No family history on file.    Social History     Socioeconomic History    Marital status: Single     Spouse name: Not on file    Number of children: Not on file    Years of education: Not on file    Highest education level: Not on file   Occupational History    Not on file   Tobacco Use    Smoking status: Never     Passive exposure: Never    Smokeless tobacco: Never   Vaping Use    Vaping status: Never Used   Substance and Sexual Activity    Alcohol use: Not Currently    Drug use: Never    Sexual activity: Not on file   Other Topics Concern    Not on file   Social History Narrative    Not on file     Social Determinants of Health     Financial Resource Strain: Low Risk  (11/22/2023)    Received from [facility]    Overall Financial Resource Strain (CARDIA)     Difficulty of Paying Living Expenses: Not hard at all   Food Insecurity: No Food Insecurity (11/22/2023)    Received from [facility]    Hunger Vital Sign     Worried About Running Out of Food in the Last Year: Never true     Ran Out of Food in the Last Year: Never true   Transportation Needs: No Transportation Needs (11/22/2023)    Received from [facility]    PRAPARE - Transportation     Lack of Transportation

## 2025-01-03 ENCOUNTER — OFFICE VISIT (OUTPATIENT)
Dept: GERIATRIC MEDICINE | Age: 55
End: 2025-01-03

## 2025-01-03 DIAGNOSIS — J44.9 CHRONIC OBSTRUCTIVE PULMONARY DISEASE, UNSPECIFIED COPD TYPE (HCC): Primary | ICD-10-CM

## 2025-01-03 DIAGNOSIS — K21.9 GASTROESOPHAGEAL REFLUX DISEASE WITHOUT ESOPHAGITIS: ICD-10-CM

## 2025-01-07 ENCOUNTER — OFFICE VISIT (OUTPATIENT)
Dept: GERIATRIC MEDICINE | Age: 55
End: 2025-01-07
Payer: COMMERCIAL

## 2025-01-07 DIAGNOSIS — Z00.00 WELCOME TO MEDICARE PREVENTIVE VISIT: Primary | ICD-10-CM

## 2025-01-07 PROCEDURE — G0402 INITIAL PREVENTIVE EXAM: HCPCS | Performed by: NURSE PRACTITIONER

## 2025-01-07 PROCEDURE — 3017F COLORECTAL CA SCREEN DOC REV: CPT | Performed by: NURSE PRACTITIONER

## 2025-01-07 ASSESSMENT — LIFESTYLE VARIABLES
HOW MANY STANDARD DRINKS CONTAINING ALCOHOL DO YOU HAVE ON A TYPICAL DAY: PATIENT DOES NOT DRINK
HOW OFTEN DO YOU HAVE A DRINK CONTAINING ALCOHOL: NEVER

## 2025-01-07 ASSESSMENT — PATIENT HEALTH QUESTIONNAIRE - PHQ9
1. LITTLE INTEREST OR PLEASURE IN DOING THINGS: NOT AT ALL
SUM OF ALL RESPONSES TO PHQ9 QUESTIONS 1 & 2: 0
SUM OF ALL RESPONSES TO PHQ QUESTIONS 1-9: 0
2. FEELING DOWN, DEPRESSED OR HOPELESS: NOT AT ALL
SUM OF ALL RESPONSES TO PHQ QUESTIONS 1-9: 0

## 2025-01-07 NOTE — PROGRESS NOTES
Tube route daily Indications: Allergy      montelukast (SINGULAIR) 10 MG tablet 1 tablet by Per G Tube route every morning Indications: Allergy      nystatin (MYCOSTATIN) 487869 UNIT/GM powder Apply topically daily Indications: Skin Infection due to Candida Yeast Apply topically 4 times daily. Day shift to skin folds groin, abdomen under breasts      senna (SENOKOT) 8.6 MG tablet 2 tablets by Per G Tube route nightly Indications: Constipation      traZODone (DESYREL) 100 MG tablet 1 tablet by Per G Tube route nightly Indications: Trouble Sleeping       No current facility-administered medications on file prior to visit.         No family history on file.    Social History     Socioeconomic History    Marital status: Single     Spouse name: Not on file    Number of children: Not on file    Years of education: Not on file    Highest education level: Not on file   Occupational History    Not on file   Tobacco Use    Smoking status: Never     Passive exposure: Never    Smokeless tobacco: Never   Vaping Use    Vaping status: Never Used   Substance and Sexual Activity    Alcohol use: Not Currently    Drug use: Never    Sexual activity: Not on file   Other Topics Concern    Not on file   Social History Narrative    Not on file     Social Determinants of Health     Financial Resource Strain: Low Risk  (11/22/2023)    Received from [facility]    Overall Financial Resource Strain (CARDIA)     Difficulty of Paying Living Expenses: Not hard at all   Food Insecurity: No Food Insecurity (11/22/2023)    Received from [facility]    Hunger Vital Sign     Worried About Running Out of Food in the Last Year: Never true     Ran Out of Food in the Last Year: Never true   Transportation Needs: No Transportation Needs (11/22/2023)    Received from [facility]    PRAPARE - Transportation     Lack of Transportation (Medical): No     Lack of Transportation (Non-Medical): No

## 2025-01-07 NOTE — PROGRESS NOTES
Medicare Annual Wellness Visit    Frances Giron is here for Medicare AWV    Assessment & Plan   Welcome to Medicare preventive visit       No follow-ups on file.     Subjective       Patient's complete Health Risk Assessment and screening values have been reviewed and are found in Flowsheets. The following problems were reviewed today and where indicated follow up appointments were made and/or referrals ordered.    Positive Risk Factor Screenings with Interventions:              Inactivity:  On average, how many days per week do you engage in moderate to strenuous exercise (like a brisk walk)?: 0 days (!) Abnormal  On average, how many minutes do you engage in exercise at this level?: 0 min     Abnormal BMI (obese):  There is no height or weight on file to calculate BMI. (!) Abnormal  Interventions:  low carbohydrate diet        Dentist Screen:  Have you seen the dentist within the past year?: (!) No     ADL's:   Patient reports needing help with:  Select all that apply: (!) Dressing, Grooming, Bathing, Toileting, Walking/Balance  Select all that apply: (!) Laundry, Taking Medications, Food Preparation, Transportation, Banking/Finances, Housekeeping                  Objective   There were no vitals filed for this visit.   There is no height or weight on file to calculate BMI.                    Allergies   Allergen Reactions    Bactrim [Sulfamethoxazole-Trimethoprim]     Levofloxacin     Lisinopril     Sulfa Antibiotics     Tramadol      Prior to Visit Medications    Medication Sig Taking? Authorizing Provider   albuterol (PROVENTIL) (2.5 MG/3ML) 0.083% nebulizer solution Take 3 mLs by nebulization 2 times daily Indications: Chronic Obstructive Lung Disease  ProviderMalissa MD   aspirin 81 MG chewable tablet 1 tablet by Per G Tube route daily Indications: Prevention of Unwanted Clot in Veins  Provider, MD Malissa   atorvastatin (LIPITOR) 80 MG tablet 1 tablet by Per G Tube route nightly Indications:

## 2025-01-14 ENCOUNTER — OFFICE VISIT (OUTPATIENT)
Dept: GERIATRIC MEDICINE | Age: 55
End: 2025-01-14

## 2025-01-14 DIAGNOSIS — R30.0 DYSURIA: Primary | ICD-10-CM

## 2025-01-17 NOTE — PROGRESS NOTES
[address] [place] [address]    1/14/2025    Frances Giron  is a 54 y.o. in the  being seen for a f/u of   Chief Complaint   Patient presents with    Dysuria       Frances Giron is a 54-year-old female residing at [facility] for respite stay.  At time of visit patient resting in room.  Patient reports burning sensation with urination.  She denies frequency sensation with urination.  She denies frequency, urgency, flank pain or hematuria.  No recent fever.  No change in bowel habits.           No past medical history on file.  No past surgical history on file.  No family history on file.  Social History     Socioeconomic History    Marital status: Single     Spouse name: Not on file    Number of children: Not on file    Years of education: Not on file    Highest education level: Not on file   Occupational History    Not on file   Tobacco Use    Smoking status: Never     Passive exposure: Never    Smokeless tobacco: Never   Vaping Use    Vaping status: Never Used   Substance and Sexual Activity    Alcohol use: Not Currently    Drug use: Never    Sexual activity: Not on file   Other Topics Concern    Not on file   Social History Narrative    Not on file     Social Determinants of Health     Financial Resource Strain: Low Risk  (11/22/2023)    Received from [facility]    Overall Financial Resource Strain (CARDIA)     Difficulty of Paying Living Expenses: Not hard at all   Food Insecurity: No Food Insecurity (11/22/2023)    Received from [facility]    Hunger Vital Sign     Worried About Running Out of Food in the Last Year: Never true     Ran Out of Food in the Last Year: Never true   Transportation Needs: No Transportation Needs (11/22/2023)    Received from [facility]    PRAPARE - Transportation     Lack of Transportation (Medical): No     Lack of Transportation (Non-Medical): No   Physical Activity: Inactive

## 2025-01-20 ENCOUNTER — OFFICE VISIT (OUTPATIENT)
Dept: GERIATRIC MEDICINE | Age: 55
End: 2025-01-20

## 2025-01-20 DIAGNOSIS — B37.9 CANDIDIASIS: Primary | ICD-10-CM

## 2025-01-23 NOTE — PROGRESS NOTES
Transportation (Medical): No     Lack of Transportation (Non-Medical): No   Physical Activity: Inactive (1/7/2025)    Exercise Vital Sign     Days of Exercise per Week: 0 days     Minutes of Exercise per Session: 0 min   Stress: Stress Concern Present (11/22/2023)    Received from [facility]    [employer] - Occupational Stress Questionnaire     Feeling of Stress : To some extent   Social Connections: Socially Isolated (11/22/2023)    Received from [facility]    Social Connection and Isolation Panel [NHANES]     Frequency of Communication with Friends and Family: More than three times a week     Frequency of Social Gatherings with Friends and Family: More than three times a week     Attends Buddhist Services: Never     Active Member of Clubs or Organizations: No     Attends Club or Organization Meetings: Never     Marital Status:    Intimate Partner Violence: Not At Risk (11/22/2023)    Received from [facility]    Humiliation, Afraid, Rape, and Kick questionnaire     Fear of Current or Ex-Partner: No     Emotionally Abused: No     Physically Abused: No     Sexually Abused: No   Housing Stability: Not on file         No results found for: \"LABA1C\"  No results found for: \"EAG\"    Lab Results   Component Value Date/Time     09/17/2024 06:57 PM    K 3.9 09/17/2024 06:57 PM    K 4.3 07/18/2022 08:15 PM     09/17/2024 06:57 PM    CO2 25 09/17/2024 06:57 PM    BUN 9 09/17/2024 06:57 PM    CREATININE 0.6 09/17/2024 06:57 PM    GLUCOSE 76 09/17/2024 06:57 PM    CALCIUM 8.7 09/17/2024 06:57 PM        No results found for: \"CHOL\"  No results found for: \"TRIG\"  No results found for: \"HDL\"  No components found for: \"LDLCHOLESTEROL\", \"LDLCALC\"  No results found for: \"VLDL\"  No results found for: \"CHOLHDLRATIO\"    No results found for: \"TSHFT4\", \"TSH\"    Lab Results   Component Value Date    WBC 7.1 09/17/2024

## 2025-01-24 NOTE — PROGRESS NOTES
[address] [place] [address]    1/20/2025    Frances Giron  is a 54 y.o. in the  being seen for a f/u of   Chief Complaint   Patient presents with    Follow-up       Remains at [facility] for respite stay.  Patient had complaints of urinary discomfort and itching.  UA negative.  Patient continues to complain of itching.  She denies burning, hematuria, back pain.           No past medical history on file.  No past surgical history on file.  No family history on file.  Social History     Socioeconomic History    Marital status: Single     Spouse name: Not on file    Number of children: Not on file    Years of education: Not on file    Highest education level: Not on file   Occupational History    Not on file   Tobacco Use    Smoking status: Never     Passive exposure: Never    Smokeless tobacco: Never   Vaping Use    Vaping status: Never Used   Substance and Sexual Activity    Alcohol use: Not Currently    Drug use: Never    Sexual activity: Not on file   Other Topics Concern    Not on file   Social History Narrative    Not on file     Social Determinants of Health     Financial Resource Strain: Low Risk  (11/22/2023)    Received from [facility]    Overall Financial Resource Strain (CARDIA)     Difficulty of Paying Living Expenses: Not hard at all   Food Insecurity: No Food Insecurity (11/22/2023)    Received from [facility]    Hunger Vital Sign     Worried About Running Out of Food in the Last Year: Never true     Ran Out of Food in the Last Year: Never true   Transportation Needs: No Transportation Needs (11/22/2023)    Received from [facility]    PRAPARE - Transportation     Lack of Transportation (Medical): No     Lack of Transportation (Non-Medical): No   Physical Activity: Inactive (1/7/2025)    Exercise Vital Sign     Days of Exercise per Week: 0 days     Minutes of Exercise per Session: 0 min   Stress:

## 2025-01-28 ENCOUNTER — OFFICE VISIT (OUTPATIENT)
Dept: GERIATRIC MEDICINE | Age: 55
End: 2025-01-28

## 2025-01-28 DIAGNOSIS — M54.50 ACUTE RIGHT-SIDED LOW BACK PAIN WITHOUT SCIATICA: Primary | ICD-10-CM

## 2025-01-30 ASSESSMENT — ENCOUNTER SYMPTOMS: BACK PAIN: 1

## 2025-01-30 NOTE — PROGRESS NOTES
[address] [place] [address]    1/28/2025    Frances Giron  is a 54 y.o. in the [facility] being seen for    Chief Complaint   Patient presents with    Other     Back pain       Remains at [facility].  Patient is being seen today for complaints of back pain.  Patient states she shifted wrong in bed yesterday and feels like she may have pulled a muscle in her right low back.  Patient states she has been having back pain x 1 day and it is minimally improved with Tylenol.  She denies nausea vomiting diarrhea constipation.  No recent fall.  No change in bowel or bladder habits.          No past medical history on file.  No past surgical history on file.  No family history on file.  Social History     Socioeconomic History    Marital status: Single     Spouse name: Not on file    Number of children: Not on file    Years of education: Not on file    Highest education level: Not on file   Occupational History    Not on file   Tobacco Use    Smoking status: Never     Passive exposure: Never    Smokeless tobacco: Never   Vaping Use    Vaping status: Never Used   Substance and Sexual Activity    Alcohol use: Not Currently    Drug use: Never    Sexual activity: Not on file   Other Topics Concern    Not on file   Social History Narrative    Not on file     Social Determinants of Health     Financial Resource Strain: Low Risk  (11/22/2023)    Received from [facility]    Overall Financial Resource Strain (CARDIA)     Difficulty of Paying Living Expenses: Not hard at all   Food Insecurity: No Food Insecurity (11/22/2023)    Received from [facility]    Hunger Vital Sign     Worried About Running Out of Food in the Last Year: Never true     Ran Out of Food in the Last Year: Never true   Transportation Needs: No Transportation Needs (11/22/2023)    Received from [facility]    PRAPARE - Transportation     Lack of Transportation (Medical):

## 2025-02-01 NOTE — PROGRESS NOTES
Received from [facility]    Social Connection and Isolation Panel [NHANES]     Frequency of Communication with Friends and Family: More than three times a week     Frequency of Social Gatherings with Friends and Family: More than three times a week     Attends Caodaism Services: Never     Active Member of Clubs or Organizations: No     Attends Club or Organization Meetings: Never     Marital Status:    Intimate Partner Violence: Not At Risk (11/22/2023)    Received from [facility]    Humiliation, Afraid, Rape, and Kick questionnaire     Fear of Current or Ex-Partner: No     Emotionally Abused: No     Physically Abused: No     Sexually Abused: No   Housing Stability: Not on file         No results found for: \"LABA1C\"  No results found for: \"EAG\"    Lab Results   Component Value Date/Time     09/17/2024 06:57 PM    K 3.9 09/17/2024 06:57 PM    K 4.3 07/18/2022 08:15 PM     09/17/2024 06:57 PM    CO2 25 09/17/2024 06:57 PM    BUN 9 09/17/2024 06:57 PM    CREATININE 0.6 09/17/2024 06:57 PM    GLUCOSE 76 09/17/2024 06:57 PM    CALCIUM 8.7 09/17/2024 06:57 PM        No results found for: \"CHOL\"  No results found for: \"TRIG\"  No results found for: \"HDL\"  No components found for: \"LDLCHOLESTEROL\", \"LDLCALC\"  No results found for: \"VLDL\"  No results found for: \"CHOLHDLRATIO\"    No results found for: \"TSHFT4\", \"TSH\"    Lab Results   Component Value Date    WBC 7.1 09/17/2024    HGB 12.2 09/17/2024    HCT 37.6 09/17/2024    MCV 89.3 09/17/2024     09/17/2024       Please note orders entered on site at facility after discussion with appropriate facility nursing/therapy/ / nutritional staff. Current longstanding medical problems and acute medical issues addressed with staff. Available data and data elements in on site paper chart reviewed and analyzed.  Current external consultant notes reviewed in on site chart. Ordered laboratory testing

## 2025-02-07 ENCOUNTER — OFFICE VISIT (OUTPATIENT)
Dept: GERIATRIC MEDICINE | Age: 55
End: 2025-02-07

## 2025-02-07 DIAGNOSIS — R05.1 ACUTE COUGH: Primary | ICD-10-CM

## 2025-02-13 ASSESSMENT — ENCOUNTER SYMPTOMS
COUGH: 1
RHINORRHEA: 1

## 2025-02-13 NOTE — PROGRESS NOTES
[address] [place] [address]    2/7/2025    Frances Giron  is a 54 y.o. in the  being seen for    Chief Complaint   Patient presents with    Cough       Remains at [facility] for long-term care.  Patient is being seen today for reports of new cough.  Patient resting in room at time of visit.  Patient reports productive cough that started 1 to 2 days ago.  She has no complaints of shortness of breath.  Patient also reports nasal congestion.  No recent fever.  No change in bowel or bladder habits.  She denies nausea vomiting diarrhea or constipation.    Cough  This is a new problem. The current episode started yesterday. The problem has been unchanged. The cough is Productive of sputum. Associated symptoms include nasal congestion and rhinorrhea.         No past medical history on file.  No past surgical history on file.  No family history on file.  Social History     Socioeconomic History    Marital status: Single     Spouse name: Not on file    Number of children: Not on file    Years of education: Not on file    Highest education level: Not on file   Occupational History    Not on file   Tobacco Use    Smoking status: Never     Passive exposure: Never    Smokeless tobacco: Never   Vaping Use    Vaping status: Never Used   Substance and Sexual Activity    Alcohol use: Not Currently    Drug use: Never    Sexual activity: Not on file   Other Topics Concern    Not on file   Social History Narrative    Not on file     Social Determinants of Health     Financial Resource Strain: Low Risk  (11/22/2023)    Received from [facility]    Overall Financial Resource Strain (CARDIA)     Difficulty of Paying Living Expenses: Not hard at all   Food Insecurity: No Food Insecurity (11/22/2023)    Received from [facility]    Hunger Vital Sign     Worried About Running Out of Food in the Last Year: Never true     Ran Out of Food in the Last Year: Never

## 2025-02-14 ENCOUNTER — OFFICE VISIT (OUTPATIENT)
Dept: GERIATRIC MEDICINE | Age: 55
End: 2025-02-14

## 2025-02-14 DIAGNOSIS — J18.9 PNEUMONIA DUE TO INFECTIOUS ORGANISM, UNSPECIFIED LATERALITY, UNSPECIFIED PART OF LUNG: Primary | ICD-10-CM

## 2025-02-19 ASSESSMENT — ENCOUNTER SYMPTOMS
SHORTNESS OF BREATH: 1
COUGH: 1

## 2025-02-19 NOTE — PROGRESS NOTES
[address] [place] [address]    2/14/2025    Frances Giron  is a 54 y.o. in the  being seen for    Chief Complaint   Patient presents with    Follow-up       Remains in long-term care [facility].  Patient is being seen today as follow-up for pneumonia.  Patient resting in bed at time of visit.  Patient continues to report ongoing productive cough.  Patient also has complaints of intermittent shortness of breath.  She denies nausea vomiting diarrhea constipation.  No recent fever.          No past medical history on file.  No past surgical history on file.  No family history on file.  Social History     Socioeconomic History    Marital status: Single     Spouse name: Not on file    Number of children: Not on file    Years of education: Not on file    Highest education level: Not on file   Occupational History    Not on file   Tobacco Use    Smoking status: Never     Passive exposure: Never    Smokeless tobacco: Never   Vaping Use    Vaping status: Never Used   Substance and Sexual Activity    Alcohol use: Not Currently    Drug use: Never    Sexual activity: Not on file   Other Topics Concern    Not on file   Social History Narrative    Not on file     Social Determinants of Health     Financial Resource Strain: Low Risk  (11/22/2023)    Received from [facility]    Overall Financial Resource Strain (CARDIA)     Difficulty of Paying Living Expenses: Not hard at all   Food Insecurity: No Food Insecurity (11/22/2023)    Received from [facility]    Hunger Vital Sign     Worried About Running Out of Food in the Last Year: Never true     Ran Out of Food in the Last Year: Never true   Transportation Needs: No Transportation Needs (11/22/2023)    Received from [facility]    PRAPARE - Transportation     Lack of Transportation (Medical): No     Lack of Transportation (Non-Medical): No   Physical Activity: Inactive

## 2025-02-24 ENCOUNTER — OFFICE VISIT (OUTPATIENT)
Dept: GERIATRIC MEDICINE | Age: 55
End: 2025-02-24
Payer: COMMERCIAL

## 2025-02-24 DIAGNOSIS — R05.1 ACUTE COUGH: ICD-10-CM

## 2025-02-24 DIAGNOSIS — N95.0 POSTMENOPAUSAL VAGINAL BLEEDING: Primary | ICD-10-CM

## 2025-02-24 PROCEDURE — 99309 SBSQ NF CARE MODERATE MDM 30: CPT | Performed by: NURSE PRACTITIONER

## 2025-02-25 DIAGNOSIS — N95.0 PMB (POSTMENOPAUSAL BLEEDING): Primary | ICD-10-CM

## 2025-02-25 DIAGNOSIS — G89.18 POSTOPERATIVE PAIN: Primary | ICD-10-CM

## 2025-02-25 RX ORDER — LORAZEPAM 1 MG/1
TABLET ORAL
Qty: 1 TABLET | Refills: 0 | Status: SHIPPED | OUTPATIENT
Start: 2025-02-25 | End: 2025-02-25

## 2025-02-25 RX ORDER — IBUPROFEN 800 MG/1
TABLET, FILM COATED ORAL
Qty: 40 TABLET | Refills: 0 | Status: SHIPPED | OUTPATIENT
Start: 2025-02-25

## 2025-02-25 RX ORDER — OXYCODONE AND ACETAMINOPHEN 5; 325 MG/1; MG/1
2 TABLET ORAL ONCE
Qty: 2 TABLET | Refills: 0 | Status: SHIPPED | OUTPATIENT
Start: 2025-02-25 | End: 2025-02-25

## 2025-03-03 ASSESSMENT — ENCOUNTER SYMPTOMS: COUGH: 1

## 2025-03-03 NOTE — PROGRESS NOTES
[address] [place] [address]    2/24/2025    Frances Giron  is a 54 y.o. in the NF being seen for    Chief Complaint   Patient presents with    Other     Vaginal bleeding    Cough       Remains at [facility] for long-term care.  Patient being seen today as staff reports episode of vaginal bleeding over the weekend.  Patient resting in bed at time of visit.  Patient reports vaginal bleeding over the weekend that has stopped.  Patient does report that she is postmenopausal.  She denies pain.  Patient also reports a lingering cough, recently treated for pneumonia.  She denies nausea vomiting diarrhea constipation.  No recent fever.          No past medical history on file.  No past surgical history on file.  No family history on file.  Social History     Socioeconomic History    Marital status: Single     Spouse name: Not on file    Number of children: Not on file    Years of education: Not on file    Highest education level: Not on file   Occupational History    Not on file   Tobacco Use    Smoking status: Never     Passive exposure: Never    Smokeless tobacco: Never   Vaping Use    Vaping status: Never Used   Substance and Sexual Activity    Alcohol use: Not Currently    Drug use: Never    Sexual activity: Not on file   Other Topics Concern    Not on file   Social History Narrative    Not on file     Social Determinants of Health     Financial Resource Strain: Low Risk  (11/22/2023)    Received from [facility]    Overall Financial Resource Strain (CARDIA)     Difficulty of Paying Living Expenses: Not hard at all   Food Insecurity: No Food Insecurity (11/22/2023)    Received from [facility]    Hunger Vital Sign     Worried About Running Out of Food in the Last Year: Never true     Ran Out of Food in the Last Year: Never true   Transportation Needs: No Transportation Needs (11/22/2023)    Received from [facility]

## 2025-03-04 ENCOUNTER — HOSPITAL ENCOUNTER (OUTPATIENT)
Dept: ULTRASOUND IMAGING | Age: 55
Discharge: HOME | End: 2025-03-06
Attending: OBSTETRICS & GYNECOLOGY
Payer: COMMERCIAL

## 2025-03-04 DIAGNOSIS — N95.0 PMB (POSTMENOPAUSAL BLEEDING): ICD-10-CM

## 2025-03-04 PROCEDURE — 93975 VASCULAR STUDY: CPT

## 2025-03-04 PROCEDURE — 76830 TRANSVAGINAL US NON-OB: CPT

## 2025-03-04 PROCEDURE — 76856 US EXAM PELVIC COMPLETE: CPT

## 2025-03-14 ENCOUNTER — PROCEDURE VISIT (OUTPATIENT)
Dept: OBGYN CLINIC | Age: 55
End: 2025-03-14
Payer: COMMERCIAL

## 2025-03-14 VITALS
HEIGHT: 62 IN | BODY MASS INDEX: 36.58 KG/M2 | DIASTOLIC BLOOD PRESSURE: 76 MMHG | SYSTOLIC BLOOD PRESSURE: 108 MMHG | HEART RATE: 92 BPM

## 2025-03-14 DIAGNOSIS — R19.00 PELVIC MASS: Primary | ICD-10-CM

## 2025-03-14 PROCEDURE — 1036F TOBACCO NON-USER: CPT | Performed by: OBSTETRICS & GYNECOLOGY

## 2025-03-14 PROCEDURE — G8427 DOCREV CUR MEDS BY ELIG CLIN: HCPCS | Performed by: OBSTETRICS & GYNECOLOGY

## 2025-03-14 PROCEDURE — 99203 OFFICE O/P NEW LOW 30 MIN: CPT | Performed by: OBSTETRICS & GYNECOLOGY

## 2025-03-14 PROCEDURE — G8417 CALC BMI ABV UP PARAM F/U: HCPCS | Performed by: OBSTETRICS & GYNECOLOGY

## 2025-03-14 PROCEDURE — 3017F COLORECTAL CA SCREEN DOC REV: CPT | Performed by: OBSTETRICS & GYNECOLOGY

## 2025-03-14 SDOH — ECONOMIC STABILITY: FOOD INSECURITY: WITHIN THE PAST 12 MONTHS, THE FOOD YOU BOUGHT JUST DIDN'T LAST AND YOU DIDN'T HAVE MONEY TO GET MORE.: PATIENT DECLINED

## 2025-03-14 SDOH — ECONOMIC STABILITY: FOOD INSECURITY: WITHIN THE PAST 12 MONTHS, YOU WORRIED THAT YOUR FOOD WOULD RUN OUT BEFORE YOU GOT MONEY TO BUY MORE.: PATIENT DECLINED

## 2025-03-14 NOTE — PROGRESS NOTES
Frances Giron is a 54 y.o. female who presents here today for complaints of Vaginal Bleeding (She states she had intermittent heavy bleeding for a couple of weeks. Denies currently bleeding. US done 3/4/25.)        History of Present Illness  The patient presents for evaluation of vaginal bleeding.    She experienced significant vaginal bleeding, which has since ceased. The duration of the bleeding episode was approximately 2 to 3 weeks. She has not had a recent Pap smear. She reports no urinary or bowel movement issues.    Supplemental Information  She had a severe stroke 6 years ago, which resulted in paralysis. She was in a locked-in state for 2 weeks. She can not walk, and both legs are weak because she has been laying around. They try to get her to exercise, and she does. She did lose some weight.         Vitals:  /76 (BP Site: Right Lower Arm, Patient Position: Sitting, BP Cuff Size: Small Adult)   Pulse 92   Ht 1.575 m (5' 2\")   BMI 36.58 kg/m²   Allergies:  Bactrim [sulfamethoxazole-trimethoprim], Levofloxacin, Lisinopril, Sulfa antibiotics, and Tramadol  No past medical history on file.  No past surgical history on file.  OB History    No obstetric history on file.       Family History   Problem Relation Age of Onset    Cancer Sister         Uterine    Cancer Sister         Uterine    Cancer Sister         Uterine    Cancer Sister         Uterine     Social History     Socioeconomic History    Marital status: Single     Spouse name: Not on file    Number of children: Not on file    Years of education: Not on file    Highest education level: Not on file   Occupational History    Not on file   Tobacco Use    Smoking status: Never     Passive exposure: Never    Smokeless tobacco: Never   Vaping Use    Vaping status: Never Used   Substance and Sexual Activity    Alcohol use: Not Currently    Drug use: Never    Sexual activity: Not on file   Other Topics Concern    Not on file   Social History

## 2025-03-21 LAB — CA 125: 6

## 2025-03-26 ENCOUNTER — OFFICE VISIT (OUTPATIENT)
Dept: GERIATRIC MEDICINE | Age: 55
End: 2025-03-26
Payer: COMMERCIAL

## 2025-03-26 DIAGNOSIS — G62.9 NEUROPATHY: ICD-10-CM

## 2025-03-26 DIAGNOSIS — K21.9 GASTROESOPHAGEAL REFLUX DISEASE WITHOUT ESOPHAGITIS: ICD-10-CM

## 2025-03-26 DIAGNOSIS — J44.9 CHRONIC OBSTRUCTIVE PULMONARY DISEASE, UNSPECIFIED COPD TYPE (HCC): Primary | ICD-10-CM

## 2025-03-26 PROCEDURE — 99309 SBSQ NF CARE MODERATE MDM 30: CPT | Performed by: INTERNAL MEDICINE

## 2025-04-02 ENCOUNTER — TELEPHONE (OUTPATIENT)
Dept: OBGYN CLINIC | Age: 55
End: 2025-04-02

## 2025-04-02 DIAGNOSIS — F41.9 SEVERE ANXIETY: Primary | ICD-10-CM

## 2025-04-02 RX ORDER — ALPRAZOLAM 1 MG/1
1 TABLET ORAL 2 TIMES DAILY PRN
Qty: 2 TABLET | Refills: 0 | Status: SHIPPED | OUTPATIENT
Start: 2025-04-02 | End: 2025-04-02

## 2025-04-02 RX ORDER — ALPRAZOLAM 1 MG/1
1 TABLET ORAL 2 TIMES DAILY PRN
Qty: 2 TABLET | Refills: 0 | Status: SHIPPED | OUTPATIENT
Start: 2025-04-02 | End: 2025-05-02

## 2025-04-02 NOTE — TELEPHONE ENCOUNTER
Patient has been scheduled for her pelvic MRI on 4/4/25, she is claustrophobic and has requested something to calm or sedate to her pharmacy , please fax order to [phone]

## 2025-04-03 ENCOUNTER — OFFICE VISIT (OUTPATIENT)
Dept: GERIATRIC MEDICINE | Age: 55
End: 2025-04-03
Payer: COMMERCIAL

## 2025-04-03 DIAGNOSIS — R13.10 DYSPHAGIA, UNSPECIFIED TYPE: Primary | ICD-10-CM

## 2025-04-03 DIAGNOSIS — Z86.73 HISTORY OF CVA (CEREBROVASCULAR ACCIDENT): ICD-10-CM

## 2025-04-03 PROCEDURE — 99309 SBSQ NF CARE MODERATE MDM 30: CPT | Performed by: NURSE PRACTITIONER

## 2025-04-04 ENCOUNTER — ANESTHESIA (OUTPATIENT)
Dept: MRI IMAGING | Age: 55
End: 2025-04-04
Payer: COMMERCIAL

## 2025-04-04 ENCOUNTER — HOSPITAL ENCOUNTER (OUTPATIENT)
Dept: MRI IMAGING | Age: 55
Discharge: HOME | End: 2025-04-04
Attending: OBSTETRICS & GYNECOLOGY
Payer: COMMERCIAL

## 2025-04-04 ENCOUNTER — ANESTHESIA EVENT (OUTPATIENT)
Dept: MRI IMAGING | Age: 55
End: 2025-04-04
Payer: COMMERCIAL

## 2025-04-04 VITALS
TEMPERATURE: 97.1 F | HEIGHT: 62 IN | RESPIRATION RATE: 12 BRPM | BODY MASS INDEX: 40.12 KG/M2 | WEIGHT: 218 LBS | OXYGEN SATURATION: 94 % | HEART RATE: 70 BPM | DIASTOLIC BLOOD PRESSURE: 78 MMHG | SYSTOLIC BLOOD PRESSURE: 148 MMHG

## 2025-04-04 DIAGNOSIS — R19.00 PELVIC MASS: ICD-10-CM

## 2025-04-04 LAB
HCG, URINE, POC: NEGATIVE
Lab: NORMAL
NEGATIVE QC PASS/FAIL: NORMAL
POSITIVE QC PASS/FAIL: NORMAL

## 2025-04-04 PROCEDURE — A9577 INJ MULTIHANCE: HCPCS | Performed by: OBSTETRICS & GYNECOLOGY

## 2025-04-04 PROCEDURE — 6360000004 HC RX CONTRAST MEDICATION: Performed by: OBSTETRICS & GYNECOLOGY

## 2025-04-04 PROCEDURE — 6360000002 HC RX W HCPCS: Performed by: NURSE ANESTHETIST, CERTIFIED REGISTERED

## 2025-04-04 PROCEDURE — 7100000010 HC PHASE II RECOVERY - FIRST 15 MIN

## 2025-04-04 PROCEDURE — 3700000000 HC ANESTHESIA ATTENDED CARE

## 2025-04-04 PROCEDURE — 3700000001 HC ADD 15 MINUTES (ANESTHESIA)

## 2025-04-04 PROCEDURE — 7100000011 HC PHASE II RECOVERY - ADDTL 15 MIN

## 2025-04-04 PROCEDURE — 72197 MRI PELVIS W/O & W/DYE: CPT

## 2025-04-04 RX ORDER — ONDANSETRON 2 MG/ML
4 INJECTION INTRAMUSCULAR; INTRAVENOUS
Status: ACTIVE | OUTPATIENT
Start: 2025-04-04

## 2025-04-04 RX ORDER — FENTANYL CITRATE 0.05 MG/ML
50 INJECTION, SOLUTION INTRAMUSCULAR; INTRAVENOUS EVERY 10 MIN PRN
Status: ACTIVE | OUTPATIENT
Start: 2025-04-04

## 2025-04-04 RX ORDER — SODIUM CHLORIDE 0.9 % (FLUSH) 0.9 %
5-40 SYRINGE (ML) INJECTION PRN
Status: ACTIVE | OUTPATIENT
Start: 2025-04-04

## 2025-04-04 RX ORDER — OXYCODONE HYDROCHLORIDE 5 MG/1
5 TABLET ORAL
Status: ACTIVE | OUTPATIENT
Start: 2025-04-04

## 2025-04-04 RX ORDER — LIDOCAINE HYDROCHLORIDE 10 MG/ML
1 INJECTION, SOLUTION EPIDURAL; INFILTRATION; INTRACAUDAL; PERINEURAL
Status: ACTIVE | OUTPATIENT
Start: 2025-04-04

## 2025-04-04 RX ORDER — SODIUM CHLORIDE 9 MG/ML
INJECTION, SOLUTION INTRAVENOUS PRN
Status: ACTIVE | OUTPATIENT
Start: 2025-04-04

## 2025-04-04 RX ORDER — SODIUM CHLORIDE 0.9 % (FLUSH) 0.9 %
5-40 SYRINGE (ML) INJECTION EVERY 12 HOURS SCHEDULED
Status: DISPENSED | OUTPATIENT
Start: 2025-04-04

## 2025-04-04 RX ORDER — MIDAZOLAM HYDROCHLORIDE 1 MG/ML
INJECTION, SOLUTION INTRAMUSCULAR; INTRAVENOUS
Status: DISCONTINUED | OUTPATIENT
Start: 2025-04-04 | End: 2025-04-04 | Stop reason: SDUPTHER

## 2025-04-04 RX ORDER — SODIUM CHLORIDE 9 MG/ML
INJECTION, SOLUTION INTRAVENOUS CONTINUOUS
Status: DISPENSED | OUTPATIENT
Start: 2025-04-04

## 2025-04-04 RX ORDER — MEPERIDINE HYDROCHLORIDE 25 MG/ML
12.5 INJECTION INTRAMUSCULAR; INTRAVENOUS; SUBCUTANEOUS
Status: ACTIVE | OUTPATIENT
Start: 2025-04-04

## 2025-04-04 RX ORDER — DIPHENHYDRAMINE HYDROCHLORIDE 50 MG/ML
12.5 INJECTION, SOLUTION INTRAMUSCULAR; INTRAVENOUS
Status: ACTIVE | OUTPATIENT
Start: 2025-04-04

## 2025-04-04 RX ORDER — METOCLOPRAMIDE HYDROCHLORIDE 5 MG/ML
10 INJECTION INTRAMUSCULAR; INTRAVENOUS
Status: ACTIVE | OUTPATIENT
Start: 2025-04-04

## 2025-04-04 RX ORDER — NALOXONE HYDROCHLORIDE 0.4 MG/ML
INJECTION, SOLUTION INTRAMUSCULAR; INTRAVENOUS; SUBCUTANEOUS PRN
Status: ACTIVE | OUTPATIENT
Start: 2025-04-04

## 2025-04-04 RX ADMIN — GADOBENATE DIMEGLUMINE 20 ML: 529 INJECTION, SOLUTION INTRAVENOUS at 14:03

## 2025-04-04 RX ADMIN — MIDAZOLAM HYDROCHLORIDE 4 MG: 1 INJECTION, SOLUTION INTRAMUSCULAR; INTRAVENOUS at 13:21

## 2025-04-04 ASSESSMENT — PAIN - FUNCTIONAL ASSESSMENT: PAIN_FUNCTIONAL_ASSESSMENT: 0-10

## 2025-04-04 NOTE — PROGRESS NOTES
Patient dressed with assist of aid. Repositioned for comfort waiting for [facility]. Shakes head \"yes\" when asked \"if comfortable\".

## 2025-04-04 NOTE — ANESTHESIA POSTPROCEDURE EVALUATION
Department of Anesthesiology  Postprocedure Note    Patient: Frances Giron  MRN: [id number]  YOB: 1970  Date of evaluation: 4/4/2025    Procedure Summary       Date: 04/04/25 Room / Location: [facility]    Anesthesia Start: 1320 Anesthesia Stop: 1426    Procedure: MRI PELVIS W WO CONTRAST Diagnosis:       Pelvic mass      (pelvic mass)    Scheduled Providers:  Responsible Provider: Celso Smith DO    Anesthesia Type: MAC ASA Status: 3            Anesthesia Type: No value filed.    Roseanna Phase I: Roseanna Score: 10    Roseanna Phase II:      Anesthesia Post Evaluation    Patient location during evaluation: bedside  Patient participation: complete - patient participated  Level of consciousness: awake and awake and alert  Airway patency: patent  Nausea & Vomiting: no nausea and no vomiting  Cardiovascular status: hemodynamically stable  Respiratory status: acceptable  Hydration status: stable  Pain management: adequate        No notable events documented.

## 2025-04-04 NOTE — ANESTHESIA PRE PROCEDURE
Counseling given: Not Answered      Vital Signs (Current):   Vitals:    04/04/25 1134   BP: (!) 145/83   Pulse: 78   Resp: 14   Temp: 97 °F (36.1 °C)   TempSrc: Temporal   SpO2: 94%   Weight: 98.9 kg (218 lb)   Height: 1.575 m (5' 2\")                                              BP Readings from Last 3 Encounters:   04/04/25 (!) 145/83   03/14/25 108/76   10/11/23 120/75       NPO Status: Time of last liquid consumption: 1700                        Time of last solid consumption: 1700                        Date of last liquid consumption: 04/03/25                        Date of last solid food consumption: 04/03/25    BMI:   Wt Readings from Last 3 Encounters:   04/04/25 98.9 kg (218 lb)   10/11/23 90.7 kg (200 lb)   06/28/23 90.7 kg (200 lb)     Body mass index is 39.87 kg/m².    CBC:   Lab Results   Component Value Date/Time    WBC 7.1 09/17/2024 06:57 PM    RBC 4.21 09/17/2024 06:57 PM    HGB 12.2 09/17/2024 06:57 PM    HCT 37.6 09/17/2024 06:57 PM    MCV 89.3 09/17/2024 06:57 PM    RDW 16.2 06/28/2023 03:15 AM     09/17/2024 06:57 PM       CMP:   Lab Results   Component Value Date/Time     09/17/2024 06:57 PM    K 3.9 09/17/2024 06:57 PM    K 4.3 07/18/2022 08:15 PM     09/17/2024 06:57 PM    CO2 25 09/17/2024 06:57 PM    BUN 9 09/17/2024 06:57 PM    CREATININE 0.6 09/17/2024 06:57 PM    GFRAA >60.0 07/18/2022 08:15 PM    LABGLOM 104 09/17/2024 06:57 PM    GLUCOSE 76 09/17/2024 06:57 PM    CALCIUM 8.7 09/17/2024 06:57 PM    BILITOT 0.7 06/28/2023 03:15 AM    ALKPHOS 111 06/28/2023 03:15 AM    AST 21 06/28/2023 03:15 AM    ALT 22 06/28/2023 03:15 AM       POC Tests: No results for input(s): \"POCGLU\", \"POCNA\", \"POCK\", \"POCCL\", \"POCBUN\", \"POCHEMO\", \"POCHCT\" in the last 72 hours.    Coags:   Lab Results   Component Value Date/Time    PROTIME 12.6 07/18/2022 08:15 PM    INR 0.9 07/18/2022 08:15 PM    APTT 30.0 07/18/2022 08:15 PM       HCG (If Applicable): No results found for:

## 2025-04-04 NOTE — PROGRESS NOTES
[facility] called to arrange transport of patient back to [facility]. [facility] called and given patient report . Dietary called to request \"honey thick\" beverage for patient, states they will call back. Patient's care giver remains at cart side.

## 2025-04-07 ENCOUNTER — OFFICE VISIT (OUTPATIENT)
Dept: OBGYN CLINIC | Age: 55
End: 2025-04-07
Payer: COMMERCIAL

## 2025-04-07 VITALS
BODY MASS INDEX: 39.87 KG/M2 | HEIGHT: 62 IN | SYSTOLIC BLOOD PRESSURE: 108 MMHG | DIASTOLIC BLOOD PRESSURE: 68 MMHG | HEART RATE: 92 BPM

## 2025-04-07 DIAGNOSIS — R19.00 PELVIC MASS: Primary | ICD-10-CM

## 2025-04-07 PROCEDURE — G8417 CALC BMI ABV UP PARAM F/U: HCPCS | Performed by: OBSTETRICS & GYNECOLOGY

## 2025-04-07 PROCEDURE — 99213 OFFICE O/P EST LOW 20 MIN: CPT | Performed by: OBSTETRICS & GYNECOLOGY

## 2025-04-07 PROCEDURE — 1036F TOBACCO NON-USER: CPT | Performed by: OBSTETRICS & GYNECOLOGY

## 2025-04-07 PROCEDURE — G8427 DOCREV CUR MEDS BY ELIG CLIN: HCPCS | Performed by: OBSTETRICS & GYNECOLOGY

## 2025-04-07 PROCEDURE — 3017F COLORECTAL CA SCREEN DOC REV: CPT | Performed by: OBSTETRICS & GYNECOLOGY

## 2025-04-07 NOTE — PROGRESS NOTES
[address] [place] [address]    4/3/2025    Frances Giron  is a 54 y.o. in the  being seen for    Chief Complaint   Patient presents with    Follow-up       Remains at [facility] for long-term care.  Patient is being seen today to discuss concerns with current diet.  Patient with history of CVA and has dysphagia.  Patient is working with speech therapy.  Patient is currently on a puréed, honey thick diet.  Patient expresses her concerns that her food is not correct consistency.  Per patient her food comes out and it is too thin for her.  Per staff patient's food has been coming out of the kitchen and the correct puréed honey thick consistency.          Past Medical History:   Diagnosis Date    Anxiety and depression     Cerebral artery occlusion with cerebral infarction (HCC)     COPD (chronic obstructive pulmonary disease) (HCC)     GERD (gastroesophageal reflux disease)     Hypertension     PTSD (post-traumatic stress disorder)      Past Surgical History:   Procedure Laterality Date     SECTION      x3    NASAL SINUS SURGERY      TONSILLECTOMY      TUBAL LIGATION      x2, first attempt failed     Family History   Problem Relation Age of Onset    Cancer Sister         Uterine    Cancer Sister         Uterine    Cancer Sister         Uterine    Cancer Sister         Uterine     Social History     Socioeconomic History    Marital status: Single     Spouse name: Not on file    Number of children: Not on file    Years of education: Not on file    Highest education level: Not on file   Occupational History    Not on file   Tobacco Use    Smoking status: Never     Passive exposure: Never    Smokeless tobacco: Never   Vaping Use    Vaping status: Never Used   Substance and Sexual Activity    Alcohol use: Not Currently    Drug use: Never    Sexual activity: Not on file   Other Topics Concern    Not on file   Social History Narrative    Not on file     Social Drivers of Health

## 2025-04-07 NOTE — PROGRESS NOTES
Frances Giron is a 54 y.o. female who presents here today for complaints of Follow-up (MRI done 25.)        History of Present Illness  The patient presents for evaluation of an enlarged uterus and obesity. She has not undergone any biopsies in the office due to the location of the mass in the cervix. MRI results are pending to determine the next steps.    She reports a history of a hysterectomy. Currently, she is on anticoagulant therapy with aspirin. There is a consideration for initiating Ozempic treatment for weight management, which will be discussed with her primary care physician.    PAST SURGICAL HISTORY: Hysterectomy         Vitals:  /68   Pulse 92   Ht 1.575 m (5' 2\")   LMP 2025   BMI 39.87 kg/m²   Allergies:  Bactrim [sulfamethoxazole-trimethoprim], Levofloxacin, Lisinopril, Sulfa antibiotics, and Tramadol  Past Medical History:   Diagnosis Date    Anxiety and depression     Cerebral artery occlusion with cerebral infarction (HCC)     COPD (chronic obstructive pulmonary disease) (HCC)     GERD (gastroesophageal reflux disease)     Hypertension     PTSD (post-traumatic stress disorder)      Past Surgical History:   Procedure Laterality Date     SECTION      x3    NASAL SINUS SURGERY      TONSILLECTOMY      TUBAL LIGATION      x2, first attempt failed     OB History    No obstetric history on file.       Family History   Problem Relation Age of Onset    Cancer Sister         Uterine    Cancer Sister         Uterine    Cancer Sister         Uterine    Cancer Sister         Uterine     Social History     Socioeconomic History    Marital status: Single     Spouse name: Not on file    Number of children: Not on file    Years of education: Not on file    Highest education level: Not on file   Occupational History    Not on file   Tobacco Use    Smoking status: Never     Passive exposure: Never    Smokeless tobacco: Never   Vaping Use    Vaping status: Never Used   Substance

## 2025-04-08 PROBLEM — G62.9 NEUROPATHY: Status: ACTIVE | Noted: 2025-04-08

## 2025-04-09 ENCOUNTER — OFFICE VISIT (OUTPATIENT)
Dept: GERIATRIC MEDICINE | Age: 55
End: 2025-04-09
Payer: COMMERCIAL

## 2025-04-09 DIAGNOSIS — J44.9 CHRONIC OBSTRUCTIVE PULMONARY DISEASE, UNSPECIFIED COPD TYPE (HCC): Primary | ICD-10-CM

## 2025-04-09 DIAGNOSIS — G62.9 NEUROPATHY: ICD-10-CM

## 2025-04-09 DIAGNOSIS — K21.9 GASTROESOPHAGEAL REFLUX DISEASE WITHOUT ESOPHAGITIS: ICD-10-CM

## 2025-04-09 DIAGNOSIS — F33.1 MODERATE EPISODE OF RECURRENT MAJOR DEPRESSIVE DISORDER (HCC): ICD-10-CM

## 2025-04-09 PROCEDURE — 99309 SBSQ NF CARE MODERATE MDM 30: CPT | Performed by: INTERNAL MEDICINE

## 2025-04-09 NOTE — PROGRESS NOTES
SUBJECTIVE:  This 54-year-old woman seen for COPD GERD neuropathy patient baseline acute pain crisis emesis or chills coughing at baseline acute pain crisis      ROS: Dry cough  The rest of the 14 point ROS negative    PHYSICAL EXAM: VSS per facility record  Pupils reactive oral mucosa moist chest no rales wheezing abdomen soft tender no rash    ASSESSMENT & PLAN:   Diagnosis Orders   1. Chronic obstructive pulmonary disease, unspecified COPD type (HCC)        2. Gastroesophageal reflux disease without esophagitis        3. Neuropathy          Has been on gabapentin Tarbell acute pain crisis functionally at this time for continued respiratory meds remains a problem however functionally weak no acute pain crisis            Past Medical History:   Diagnosis Date    Anxiety and depression     Cerebral artery occlusion with cerebral infarction (HCC)     COPD (chronic obstructive pulmonary disease) (HCC)     GERD (gastroesophageal reflux disease)     Hypertension     PTSD (post-traumatic stress disorder)          Past Surgical History:   Procedure Laterality Date     SECTION      x3    NASAL SINUS SURGERY      TONSILLECTOMY      TUBAL LIGATION      x2, first attempt failed         Current Outpatient Medications on File Prior to Visit   Medication Sig Dispense Refill    ibuprofen (ADVIL;MOTRIN) 800 MG tablet Take one tablet 30 mins before procedure then use every 8 hrs as needed. (Patient not taking: Reported on 2025) 40 tablet 0    albuterol (PROVENTIL) (2.5 MG/3ML) 0.083% nebulizer solution Take 3 mLs by nebulization 2 times daily Indications: Chronic Obstructive Lung Disease      aspirin 81 MG chewable tablet 1 tablet by Per G Tube route daily Indications: Prevention of Unwanted Clot in Veins      atorvastatin (LIPITOR) 80 MG tablet 1 tablet by Per G Tube route nightly Indications: High Amount of Fats in the Blood      baclofen (LIORESAL) 10 MG tablet 1 tablet by Per G Tube route See Admin Instructions

## 2025-04-15 ENCOUNTER — TELEPHONE (OUTPATIENT)
Dept: OBGYN CLINIC | Age: 55
End: 2025-04-15

## 2025-04-15 NOTE — TELEPHONE ENCOUNTER
Patient sister Kristin called in to go over MRI results she states that it has been a little over week that they were done and nothing has been resulted as of yet. Please advise Kristin Thank you

## 2025-04-16 NOTE — TELEPHONE ENCOUNTER
Called and spoke with MRI department. They're going to call [facility] to find out what's going on and to have report put in.

## 2025-04-17 ENCOUNTER — RESULTS FOLLOW-UP (OUTPATIENT)
Dept: OBGYN CLINIC | Age: 55
End: 2025-04-17

## 2025-04-17 NOTE — TELEPHONE ENCOUNTER
Sister called , made aware results were just received , and someone from the office would reach out after the dr reviews. She was advised provider was in surgery and call may not be until this afternoon,

## 2025-04-17 NOTE — TELEPHONE ENCOUNTER
Cervical cancer. Another message was sent to place referral to GYN ONC at [facility] . Not a good candidate for any surgical procedures at our hospital due to lack for such services at our facility .

## 2025-04-17 NOTE — TELEPHONE ENCOUNTER
Called and spoke with MRI department again. They've talked to multiple people at [facility] but it's being assigned to somebody to read and results are expected sometime today. MRI department is going to keep an eye out for the result and will call the office when it's in.

## 2025-04-18 ENCOUNTER — OFFICE VISIT (OUTPATIENT)
Dept: GERIATRIC MEDICINE | Age: 55
End: 2025-04-18

## 2025-04-18 ENCOUNTER — TELEPHONE (OUTPATIENT)
Dept: OBGYN CLINIC | Age: 55
End: 2025-04-18

## 2025-04-18 DIAGNOSIS — F33.1 MODERATE EPISODE OF RECURRENT MAJOR DEPRESSIVE DISORDER (HCC): ICD-10-CM

## 2025-04-18 DIAGNOSIS — Z91.89 HIGH RISK FOR CERVICAL CANCER: Primary | ICD-10-CM

## 2025-04-18 DIAGNOSIS — G62.9 NEUROPATHY: ICD-10-CM

## 2025-04-18 DIAGNOSIS — J42 CHRONIC BRONCHITIS, UNSPECIFIED CHRONIC BRONCHITIS TYPE (HCC): Primary | ICD-10-CM

## 2025-04-28 PROBLEM — F33.1 MODERATE EPISODE OF RECURRENT MAJOR DEPRESSIVE DISORDER (HCC): Status: ACTIVE | Noted: 2025-04-28

## 2025-04-28 NOTE — PROGRESS NOTES
G Tube route daily Indications: Prevention of Unwanted Clot in Veins      atorvastatin (LIPITOR) 80 MG tablet 1 tablet by Per G Tube route nightly Indications: High Amount of Fats in the Blood      baclofen (LIORESAL) 10 MG tablet 1 tablet by Per G Tube route See Admin Instructions Indications: Muscle Spasm Give 20mg every morning & 10 mg every evening.      citalopram (CELEXA) 40 MG tablet 1 tablet by Per G Tube route nightly Indications: Depression      famotidine (PEPCID) 20 MG tablet 1 tablet by Per G Tube route nightly Indications: Gastroesophageal Reflux Disease      fluticasone (FLONASE) 50 MCG/ACT nasal spray 1 spray by Each Nostril route daily Indications: Allergy      gabapentin (NEURONTIN) 250 MG/5ML solution 5 mLs by Per G Tube route nightly. Indications: Nerve Disease      loratadine (CLARITIN) 10 MG tablet 1 tablet by Per G Tube route daily Indications: Allergy (Patient not taking: Reported on 4/4/2025)      montelukast (SINGULAIR) 10 MG tablet 1 tablet by Per G Tube route every morning Indications: Allergy      nystatin (MYCOSTATIN) 662230 UNIT/GM powder Apply topically daily Indications: Skin Infection due to Candida Yeast Apply topically 4 times daily. Day shift to skin folds groin, abdomen under breasts      senna (SENOKOT) 8.6 MG tablet 2 tablets by Per G Tube route nightly Indications: Constipation      traZODone (DESYREL) 100 MG tablet 1 tablet by Per G Tube route nightly Indications: Trouble Sleeping       No current facility-administered medications on file prior to visit.         Family History   Problem Relation Age of Onset    Cancer Sister         Uterine    Cancer Sister         Uterine    Cancer Sister         Uterine    Cancer Sister         Uterine       Social History     Socioeconomic History    Marital status: Single     Spouse name: Not on file    Number of children: Not on file    Years of education: Not on file    Highest education level: Not on file   Occupational History    Not

## 2025-05-01 ENCOUNTER — OFFICE VISIT (OUTPATIENT)
Dept: GERIATRIC MEDICINE | Age: 55
End: 2025-05-01

## 2025-05-01 DIAGNOSIS — Z86.73 HISTORY OF CVA (CEREBROVASCULAR ACCIDENT): Primary | ICD-10-CM

## 2025-05-01 DIAGNOSIS — R13.10 DYSPHAGIA, UNSPECIFIED TYPE: ICD-10-CM

## 2025-05-01 DIAGNOSIS — J44.9 CHRONIC OBSTRUCTIVE PULMONARY DISEASE, UNSPECIFIED COPD TYPE (HCC): ICD-10-CM

## 2025-05-02 ENCOUNTER — OFFICE VISIT (OUTPATIENT)
Dept: GERIATRIC MEDICINE | Age: 55
End: 2025-05-02

## 2025-05-02 DIAGNOSIS — F41.9 ANXIETY: ICD-10-CM

## 2025-05-02 DIAGNOSIS — F33.1 MODERATE EPISODE OF RECURRENT MAJOR DEPRESSIVE DISORDER (HCC): Primary | ICD-10-CM

## 2025-05-03 ENCOUNTER — OFFICE VISIT (OUTPATIENT)
Dept: GERIATRIC MEDICINE | Age: 55
End: 2025-05-03

## 2025-05-03 DIAGNOSIS — J44.9 CHRONIC OBSTRUCTIVE PULMONARY DISEASE, UNSPECIFIED COPD TYPE (HCC): Primary | ICD-10-CM

## 2025-05-06 ENCOUNTER — OFFICE VISIT (OUTPATIENT)
Dept: GERIATRIC MEDICINE | Age: 55
End: 2025-05-06
Payer: COMMERCIAL

## 2025-05-06 DIAGNOSIS — F33.1 MODERATE EPISODE OF RECURRENT MAJOR DEPRESSIVE DISORDER (HCC): ICD-10-CM

## 2025-05-06 DIAGNOSIS — Z79.899 ENCOUNTER FOR MEDICATION REVIEW: Primary | ICD-10-CM

## 2025-05-06 DIAGNOSIS — F41.9 ANXIETY: ICD-10-CM

## 2025-05-06 PROCEDURE — 99309 SBSQ NF CARE MODERATE MDM 30: CPT | Performed by: NURSE PRACTITIONER

## 2025-05-07 NOTE — PROGRESS NOTES
[address] [place] [address]    2025    Frances Giron  is a 54 y.o. in the  being seen for    Chief Complaint   Patient presents with    Follow-up       Patient is being seen today as staff reports increase in agitation and aggression. Patient has been refusing medications and care. At time of visit, she is resting in bed. She reports feeling frustrated and angry with the staff. She states she would like to go to [facility] but she does not want the facility to make arrangements for her. She also reports that she does not agree with her current plan of care regarding her diet order and della lift.           Past Medical History:   Diagnosis Date    Anxiety and depression     Cerebral artery occlusion with cerebral infarction (HCC)     COPD (chronic obstructive pulmonary disease) (HCC)     GERD (gastroesophageal reflux disease)     Hypertension     PTSD (post-traumatic stress disorder)      Past Surgical History:   Procedure Laterality Date     SECTION      x3    NASAL SINUS SURGERY      TONSILLECTOMY      TUBAL LIGATION      x2, first attempt failed     Family History   Problem Relation Age of Onset    Cancer Sister         Uterine    Cancer Sister         Uterine    Cancer Sister         Uterine    Cancer Sister         Uterine     Social History     Socioeconomic History    Marital status: Single     Spouse name: Not on file    Number of children: Not on file    Years of education: Not on file    Highest education level: Not on file   Occupational History    Not on file   Tobacco Use    Smoking status: Never     Passive exposure: Never    Smokeless tobacco: Never   Vaping Use    Vaping status: Never Used   Substance and Sexual Activity    Alcohol use: Not Currently    Drug use: Never    Sexual activity: Not on file   Other Topics Concern    Not on file   Social History Narrative    Not on file     Social Drivers of Health     Financial Resource Strain: Low Risk  (2023)

## 2025-05-07 NOTE — PROGRESS NOTES
[address] [place] [address]    2025    Frances Giron  is a 54 y.o. in the  being seen for    Chief Complaint   Patient presents with    Follow-up    Other     Medication review       Remains at [facility] for long term care. Patient requested a medication review today- she would like prn xanax and cough syrup discontinued as she has not been using them. Patient also expresses her frustration with not being allowed to hover over the bedside commode while in the della lift. Patient has now been using the bedpan.           Past Medical History:   Diagnosis Date    Anxiety and depression     Cerebral artery occlusion with cerebral infarction (HCC)     COPD (chronic obstructive pulmonary disease) (HCC)     GERD (gastroesophageal reflux disease)     Hypertension     PTSD (post-traumatic stress disorder)      Past Surgical History:   Procedure Laterality Date     SECTION      x3    NASAL SINUS SURGERY      TONSILLECTOMY      TUBAL LIGATION      x2, first attempt failed     Family History   Problem Relation Age of Onset    Cancer Sister         Uterine    Cancer Sister         Uterine    Cancer Sister         Uterine    Cancer Sister         Uterine     Social History     Socioeconomic History    Marital status: Single     Spouse name: Not on file    Number of children: Not on file    Years of education: Not on file    Highest education level: Not on file   Occupational History    Not on file   Tobacco Use    Smoking status: Never     Passive exposure: Never    Smokeless tobacco: Never   Vaping Use    Vaping status: Never Used   Substance and Sexual Activity    Alcohol use: Not Currently    Drug use: Never    Sexual activity: Not on file   Other Topics Concern    Not on file   Social History Narrative    Not on file     Social Drivers of Health     Financial Resource Strain: Low Risk  (2023)    Received from [facility]    [employer]

## 2025-05-09 ENCOUNTER — OFFICE VISIT (OUTPATIENT)
Dept: GERIATRIC MEDICINE | Age: 55
End: 2025-05-09

## 2025-05-09 DIAGNOSIS — R30.0 DYSURIA: Primary | ICD-10-CM

## 2025-05-09 LAB
ANION GAP SERPL CALCULATED.3IONS-SCNC: 12 MEQ/L (ref 9–15)
BACTERIA URNS QL MICRO: NEGATIVE /HPF
BASOPHILS # BLD: 0 K/UL (ref 0–0.2)
BASOPHILS NFR BLD: 0.3 %
BILIRUB UR QL STRIP: ABNORMAL
BUN SERPL-MCNC: 13 MG/DL (ref 6–20)
CALCIUM SERPL-MCNC: 8.9 MG/DL (ref 8.5–9.9)
CHLORIDE SERPL-SCNC: 104 MEQ/L (ref 95–107)
CLARITY UR: ABNORMAL
CO2 SERPL-SCNC: 26 MEQ/L (ref 20–31)
COLOR UR: ABNORMAL
CREAT SERPL-MCNC: 0.52 MG/DL (ref 0.5–0.9)
EOSINOPHIL # BLD: 0.2 K/UL (ref 0–0.7)
EOSINOPHIL NFR BLD: 2.5 %
EPI CELLS #/AREA URNS AUTO: ABNORMAL /HPF (ref 0–5)
ERYTHROCYTE [DISTWIDTH] IN BLOOD BY AUTOMATED COUNT: 14.4 % (ref 11.5–14.5)
GLUCOSE SERPL-MCNC: 112 MG/DL (ref 70–99)
GLUCOSE UR STRIP-MCNC: NEGATIVE MG/DL
HCT VFR BLD AUTO: 41.3 % (ref 37–47)
HGB BLD-MCNC: 13 G/DL (ref 12–16)
HGB UR QL STRIP: ABNORMAL
HYALINE CASTS #/AREA URNS AUTO: ABNORMAL /HPF (ref 0–5)
KETONES UR STRIP-MCNC: NEGATIVE MG/DL
LEUKOCYTE ESTERASE UR QL STRIP: ABNORMAL
LYMPHOCYTES # BLD: 1.2 K/UL (ref 1–4.8)
LYMPHOCYTES NFR BLD: 13.6 %
MCH RBC QN AUTO: 28.2 PG (ref 27–31.3)
MCHC RBC AUTO-ENTMCNC: 31.5 % (ref 33–37)
MCV RBC AUTO: 89.6 FL (ref 79.4–94.8)
MONOCYTES # BLD: 0.4 K/UL (ref 0.2–0.8)
MONOCYTES NFR BLD: 4.6 %
NEUTROPHILS # BLD: 7.2 K/UL (ref 1.4–6.5)
NEUTS SEG NFR BLD: 78.7 %
NITRITE UR QL STRIP: POSITIVE
PH UR STRIP: 6 [PH] (ref 5–9)
PLATELET # BLD AUTO: 289 K/UL (ref 130–400)
POTASSIUM SERPL-SCNC: 4.2 MEQ/L (ref 3.4–4.9)
PROT UR STRIP-MCNC: >=300 MG/DL
RBC # BLD AUTO: 4.61 M/UL (ref 4.2–5.4)
RBC #/AREA URNS HPF: >100 /HPF (ref 0–2)
SODIUM SERPL-SCNC: 142 MEQ/L (ref 135–144)
SP GR UR STRIP: 1.02 (ref 1–1.03)
URINE REFLEX TO CULTURE: YES
UROBILINOGEN UR STRIP-ACNC: 1 E.U./DL
WBC # BLD AUTO: 9.1 K/UL (ref 4.8–10.8)
WBC #/AREA URNS AUTO: >100 /HPF (ref 0–5)

## 2025-05-11 LAB
BACTERIA UR CULT: ABNORMAL
BACTERIA UR CULT: ABNORMAL
ORGANISM: ABNORMAL

## 2025-05-12 LAB
BACTERIA UR CULT: ABNORMAL
BACTERIA UR CULT: ABNORMAL
ORGANISM: ABNORMAL

## 2025-05-12 NOTE — PROGRESS NOTES
nystatin (MYCOSTATIN) 364017 UNIT/GM powder Apply topically daily Indications: Skin Infection due to Candida Yeast Apply topically 4 times daily. Day shift to skin folds groin, abdomen under breasts      senna (SENOKOT) 8.6 MG tablet 2 tablets by Per G Tube route nightly Indications: Constipation      traZODone (DESYREL) 100 MG tablet 1 tablet by Per G Tube route nightly Indications: Trouble Sleeping       No current facility-administered medications on file prior to visit.         Review of Systems       VS per facility records    Physical Exam   Refer to detailed nursing notes for skin assessment       ASSESSMENT:     Diagnosis Orders   1. Encounter for medication review        2. Moderate episode of recurrent major depressive disorder (HCC)        3. Anxiety            PLAN:  Continue trazodone 100 mg at bedtime, citalopram 40 mg at bedtime.    All other medications as ordered  Consult with psych CNP    Pt/POA agrees with POC     adhere to the JNC VIII guidelines for HTN management and the NCEP ATP III guidelines for LDL-C management.      25\" spent on visit    Pertinent POC, medications, labs, have been reviewed, continue same.  Encourage fluids and good nutrition.  Stress fall prevention strategies.    Electronically signed by: DORIAN Pederson CNP on 5/6/2025    Please note orders entered on site at facility after discussion with appropriate facility nursing/therapy/ / nutritional staff. Current longstanding medical problems and acute medical issues addressed with staff. Available data and data elements in on site paper chart reviewed and analyzed.  Current external consultant notes reviewed in on site chart. Ordered laboratory testing and imaging will be reviewed when available.    Please note this report is partially produced by using speech recognition hardware.  It may contain errors related to the system, including grammar, punctuation and spelling as well as words and phrases that

## 2025-05-13 ENCOUNTER — OFFICE VISIT (OUTPATIENT)
Dept: GERIATRIC MEDICINE | Age: 55
End: 2025-05-13

## 2025-05-13 DIAGNOSIS — N30.01 ACUTE CYSTITIS WITH HEMATURIA: Primary | ICD-10-CM

## 2025-05-14 ENCOUNTER — HOSPITAL ENCOUNTER (OUTPATIENT)
Age: 55
Setting detail: OBSERVATION
Discharge: NURSING, REHAB OR LONG-TERM CARE | End: 2025-05-20
Attending: EMERGENCY MEDICINE
Payer: COMMERCIAL

## 2025-05-14 ENCOUNTER — APPOINTMENT (OUTPATIENT)
Dept: CT IMAGING | Age: 55
End: 2025-05-14
Payer: COMMERCIAL

## 2025-05-14 ENCOUNTER — APPOINTMENT (OUTPATIENT)
Dept: GENERAL RADIOLOGY | Age: 55
End: 2025-05-14
Payer: COMMERCIAL

## 2025-05-14 DIAGNOSIS — N30.01 ACUTE CYSTITIS WITH HEMATURIA: Primary | ICD-10-CM

## 2025-05-14 PROBLEM — T76.91XA: Status: ACTIVE | Noted: 2025-05-14

## 2025-05-14 LAB
ALBUMIN SERPL-MCNC: 3.9 G/DL (ref 3.5–4.6)
ALP SERPL-CCNC: 106 U/L (ref 40–130)
ALT SERPL-CCNC: 14 U/L (ref 0–33)
ANION GAP SERPL CALCULATED.3IONS-SCNC: 11 MEQ/L (ref 9–15)
AST SERPL-CCNC: 21 U/L (ref 0–35)
BACTERIA URNS QL MICRO: NEGATIVE /HPF
BASOPHILS # BLD: 0.1 K/UL (ref 0–0.2)
BASOPHILS NFR BLD: 0.9 %
BILIRUB SERPL-MCNC: 0.9 MG/DL (ref 0.2–0.7)
BILIRUB UR QL STRIP: NEGATIVE
BUN SERPL-MCNC: 8 MG/DL (ref 6–20)
CALCIUM SERPL-MCNC: 9 MG/DL (ref 8.5–9.9)
CHLORIDE SERPL-SCNC: 104 MEQ/L (ref 95–107)
CLARITY UR: CLEAR
CO2 SERPL-SCNC: 24 MEQ/L (ref 20–31)
COLOR UR: ABNORMAL
CREAT SERPL-MCNC: 0.46 MG/DL (ref 0.5–0.9)
EOSINOPHIL # BLD: 0.4 K/UL (ref 0–0.7)
EOSINOPHIL NFR BLD: 5 %
EPI CELLS #/AREA URNS AUTO: ABNORMAL /HPF (ref 0–5)
ERYTHROCYTE [DISTWIDTH] IN BLOOD BY AUTOMATED COUNT: 14.1 % (ref 11.5–14.5)
GLOBULIN SER CALC-MCNC: 2.8 G/DL (ref 2.3–3.5)
GLUCOSE SERPL-MCNC: 116 MG/DL (ref 70–99)
GLUCOSE UR STRIP-MCNC: NEGATIVE MG/DL
HCT VFR BLD AUTO: 41.7 % (ref 37–47)
HGB BLD-MCNC: 13.2 G/DL (ref 12–16)
HGB UR QL STRIP: ABNORMAL
HYALINE CASTS #/AREA URNS AUTO: ABNORMAL /HPF (ref 0–5)
KETONES UR STRIP-MCNC: NEGATIVE MG/DL
LACTIC ACID, SEPSIS: 1.3 MMOL/L (ref 0.5–1.9)
LEUKOCYTE ESTERASE UR QL STRIP: ABNORMAL
LYMPHOCYTES # BLD: 1.2 K/UL (ref 1–4.8)
LYMPHOCYTES NFR BLD: 15.3 %
MAGNESIUM SERPL-MCNC: 1.9 MG/DL (ref 1.7–2.4)
MCH RBC QN AUTO: 28.4 PG (ref 27–31.3)
MCHC RBC AUTO-ENTMCNC: 31.7 % (ref 33–37)
MCV RBC AUTO: 89.7 FL (ref 79.4–94.8)
MONOCYTES # BLD: 0.4 K/UL (ref 0.2–0.8)
MONOCYTES NFR BLD: 4.6 %
NEUTROPHILS # BLD: 5.6 K/UL (ref 1.4–6.5)
NEUTS SEG NFR BLD: 73.9 %
NITRITE UR QL STRIP: NEGATIVE
PH UR STRIP: 7.5 [PH] (ref 5–9)
PLATELET # BLD AUTO: 290 K/UL (ref 130–400)
POTASSIUM SERPL-SCNC: 4.5 MEQ/L (ref 3.4–4.9)
PROCALCITONIN SERPL IA-MCNC: <0.02 NG/ML (ref 0–0.15)
PROT SERPL-MCNC: 6.7 G/DL (ref 6.3–8)
PROT UR STRIP-MCNC: 30 MG/DL
RBC # BLD AUTO: 4.65 M/UL (ref 4.2–5.4)
RBC #/AREA URNS AUTO: ABNORMAL /HPF (ref 0–5)
SARS-COV-2 RDRP RESP QL NAA+PROBE: NOT DETECTED
SODIUM SERPL-SCNC: 139 MEQ/L (ref 135–144)
SP GR UR STRIP: 1 (ref 1–1.03)
URINE REFLEX TO CULTURE: YES
UROBILINOGEN UR STRIP-ACNC: 1 E.U./DL
WBC # BLD AUTO: 7.6 K/UL (ref 4.8–10.8)
WBC #/AREA URNS AUTO: ABNORMAL /HPF (ref 0–5)

## 2025-05-14 PROCEDURE — 96372 THER/PROPH/DIAG INJ SC/IM: CPT

## 2025-05-14 PROCEDURE — 87040 BLOOD CULTURE FOR BACTERIA: CPT

## 2025-05-14 PROCEDURE — 80053 COMPREHEN METABOLIC PANEL: CPT

## 2025-05-14 PROCEDURE — 81001 URINALYSIS AUTO W/SCOPE: CPT

## 2025-05-14 PROCEDURE — 99285 EMERGENCY DEPT VISIT HI MDM: CPT

## 2025-05-14 PROCEDURE — 85025 COMPLETE CBC W/AUTO DIFF WBC: CPT

## 2025-05-14 PROCEDURE — 83605 ASSAY OF LACTIC ACID: CPT

## 2025-05-14 PROCEDURE — 2500000003 HC RX 250 WO HCPCS: Performed by: STUDENT IN AN ORGANIZED HEALTH CARE EDUCATION/TRAINING PROGRAM

## 2025-05-14 PROCEDURE — 93005 ELECTROCARDIOGRAM TRACING: CPT | Performed by: EMERGENCY MEDICINE

## 2025-05-14 PROCEDURE — 96367 TX/PROPH/DG ADDL SEQ IV INF: CPT

## 2025-05-14 PROCEDURE — 2580000003 HC RX 258: Performed by: STUDENT IN AN ORGANIZED HEALTH CARE EDUCATION/TRAINING PROGRAM

## 2025-05-14 PROCEDURE — 83735 ASSAY OF MAGNESIUM: CPT

## 2025-05-14 PROCEDURE — 96365 THER/PROPH/DIAG IV INF INIT: CPT

## 2025-05-14 PROCEDURE — G0378 HOSPITAL OBSERVATION PER HR: HCPCS

## 2025-05-14 PROCEDURE — 71045 X-RAY EXAM CHEST 1 VIEW: CPT

## 2025-05-14 PROCEDURE — 6360000004 HC RX CONTRAST MEDICATION: Performed by: EMERGENCY MEDICINE

## 2025-05-14 PROCEDURE — 2580000003 HC RX 258: Performed by: EMERGENCY MEDICINE

## 2025-05-14 PROCEDURE — 87086 URINE CULTURE/COLONY COUNT: CPT

## 2025-05-14 PROCEDURE — 36415 COLL VENOUS BLD VENIPUNCTURE: CPT

## 2025-05-14 PROCEDURE — 84145 PROCALCITONIN (PCT): CPT

## 2025-05-14 PROCEDURE — 6360000002 HC RX W HCPCS: Performed by: STUDENT IN AN ORGANIZED HEALTH CARE EDUCATION/TRAINING PROGRAM

## 2025-05-14 PROCEDURE — 74177 CT ABD & PELVIS W/CONTRAST: CPT

## 2025-05-14 PROCEDURE — 87635 SARS-COV-2 COVID-19 AMP PRB: CPT

## 2025-05-14 PROCEDURE — 6360000002 HC RX W HCPCS: Performed by: EMERGENCY MEDICINE

## 2025-05-14 RX ORDER — BISACODYL 10 MG
10 SUPPOSITORY, RECTAL RECTAL SEE ADMIN INSTRUCTIONS
COMMUNITY

## 2025-05-14 RX ORDER — ENOXAPARIN SODIUM 100 MG/ML
40 INJECTION SUBCUTANEOUS DAILY
Status: DISCONTINUED | OUTPATIENT
Start: 2025-05-14 | End: 2025-05-20 | Stop reason: HOSPADM

## 2025-05-14 RX ORDER — ACETAMINOPHEN 650 MG/1
650 SUPPOSITORY RECTAL EVERY 6 HOURS PRN
Status: DISCONTINUED | OUTPATIENT
Start: 2025-05-14 | End: 2025-05-20 | Stop reason: HOSPADM

## 2025-05-14 RX ORDER — POTASSIUM CHLORIDE 1500 MG/1
40 TABLET, EXTENDED RELEASE ORAL PRN
Status: DISCONTINUED | OUTPATIENT
Start: 2025-05-14 | End: 2025-05-20 | Stop reason: HOSPADM

## 2025-05-14 RX ORDER — POLYETHYLENE GLYCOL 3350 17 G/17G
17 POWDER, FOR SOLUTION ORAL DAILY PRN
Status: DISCONTINUED | OUTPATIENT
Start: 2025-05-14 | End: 2025-05-20 | Stop reason: HOSPADM

## 2025-05-14 RX ORDER — SODIUM CHLORIDE 0.9 % (FLUSH) 0.9 %
5-40 SYRINGE (ML) INJECTION PRN
Status: DISCONTINUED | OUTPATIENT
Start: 2025-05-14 | End: 2025-05-20 | Stop reason: HOSPADM

## 2025-05-14 RX ORDER — IOPAMIDOL 755 MG/ML
75 INJECTION, SOLUTION INTRAVASCULAR
Status: COMPLETED | OUTPATIENT
Start: 2025-05-14 | End: 2025-05-14

## 2025-05-14 RX ORDER — ACETAMINOPHEN 325 MG/1
650 TABLET ORAL EVERY 6 HOURS PRN
Status: DISCONTINUED | OUTPATIENT
Start: 2025-05-14 | End: 2025-05-20 | Stop reason: HOSPADM

## 2025-05-14 RX ORDER — CYCLOBENZAPRINE HCL 5 MG
5 TABLET ORAL 2 TIMES DAILY PRN
COMMUNITY

## 2025-05-14 RX ORDER — ONDANSETRON 2 MG/ML
4 INJECTION INTRAMUSCULAR; INTRAVENOUS EVERY 6 HOURS PRN
Status: DISCONTINUED | OUTPATIENT
Start: 2025-05-14 | End: 2025-05-20 | Stop reason: HOSPADM

## 2025-05-14 RX ORDER — ACETAMINOPHEN 325 MG/1
650 TABLET ORAL EVERY 4 HOURS PRN
COMMUNITY

## 2025-05-14 RX ORDER — ONDANSETRON 4 MG/1
4 TABLET, ORALLY DISINTEGRATING ORAL EVERY 8 HOURS PRN
Status: DISCONTINUED | OUTPATIENT
Start: 2025-05-14 | End: 2025-05-20 | Stop reason: HOSPADM

## 2025-05-14 RX ORDER — SODIUM CHLORIDE 0.9 % (FLUSH) 0.9 %
5-40 SYRINGE (ML) INJECTION EVERY 12 HOURS SCHEDULED
Status: DISCONTINUED | OUTPATIENT
Start: 2025-05-14 | End: 2025-05-20 | Stop reason: HOSPADM

## 2025-05-14 RX ORDER — POTASSIUM CHLORIDE 7.45 MG/ML
10 INJECTION INTRAVENOUS PRN
Status: DISCONTINUED | OUTPATIENT
Start: 2025-05-14 | End: 2025-05-20 | Stop reason: HOSPADM

## 2025-05-14 RX ORDER — SODIUM CHLORIDE 9 MG/ML
INJECTION, SOLUTION INTRAVENOUS PRN
Status: DISCONTINUED | OUTPATIENT
Start: 2025-05-14 | End: 2025-05-20 | Stop reason: HOSPADM

## 2025-05-14 RX ORDER — MAGNESIUM SULFATE IN WATER 40 MG/ML
2000 INJECTION, SOLUTION INTRAVENOUS PRN
Status: DISCONTINUED | OUTPATIENT
Start: 2025-05-14 | End: 2025-05-20 | Stop reason: HOSPADM

## 2025-05-14 RX ADMIN — CEFEPIME 2000 MG: 2 INJECTION, POWDER, FOR SOLUTION INTRAVENOUS at 20:27

## 2025-05-14 RX ADMIN — SODIUM CHLORIDE, PRESERVATIVE FREE 10 ML: 5 INJECTION INTRAVENOUS at 21:46

## 2025-05-14 RX ADMIN — ENOXAPARIN SODIUM 40 MG: 100 INJECTION SUBCUTANEOUS at 20:23

## 2025-05-14 RX ADMIN — IOPAMIDOL 75 ML: 755 INJECTION, SOLUTION INTRAVENOUS at 13:20

## 2025-05-14 RX ADMIN — PIPERACILLIN AND TAZOBACTAM 4500 MG: 4; .5 INJECTION, POWDER, LYOPHILIZED, FOR SOLUTION INTRAVENOUS at 11:23

## 2025-05-14 ASSESSMENT — PAIN DESCRIPTION - DESCRIPTORS: DESCRIPTORS: BURNING;DISCOMFORT

## 2025-05-14 ASSESSMENT — PAIN SCALES - GENERAL: PAINLEVEL_OUTOF10: 2

## 2025-05-14 ASSESSMENT — PAIN - FUNCTIONAL ASSESSMENT: PAIN_FUNCTIONAL_ASSESSMENT: 0-10

## 2025-05-14 ASSESSMENT — PAIN DESCRIPTION - LOCATION: LOCATION: ABDOMEN;PELVIS

## 2025-05-14 NOTE — CARE COORDINATION
Dr Espinosa spoke with Dr Phan the hospitalist and the patient will be admitted. The patient was informed and stated her understanding.
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
## 2025-05-14 NOTE — ACP (ADVANCE CARE PLANNING)
Advance Care Planning     Advance Care Planning Activator (Inpatient)  Conversation Note      Date of ACP Conversation: 5/14/2025     Conversation Conducted with: Patient with Decision Making Capacity    ACP Activator: Marium Alexis, RN        Health Care Decision Maker:     Current Designated Health Care Decision Maker:     Primary Decision Maker: Bertha Osorio - [facility]UST - [phone]
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
## 2025-05-14 NOTE — DISCHARGE INSTRUCTIONS
Follow up with primary care physician in the next 7 days or sooner if needed. If you do not have a Primary care physician, please schedule an appointment with one. Please ask prior to discharge about a list of local providers.     Please return to ER or call 911 if you develop any significant signs or symptoms.    I may not have addressed all of your medical illnesses or the abnormal blood work or imaging therefore please ask your PCP to obtain [facility] record to follow up on all of the abnormal labs, imaging and findings that I have and have not addressed during your hospitalization.     Discharging you from the hospital does not mean that your medical care ends here and now. You may still need additional work up, investigation, monitoring, and treatment to be handled from this point on by outside providers including your PCP, Specialists and other healthcare providers.     For medication questions, contact your retail pharmacy and your PCP.    Your medical team at [facility] appreciates the opportunity to work with you to get well!    Layla Lauren, DO  1:09 PM

## 2025-05-14 NOTE — ED PROVIDER NOTES
[facility] EMERGENCY DEPARTMENT  EMERGENCY DEPARTMENT ENCOUNTER      Pt Name: Frances Giron  MRN: [id number]  Birthdate 1970  Date of evaluation: 5/14/2025  Provider: Andrew Espinosa DO  10:24 AM EDT    CHIEF COMPLAINT       Chief Complaint   Patient presents with    Urinary Tract Infection         HISTORY OF PRESENT ILLNESS   (Location/Symptom, Timing/Onset, Context/Setting, Quality, Duration, Modifying Factors, Severity)  Note limiting factors.   Frances Giron is a 54 y.o. female who presents to the emergency department for UTI. Patient is from nursing home ([facility]). Patient was diagnosed with UTI 5 days ago. Patient was complaining of dysuria and frequency. Patient was placed on Macrobid for 3 days, and then switched to Cefepime secondary to the culture being positive for Pseudomonas.     HPI    Nursing Notes were reviewed.    REVIEW OF SYSTEMS    (2-9 systems for level 4, 10 or more for level 5)     Review of Systems   Constitutional:  Negative for chills and fever.   HENT:  Negative for ear pain and sinus pain.    Eyes:  Negative for pain and redness.   Respiratory:  Negative for cough and shortness of breath.    Cardiovascular:  Negative for chest pain.   Gastrointestinal:  Positive for abdominal pain and nausea. Negative for vomiting.   Genitourinary:  Positive for dysuria, flank pain and frequency.   Musculoskeletal:  Negative for back pain.   Skin:  Negative for rash.   Neurological:  Negative for dizziness and headaches.   Psychiatric/Behavioral:  Negative for confusion. The patient is not nervous/anxious.        Except as noted above the remainder of the review of systems was reviewed and negative.       PAST MEDICAL HISTORY     Past Medical History:   Diagnosis Date    Anxiety and depression     Cerebral artery occlusion with cerebral infarction (HCC)     COPD (chronic obstructive pulmonary disease) (HCC)     GERD (gastroesophageal reflux disease)     Hypertension     PTSD (post-traumatic

## 2025-05-14 NOTE — ED TRIAGE NOTES
Patient diagnosed with UTI last week. Was placed on abx yesterday. Did not take it today. Family felt that she was not being properly taken care of so had her sent here. Patient states that she does have pain when urinating.

## 2025-05-14 NOTE — PROGRESS NOTES
[address] [place] [address]    2025    Frances Giron  is a 54 y.o. in the  being seen for    Chief Complaint   Patient presents with    Follow-up       Remains [facility] for long-term care.  Patient sitting in chair at time of visit.  Patient reports increased urinary frequency and discomfort with urination.  UA was negative.  Patient has agreed to allow straight cath x 1 to obtain a  sample.  She denies hematuria, back pain, fever, chills, urgency.  Patient is currently on Pyridium which she states has not been helping her symptoms          Past Medical History:   Diagnosis Date    Anxiety and depression     Cerebral artery occlusion with cerebral infarction (HCC)     COPD (chronic obstructive pulmonary disease) (HCC)     GERD (gastroesophageal reflux disease)     Hypertension     PTSD (post-traumatic stress disorder)      Past Surgical History:   Procedure Laterality Date     SECTION      x3    NASAL SINUS SURGERY      TONSILLECTOMY      TUBAL LIGATION      x2, first attempt failed     Family History   Problem Relation Age of Onset    Cancer Sister         Uterine    Cancer Sister         Uterine    Cancer Sister         Uterine    Cancer Sister         Uterine     Social History     Socioeconomic History    Marital status: Single     Spouse name: Not on file    Number of children: Not on file    Years of education: Not on file    Highest education level: Not on file   Occupational History    Not on file   Tobacco Use    Smoking status: Never     Passive exposure: Never    Smokeless tobacco: Never   Vaping Use    Vaping status: Never Used   Substance and Sexual Activity    Alcohol use: Not Currently    Drug use: Never    Sexual activity: Not on file   Other Topics Concern    Not on file   Social History Narrative    Not on file     Social Drivers of Health     Financial Resource Strain: Low Risk  (2023)    Received from [facility]

## 2025-05-14 NOTE — H&P
HISTORY AND PHYSICAL             Date: 2025        Patient Name: Frances Giron     YOB: 1970      Age:  54 y.o.    Chief Complaint     Chief Complaint   Patient presents with    Urinary Tract Infection          History Obtained From   patient, electronic medical record    History of Present Illness   Patient is a 54-year-old female with a past medical history of COPD, CVA, hypertension, GERD, PTSD, anxiety, and depression who presents ED for UTI.  Patient complains of difficulty with urination and frequency.  She denies fever or chills.  Patient is from [facility].  She was recently diagnosed with UTI 5 days ago patient was taking Macrobid x 3 days switched to cefepime secondary to cultures positive for Pseudomonas.  Urinalysis positive for moderate amount of leukocytes.  Large amount of blood.  CT of the abdomen consistent with cystitis no evidence of polynephritis or obstructive uropathy.  Bilateral nephrolithiasis.  Cholelithiasis.  See full report.  CT of the chest may reveal atelectasis versus developing pneumonia.  See full report.  Patient given cefepime 2000 mg IV Zosyn 4500 mg and IVFs in ED.    Past Medical History     Past Medical History:   Diagnosis Date    Anxiety and depression     Cerebral artery occlusion with cerebral infarction (HCC)     COPD (chronic obstructive pulmonary disease) (HCC)     GERD (gastroesophageal reflux disease)     Hypertension     PTSD (post-traumatic stress disorder)         Past Surgical History     Past Surgical History:   Procedure Laterality Date     SECTION      x3    NASAL SINUS SURGERY      TONSILLECTOMY      TUBAL LIGATION      x2, first attempt failed        Medications Prior to Admission     Prior to Admission medications    Medication Sig Start Date End Date Taking? Authorizing Provider   bisacodyl (DULCOLAX) 10 MG suppository Place 1 suppository rectally See Admin Instructions In the morning on Mon, Wed, Fri for constipation

## 2025-05-14 NOTE — CARE COORDINATION
This nurse received a call from [facility]. They don't have any bed availability at this time. This nurse had provided the patient with some honey thick orange juice earlier that she tolerated well.
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
## 2025-05-14 NOTE — CARE COORDINATION
Per Dr Espinosa, she will be d/c the patient on Cefepime 1 gm iv q 12 hr. Information faxed to [facility].

## 2025-05-15 LAB
BACTERIA UR CULT: NORMAL
EKG ATRIAL RATE: 84 BPM
EKG DIAGNOSIS: NORMAL
EKG P AXIS: 19 DEGREES
EKG P-R INTERVAL: 180 MS
EKG Q-T INTERVAL: 378 MS
EKG QRS DURATION: 70 MS
EKG QTC CALCULATION (BAZETT): 446 MS
EKG R AXIS: -30 DEGREES
EKG T AXIS: 19 DEGREES
EKG VENTRICULAR RATE: 84 BPM

## 2025-05-15 PROCEDURE — 97162 PT EVAL MOD COMPLEX 30 MIN: CPT

## 2025-05-15 PROCEDURE — 6360000002 HC RX W HCPCS: Performed by: STUDENT IN AN ORGANIZED HEALTH CARE EDUCATION/TRAINING PROGRAM

## 2025-05-15 PROCEDURE — 2580000003 HC RX 258: Performed by: STUDENT IN AN ORGANIZED HEALTH CARE EDUCATION/TRAINING PROGRAM

## 2025-05-15 PROCEDURE — 96372 THER/PROPH/DIAG INJ SC/IM: CPT

## 2025-05-15 PROCEDURE — 2500000003 HC RX 250 WO HCPCS: Performed by: STUDENT IN AN ORGANIZED HEALTH CARE EDUCATION/TRAINING PROGRAM

## 2025-05-15 PROCEDURE — 96366 THER/PROPH/DIAG IV INF ADDON: CPT

## 2025-05-15 PROCEDURE — 6370000000 HC RX 637 (ALT 250 FOR IP): Performed by: STUDENT IN AN ORGANIZED HEALTH CARE EDUCATION/TRAINING PROGRAM

## 2025-05-15 PROCEDURE — 97166 OT EVAL MOD COMPLEX 45 MIN: CPT

## 2025-05-15 PROCEDURE — G0378 HOSPITAL OBSERVATION PER HR: HCPCS

## 2025-05-15 PROCEDURE — 92610 EVALUATE SWALLOWING FUNCTION: CPT

## 2025-05-15 RX ORDER — GABAPENTIN 250 MG/5ML
250 SOLUTION ORAL NIGHTLY
Status: DISCONTINUED | OUTPATIENT
Start: 2025-05-15 | End: 2025-05-20 | Stop reason: HOSPADM

## 2025-05-15 RX ORDER — SENNOSIDES A AND B 8.6 MG/1
2 TABLET, FILM COATED ORAL NIGHTLY
Status: DISCONTINUED | OUTPATIENT
Start: 2025-05-15 | End: 2025-05-20 | Stop reason: HOSPADM

## 2025-05-15 RX ORDER — TRAZODONE HYDROCHLORIDE 50 MG/1
100 TABLET ORAL NIGHTLY
Status: DISCONTINUED | OUTPATIENT
Start: 2025-05-15 | End: 2025-05-20 | Stop reason: HOSPADM

## 2025-05-15 RX ORDER — BACLOFEN 20 MG/1
20 TABLET ORAL 3 TIMES DAILY
Status: DISCONTINUED | OUTPATIENT
Start: 2025-05-15 | End: 2025-05-20 | Stop reason: HOSPADM

## 2025-05-15 RX ORDER — ASPIRIN 81 MG/1
81 TABLET, CHEWABLE ORAL DAILY
Status: DISCONTINUED | OUTPATIENT
Start: 2025-05-15 | End: 2025-05-20 | Stop reason: HOSPADM

## 2025-05-15 RX ORDER — BACLOFEN 20 MG/1
20 TABLET ORAL 3 TIMES DAILY
Status: DISCONTINUED | OUTPATIENT
Start: 2025-05-15 | End: 2025-05-15

## 2025-05-15 RX ORDER — BISACODYL 10 MG
10 SUPPOSITORY, RECTAL RECTAL DAILY PRN
Status: DISCONTINUED | OUTPATIENT
Start: 2025-05-15 | End: 2025-05-20 | Stop reason: HOSPADM

## 2025-05-15 RX ORDER — MONTELUKAST SODIUM 10 MG/1
10 TABLET ORAL EVERY MORNING
Status: DISCONTINUED | OUTPATIENT
Start: 2025-05-15 | End: 2025-05-20 | Stop reason: HOSPADM

## 2025-05-15 RX ORDER — CYCLOBENZAPRINE HCL 5 MG
5 TABLET ORAL 2 TIMES DAILY PRN
Status: DISCONTINUED | OUTPATIENT
Start: 2025-05-15 | End: 2025-05-20 | Stop reason: HOSPADM

## 2025-05-15 RX ORDER — ATORVASTATIN CALCIUM 80 MG/1
80 TABLET, FILM COATED ORAL NIGHTLY
Status: DISCONTINUED | OUTPATIENT
Start: 2025-05-15 | End: 2025-05-20 | Stop reason: HOSPADM

## 2025-05-15 RX ORDER — FAMOTIDINE 20 MG/1
20 TABLET, FILM COATED ORAL NIGHTLY
Status: DISCONTINUED | OUTPATIENT
Start: 2025-05-15 | End: 2025-05-20 | Stop reason: HOSPADM

## 2025-05-15 RX ORDER — CITALOPRAM HYDROBROMIDE 10 MG/1
40 TABLET ORAL NIGHTLY
Status: DISCONTINUED | OUTPATIENT
Start: 2025-05-15 | End: 2025-05-20 | Stop reason: HOSPADM

## 2025-05-15 RX ADMIN — GABAPENTIN 250 MG: 250 SOLUTION ORAL at 20:53

## 2025-05-15 RX ADMIN — SENNOSIDES 17.2 MG: 8.6 TABLET, FILM COATED ORAL at 20:53

## 2025-05-15 RX ADMIN — CEFEPIME 2000 MG: 2 INJECTION, POWDER, FOR SOLUTION INTRAVENOUS at 04:07

## 2025-05-15 RX ADMIN — ATORVASTATIN CALCIUM 80 MG: 80 TABLET, FILM COATED ORAL at 20:53

## 2025-05-15 RX ADMIN — SODIUM CHLORIDE, PRESERVATIVE FREE 10 ML: 5 INJECTION INTRAVENOUS at 08:49

## 2025-05-15 RX ADMIN — CEFEPIME 2000 MG: 2 INJECTION, POWDER, FOR SOLUTION INTRAVENOUS at 15:46

## 2025-05-15 RX ADMIN — MONTELUKAST 10 MG: 10 TABLET, FILM COATED ORAL at 11:38

## 2025-05-15 RX ADMIN — FAMOTIDINE 20 MG: 20 TABLET, FILM COATED ORAL at 20:53

## 2025-05-15 RX ADMIN — BACLOFEN 20 MG: 20 TABLET ORAL at 11:38

## 2025-05-15 RX ADMIN — ASPIRIN 81 MG: 81 TABLET, CHEWABLE ORAL at 11:38

## 2025-05-15 RX ADMIN — ENOXAPARIN SODIUM 40 MG: 100 INJECTION SUBCUTANEOUS at 08:48

## 2025-05-15 RX ADMIN — CITALOPRAM HYDROBROMIDE 40 MG: 10 TABLET ORAL at 20:52

## 2025-05-15 RX ADMIN — TRAZODONE HYDROCHLORIDE 100 MG: 50 TABLET ORAL at 20:53

## 2025-05-15 RX ADMIN — SODIUM CHLORIDE, PRESERVATIVE FREE 10 ML: 5 INJECTION INTRAVENOUS at 20:52

## 2025-05-15 RX ADMIN — BACLOFEN 20 MG: 20 TABLET ORAL at 20:53

## 2025-05-15 ASSESSMENT — PAIN SCALES - GENERAL
PAINLEVEL_OUTOF10: 0
PAINLEVEL_OUTOF10: 0

## 2025-05-15 NOTE — CARE COORDINATION
This LSW visited patient at bedside this am.  I completed Social Work consult for placement. Patient and I discussed discharge plans.  Per patient request I called to give referral to  to [facility].   I left detailed voicemail message for [facility] admissions coordinator at 10:20am.  Awaiting return call at this time.  Patients 2nd SNF choice is: [facility]. I did call and gave Marielena admissions liaison referral.  Awaiting response at this time.   Patients 3rd SNF choice is: [facility].  I called and gave Ambika admissions liasion at [facility] patient referral.  Awaiting a response at this time.  Electronically signed by SANTAIGO Tidwell on 5/15/25 at 10:18 AM EDT    This LSW received call that patients referral has been denied at [facility] and at [facility].  Electronically signed by SANTIAGO Tidwell on 5/15/25 at 3:23 PM EDT   This LSW called and left voicemail message for admissions coordinator at [facility] at 3:25pm. Awaiting a return call at this time and will inquire if patients referral is accepted.  Per patient request  I also called Lisaadmissions liaison for [facility], at 3:30pm. I left voicemail message requesting a return call. Awaiting a return call at this time and will give her referral at this time.
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
## 2025-05-15 NOTE — PROGRESS NOTES
Patient Declined (3/14/2025)    Hunger Vital Sign     Worried About Running Out of Food in the Last Year: Patient declined     Ran Out of Food in the Last Year: Patient declined   Transportation Needs: Patient Declined (3/14/2025)    PRAPARE - Transportation     Lack of Transportation (Medical): Patient declined     Lack of Transportation (Non-Medical): Patient declined   Physical Activity: Inactive (1/7/2025)    Exercise Vital Sign     Days of Exercise per Week: 0 days     Minutes of Exercise per Session: 0 min   Stress: Stress Concern Present (11/22/2023)    Received from [facility] - Occupational Stress Questionnaire     Feeling of Stress : To some extent   Social Connections: Socially Isolated (11/22/2023)    Received from [facility]    Social Connection and Isolation Panel [NHANES]     Frequency of Communication with Friends and Family: More than three times a week     Frequency of Social Gatherings with Friends and Family: More than three times a week     Attends Hoahaoism Services: Never     Active Member of Clubs or Organizations: No     Attends Club or Organization Meetings: Never     Marital Status:    Intimate Partner Violence: Not At Risk (11/22/2023)    Received from [facility]    Humiliation, Afraid, Rape, and Kick questionnaire     Fear of Current or Ex-Partner: No     Emotionally Abused: No     Physically Abused: No     Sexually Abused: No   Housing Stability: Patient Declined (3/14/2025)    Housing Stability Vital Sign     Unable to Pay for Housing in the Last Year: Patient declined     Number of Times Moved in the Last Year: 0     Homeless in the Last Year: Patient declined       Allergies: Bactrim [sulfamethoxazole-trimethoprim], Xanax [alprazolam], Levofloxacin, Lisinopril, Sulfa antibiotics, and Tramadol  NF MEDICATIONS REVIEWED AND ALLERGIES REVIEWED IN NF CHART  No current

## 2025-05-15 NOTE — PLAN OF CARE
Problem: Chronic Conditions and Co-morbidities  Goal: Patient's chronic conditions and co-morbidity symptoms are monitored and maintained or improved  Outcome: Progressing     Problem: Discharge Planning  Goal: Discharge to home or other facility with appropriate resources  Outcome: Progressing  Flowsheets (Taken 5/14/2025 2040)  Discharge to home or other facility with appropriate resources:   Identify barriers to discharge with patient and caregiver   Identify discharge learning needs (meds, wound care, etc)   Refer to discharge planning if patient needs post-hospital services based on physician order or complex needs related to functional status, cognitive ability or social support system   Arrange for needed discharge resources and transportation as appropriate     Problem: Pain  Goal: Verbalizes/displays adequate comfort level or baseline comfort level  Outcome: Progressing     Problem: Skin/Tissue Integrity  Goal: Skin integrity remains intact  Description: 1.  Monitor for areas of redness and/or skin breakdown2.  Assess vascular access sites hourly3.  Every 4-6 hours minimum:  Change oxygen saturation probe site4.  Every 4-6 hours:  If on nasal continuous positive airway pressure, respiratory therapy assess nares and determine need for appliance change or resting period  Outcome: Progressing

## 2025-05-16 PROCEDURE — 2580000003 HC RX 258: Performed by: STUDENT IN AN ORGANIZED HEALTH CARE EDUCATION/TRAINING PROGRAM

## 2025-05-16 PROCEDURE — G0378 HOSPITAL OBSERVATION PER HR: HCPCS

## 2025-05-16 PROCEDURE — 96372 THER/PROPH/DIAG INJ SC/IM: CPT

## 2025-05-16 PROCEDURE — 2500000003 HC RX 250 WO HCPCS: Performed by: STUDENT IN AN ORGANIZED HEALTH CARE EDUCATION/TRAINING PROGRAM

## 2025-05-16 PROCEDURE — 6370000000 HC RX 637 (ALT 250 FOR IP): Performed by: FAMILY MEDICINE

## 2025-05-16 PROCEDURE — 6360000002 HC RX W HCPCS: Performed by: STUDENT IN AN ORGANIZED HEALTH CARE EDUCATION/TRAINING PROGRAM

## 2025-05-16 PROCEDURE — 6370000000 HC RX 637 (ALT 250 FOR IP): Performed by: STUDENT IN AN ORGANIZED HEALTH CARE EDUCATION/TRAINING PROGRAM

## 2025-05-16 PROCEDURE — 96366 THER/PROPH/DIAG IV INF ADDON: CPT

## 2025-05-16 RX ORDER — BUSPIRONE HYDROCHLORIDE 5 MG/1
5 TABLET ORAL 2 TIMES DAILY
Status: DISCONTINUED | OUTPATIENT
Start: 2025-05-16 | End: 2025-05-20 | Stop reason: HOSPADM

## 2025-05-16 RX ADMIN — MONTELUKAST 10 MG: 10 TABLET, FILM COATED ORAL at 10:00

## 2025-05-16 RX ADMIN — BACLOFEN 20 MG: 20 TABLET ORAL at 10:00

## 2025-05-16 RX ADMIN — SENNOSIDES 17.2 MG: 8.6 TABLET, FILM COATED ORAL at 21:08

## 2025-05-16 RX ADMIN — ENOXAPARIN SODIUM 40 MG: 100 INJECTION SUBCUTANEOUS at 09:59

## 2025-05-16 RX ADMIN — BACLOFEN 20 MG: 20 TABLET ORAL at 21:08

## 2025-05-16 RX ADMIN — SODIUM CHLORIDE, PRESERVATIVE FREE 10 ML: 5 INJECTION INTRAVENOUS at 21:08

## 2025-05-16 RX ADMIN — BUSPIRONE HYDROCHLORIDE 5 MG: 5 TABLET ORAL at 21:08

## 2025-05-16 RX ADMIN — FAMOTIDINE 20 MG: 20 TABLET, FILM COATED ORAL at 21:07

## 2025-05-16 RX ADMIN — CEFEPIME 2000 MG: 2 INJECTION, POWDER, FOR SOLUTION INTRAVENOUS at 04:09

## 2025-05-16 RX ADMIN — BACLOFEN 20 MG: 20 TABLET ORAL at 14:34

## 2025-05-16 RX ADMIN — TRAZODONE HYDROCHLORIDE 100 MG: 50 TABLET ORAL at 21:07

## 2025-05-16 RX ADMIN — CITALOPRAM HYDROBROMIDE 40 MG: 10 TABLET ORAL at 21:07

## 2025-05-16 RX ADMIN — ATORVASTATIN CALCIUM 80 MG: 80 TABLET, FILM COATED ORAL at 21:07

## 2025-05-16 RX ADMIN — CEFEPIME 2000 MG: 2 INJECTION, POWDER, FOR SOLUTION INTRAVENOUS at 16:04

## 2025-05-16 RX ADMIN — BISACODYL 10 MG: 10 SUPPOSITORY RECTAL at 10:03

## 2025-05-16 RX ADMIN — ASPIRIN 81 MG: 81 TABLET, CHEWABLE ORAL at 09:59

## 2025-05-16 RX ADMIN — SODIUM CHLORIDE, PRESERVATIVE FREE 10 ML: 5 INJECTION INTRAVENOUS at 10:04

## 2025-05-16 RX ADMIN — GABAPENTIN 250 MG: 250 SOLUTION ORAL at 21:08

## 2025-05-16 ASSESSMENT — PAIN DESCRIPTION - ORIENTATION: ORIENTATION: MID;RIGHT;LEFT

## 2025-05-16 ASSESSMENT — PAIN SCALES - GENERAL
PAINLEVEL_OUTOF10: 5
PAINLEVEL_OUTOF10: 0

## 2025-05-16 ASSESSMENT — PAIN DESCRIPTION - LOCATION: LOCATION: PELVIS

## 2025-05-16 NOTE — PLAN OF CARE
Problem: Chronic Conditions and Co-morbidities  Goal: Patient's chronic conditions and co-morbidity symptoms are monitored and maintained or improved  5/15/2025 2145 by Dilia Freitas RN  Outcome: Progressing  5/15/2025 0927 by Jeronimo Rivera RN  Outcome: Progressing     Problem: Discharge Planning  Goal: Discharge to home or other facility with appropriate resources  5/15/2025 2145 by Dilia Freitas RN  Outcome: Progressing  5/15/2025 0927 by Jeronimo Rivera RN  Outcome: Progressing     Problem: Pain  Goal: Verbalizes/displays adequate comfort level or baseline comfort level  5/15/2025 2145 by Dilia Freitas RN  Outcome: Progressing  5/15/2025 0927 by Jeronimo Rivera RN  Outcome: Progressing     Problem: Skin/Tissue Integrity  Goal: Skin integrity remains intact  Description: 1.  Monitor for areas of redness and/or skin breakdown2.  Assess vascular access sites hourly3.  Every 4-6 hours minimum:  Change oxygen saturation probe site4.  Every 4-6 hours:  If on nasal continuous positive airway pressure, respiratory therapy assess nares and determine need for appliance change or resting period  5/15/2025 2145 by Dilia Freitas RN  Outcome: Progressing  5/15/2025 0927 by Jeronimo Rivera RN  Outcome: Progressing  Flowsheets (Taken 5/15/2025 0923)  Skin Integrity Remains Intact: Monitor for areas of redness and/or skin breakdown     Problem: Safety - Adult  Goal: Free from fall injury  Outcome: Progressing     Problem: SLP Adult - Impaired Swallowing  Goal: By Discharge: Advance to least restrictive diet without signs or symptoms of aspiration for planned discharge setting.  See evaluation for individualized goals.  5/15/2025 1123 by Tricia Shafer, SLP  Outcome: Adequate for Discharge

## 2025-05-16 NOTE — CARE COORDINATION
This LSW visited patient at bedside this am. I notified patient that I received notification from Yudi at [facility] that patients referral has been denied.  I also notified patient that I have faxed referral to [facility] ([phone])at 10:30am.  Awaiting response at this time.  Electronically signed by SANTIAGO Tidwell on 5/16/25 at 10:34 AM EDT   This LSW was notified by cece Moyer liaison for [facility]  that patients referral has been accepted.  Insurance authorization for SNF transfer was initiated today, May 16,2025.  Patient will transfer to [facility] once insurance authorization is obtained. I notified patient at bedside. I also called and notified patients daughter, Bertha.  Electronically signed by SANTIAGO Tidwell on 5/16/25 at 3:12 PM EDT
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
## 2025-05-17 PROCEDURE — G0378 HOSPITAL OBSERVATION PER HR: HCPCS

## 2025-05-17 PROCEDURE — 96366 THER/PROPH/DIAG IV INF ADDON: CPT

## 2025-05-17 PROCEDURE — 6360000002 HC RX W HCPCS: Performed by: STUDENT IN AN ORGANIZED HEALTH CARE EDUCATION/TRAINING PROGRAM

## 2025-05-17 PROCEDURE — 2580000003 HC RX 258: Performed by: STUDENT IN AN ORGANIZED HEALTH CARE EDUCATION/TRAINING PROGRAM

## 2025-05-17 PROCEDURE — 6370000000 HC RX 637 (ALT 250 FOR IP): Performed by: STUDENT IN AN ORGANIZED HEALTH CARE EDUCATION/TRAINING PROGRAM

## 2025-05-17 PROCEDURE — 2500000003 HC RX 250 WO HCPCS: Performed by: STUDENT IN AN ORGANIZED HEALTH CARE EDUCATION/TRAINING PROGRAM

## 2025-05-17 PROCEDURE — 6370000000 HC RX 637 (ALT 250 FOR IP): Performed by: FAMILY MEDICINE

## 2025-05-17 RX ADMIN — SENNOSIDES 17.2 MG: 8.6 TABLET, FILM COATED ORAL at 21:34

## 2025-05-17 RX ADMIN — FAMOTIDINE 20 MG: 20 TABLET, FILM COATED ORAL at 21:34

## 2025-05-17 RX ADMIN — MONTELUKAST 10 MG: 10 TABLET, FILM COATED ORAL at 10:00

## 2025-05-17 RX ADMIN — CEFEPIME 2000 MG: 2 INJECTION, POWDER, FOR SOLUTION INTRAVENOUS at 04:00

## 2025-05-17 RX ADMIN — CEFEPIME 2000 MG: 2 INJECTION, POWDER, FOR SOLUTION INTRAVENOUS at 16:45

## 2025-05-17 RX ADMIN — SODIUM CHLORIDE, PRESERVATIVE FREE 10 ML: 5 INJECTION INTRAVENOUS at 21:34

## 2025-05-17 RX ADMIN — ASPIRIN 81 MG: 81 TABLET, CHEWABLE ORAL at 10:00

## 2025-05-17 RX ADMIN — ATORVASTATIN CALCIUM 80 MG: 80 TABLET, FILM COATED ORAL at 21:34

## 2025-05-17 RX ADMIN — BACLOFEN 20 MG: 20 TABLET ORAL at 10:00

## 2025-05-17 RX ADMIN — BACLOFEN 20 MG: 20 TABLET ORAL at 21:34

## 2025-05-17 RX ADMIN — GABAPENTIN 250 MG: 250 SOLUTION ORAL at 21:59

## 2025-05-17 RX ADMIN — BACLOFEN 20 MG: 20 TABLET ORAL at 14:18

## 2025-05-17 RX ADMIN — BUSPIRONE HYDROCHLORIDE 5 MG: 5 TABLET ORAL at 10:00

## 2025-05-17 RX ADMIN — SODIUM CHLORIDE, PRESERVATIVE FREE 10 ML: 5 INJECTION INTRAVENOUS at 10:01

## 2025-05-17 RX ADMIN — BUSPIRONE HYDROCHLORIDE 5 MG: 5 TABLET ORAL at 21:34

## 2025-05-17 RX ADMIN — CITALOPRAM HYDROBROMIDE 40 MG: 10 TABLET ORAL at 21:34

## 2025-05-17 RX ADMIN — TRAZODONE HYDROCHLORIDE 100 MG: 50 TABLET ORAL at 21:34

## 2025-05-17 NOTE — PROGRESS NOTES
SUBJECTIVE:        ROS:  The rest of the 14 point ROS negative    PHYSICAL EXAM: VSS per facility record      ASSESSMENT & PLAN:   Diagnosis Orders   1. Chronic bronchitis, unspecified chronic bronchitis type (HCC)        2. Neuropathy        3. Moderate episode of recurrent major depressive disorder (HCC)                      Past Medical History:   Diagnosis Date    Anxiety and depression     Cerebral artery occlusion with cerebral infarction (HCC)     COPD (chronic obstructive pulmonary disease) (HCC)     GERD (gastroesophageal reflux disease)     Hypertension     PTSD (post-traumatic stress disorder)          Past Surgical History:   Procedure Laterality Date     SECTION      x3    NASAL SINUS SURGERY      TONSILLECTOMY      TUBAL LIGATION      x2, first attempt failed         No current facility-administered medications on file prior to visit.     Current Outpatient Medications on File Prior to Visit   Medication Sig Dispense Refill    albuterol (PROVENTIL) (2.5 MG/3ML) 0.083% nebulizer solution Take 3 mLs by nebulization 2 times daily Indications: Chronic Obstructive Lung Disease      aspirin 81 MG chewable tablet 1 tablet by Per G Tube route daily Indications: Prevention of Unwanted Clot in Veins      atorvastatin (LIPITOR) 80 MG tablet 1 tablet by Per G Tube route nightly Indications: High Amount of Fats in the Blood      baclofen (LIORESAL) 20 MG tablet 1 tablet by Per G Tube route 3 times daily Indications: Muscle Spasm      citalopram (CELEXA) 40 MG tablet 1 tablet by Per G Tube route nightly Indications: Depression      famotidine (PEPCID) 20 MG tablet 1 tablet by Per G Tube route nightly Indications: Gastroesophageal Reflux Disease      fluticasone (FLONASE) 50 MCG/ACT nasal spray 1 spray by Each Nostril route daily Indications: Allergy      gabapentin (NEURONTIN) 250 MG/5ML solution 5 mLs by Per G Tube route nightly. Indications: Nerve Disease      loratadine (CLARITIN) 10 MG tablet 1 tablet by

## 2025-05-17 NOTE — PLAN OF CARE
Problem: Chronic Conditions and Co-morbidities  Goal: Patient's chronic conditions and co-morbidity symptoms are monitored and maintained or improved  5/16/2025 2135 by Dilia Freitas RN  Outcome: Progressing  5/16/2025 1139 by Jeronimo Rivera RN  Outcome: Progressing     Problem: Discharge Planning  Goal: Discharge to home or other facility with appropriate resources  5/16/2025 2135 by Dilia Freitas RN  Outcome: Progressing  5/16/2025 1139 by Jeronimo Rivera RN  Outcome: Progressing  Flowsheets (Taken 5/16/2025 0955)  Discharge to home or other facility with appropriate resources: Identify barriers to discharge with patient and caregiver     Problem: Pain  Goal: Verbalizes/displays adequate comfort level or baseline comfort level  5/16/2025 2135 by Dilia Freitas RN  Outcome: Progressing  5/16/2025 1139 by Jeronimo Rivera RN  Outcome: Progressing     Problem: Skin/Tissue Integrity  Goal: Skin integrity remains intact  Description: 1.  Monitor for areas of redness and/or skin breakdown2.  Assess vascular access sites hourly3.  Every 4-6 hours minimum:  Change oxygen saturation probe site4.  Every 4-6 hours:  If on nasal continuous positive airway pressure, respiratory therapy assess nares and determine need for appliance change or resting period  5/16/2025 2135 by Dilia Freitas RN  Outcome: Progressing  5/16/2025 1139 by Jeronimo Rivera RN  Outcome: Progressing  Flowsheets  Taken 5/16/2025 1138  Skin Integrity Remains Intact: Monitor for areas of redness and/or skin breakdown  Taken 5/16/2025 0955  Skin Integrity Remains Intact: Monitor for areas of redness and/or skin breakdown     Problem: Safety - Adult  Goal: Free from fall injury  5/16/2025 2135 by Dilia Freitas RN  Outcome: Progressing  5/16/2025 1139 by Jeronimo Rivera RN  Outcome: Progressing

## 2025-05-18 PROCEDURE — 2580000003 HC RX 258: Performed by: STUDENT IN AN ORGANIZED HEALTH CARE EDUCATION/TRAINING PROGRAM

## 2025-05-18 PROCEDURE — 6370000000 HC RX 637 (ALT 250 FOR IP): Performed by: FAMILY MEDICINE

## 2025-05-18 PROCEDURE — 96366 THER/PROPH/DIAG IV INF ADDON: CPT

## 2025-05-18 PROCEDURE — 2500000003 HC RX 250 WO HCPCS: Performed by: STUDENT IN AN ORGANIZED HEALTH CARE EDUCATION/TRAINING PROGRAM

## 2025-05-18 PROCEDURE — 6370000000 HC RX 637 (ALT 250 FOR IP): Performed by: STUDENT IN AN ORGANIZED HEALTH CARE EDUCATION/TRAINING PROGRAM

## 2025-05-18 PROCEDURE — G0378 HOSPITAL OBSERVATION PER HR: HCPCS

## 2025-05-18 PROCEDURE — 6360000002 HC RX W HCPCS: Performed by: STUDENT IN AN ORGANIZED HEALTH CARE EDUCATION/TRAINING PROGRAM

## 2025-05-18 RX ORDER — LISINOPRIL 20 MG/1
20 TABLET ORAL DAILY
Status: DISCONTINUED | OUTPATIENT
Start: 2025-05-18 | End: 2025-05-20 | Stop reason: HOSPADM

## 2025-05-18 RX ORDER — NIFEDIPINE 30 MG/1
30 TABLET, EXTENDED RELEASE ORAL DAILY
Status: DISCONTINUED | OUTPATIENT
Start: 2025-05-18 | End: 2025-05-18

## 2025-05-18 RX ADMIN — TRAZODONE HYDROCHLORIDE 100 MG: 50 TABLET ORAL at 20:22

## 2025-05-18 RX ADMIN — ASPIRIN 81 MG: 81 TABLET, CHEWABLE ORAL at 08:10

## 2025-05-18 RX ADMIN — BACLOFEN 20 MG: 20 TABLET ORAL at 20:22

## 2025-05-18 RX ADMIN — FAMOTIDINE 20 MG: 20 TABLET, FILM COATED ORAL at 20:22

## 2025-05-18 RX ADMIN — BUSPIRONE HYDROCHLORIDE 5 MG: 5 TABLET ORAL at 08:10

## 2025-05-18 RX ADMIN — SENNOSIDES 17.2 MG: 8.6 TABLET, FILM COATED ORAL at 20:22

## 2025-05-18 RX ADMIN — BACLOFEN 20 MG: 20 TABLET ORAL at 08:10

## 2025-05-18 RX ADMIN — BACLOFEN 20 MG: 20 TABLET ORAL at 13:53

## 2025-05-18 RX ADMIN — ATORVASTATIN CALCIUM 80 MG: 80 TABLET, FILM COATED ORAL at 20:22

## 2025-05-18 RX ADMIN — CITALOPRAM HYDROBROMIDE 40 MG: 10 TABLET ORAL at 20:21

## 2025-05-18 RX ADMIN — MONTELUKAST 10 MG: 10 TABLET, FILM COATED ORAL at 08:10

## 2025-05-18 RX ADMIN — CEFEPIME 2000 MG: 2 INJECTION, POWDER, FOR SOLUTION INTRAVENOUS at 04:33

## 2025-05-18 RX ADMIN — BUSPIRONE HYDROCHLORIDE 5 MG: 5 TABLET ORAL at 20:22

## 2025-05-18 RX ADMIN — SODIUM CHLORIDE, PRESERVATIVE FREE 10 ML: 5 INJECTION INTRAVENOUS at 20:23

## 2025-05-18 RX ADMIN — GABAPENTIN 250 MG: 250 SOLUTION ORAL at 21:27

## 2025-05-18 RX ADMIN — SODIUM CHLORIDE, PRESERVATIVE FREE 10 ML: 5 INJECTION INTRAVENOUS at 08:11

## 2025-05-18 RX ADMIN — LISINOPRIL 20 MG: 20 TABLET ORAL at 13:53

## 2025-05-18 RX ADMIN — CEFEPIME 2000 MG: 2 INJECTION, POWDER, FOR SOLUTION INTRAVENOUS at 16:30

## 2025-05-18 ASSESSMENT — PAIN SCALES - GENERAL: PAINLEVEL_OUTOF10: 0

## 2025-05-18 NOTE — PLAN OF CARE
Problem: Chronic Conditions and Co-morbidities  Goal: Patient's chronic conditions and co-morbidity symptoms are monitored and maintained or improved  5/17/2025 2342 by Madiha Partida RN  Outcome: Progressing  5/17/2025 1530 by Sarai Scruggs RN  Outcome: Progressing     Problem: Discharge Planning  Goal: Discharge to home or other facility with appropriate resources  5/17/2025 2342 by Madiha Partida RN  Outcome: Progressing  5/17/2025 1530 by Sarai Scruggs RN  Outcome: Progressing     Problem: Pain  Goal: Verbalizes/displays adequate comfort level or baseline comfort level  5/17/2025 2342 by Madiha Partida RN  Outcome: Progressing  5/17/2025 1530 by Sarai Scruggs RN  Outcome: Progressing     Problem: Skin/Tissue Integrity  Goal: Skin integrity remains intact  Description: 1.  Monitor for areas of redness and/or skin breakdown2.  Assess vascular access sites hourly3.  Every 4-6 hours minimum:  Change oxygen saturation probe site4.  Every 4-6 hours:  If on nasal continuous positive airway pressure, respiratory therapy assess nares and determine need for appliance change or resting period  5/17/2025 2342 by Madiha Partida RN  Outcome: Progressing  5/17/2025 1530 by Sarai Scruggs RN  Outcome: Progressing     Problem: Safety - Adult  Goal: Free from fall injury  5/17/2025 2342 by Madiha Partida RN  Outcome: Progressing  5/17/2025 1530 by Sarai Scruggs RN  Outcome: Progressing

## 2025-05-19 LAB
ANION GAP SERPL CALCULATED.3IONS-SCNC: 10 MEQ/L (ref 9–15)
BACTERIA BLD CULT ORG #2: NORMAL
BACTERIA BLD CULT: NORMAL
BUN SERPL-MCNC: 13 MG/DL (ref 6–20)
CALCIUM SERPL-MCNC: 8.5 MG/DL (ref 8.5–9.9)
CHLORIDE SERPL-SCNC: 103 MEQ/L (ref 95–107)
CO2 SERPL-SCNC: 25 MEQ/L (ref 20–31)
CREAT SERPL-MCNC: 0.4 MG/DL (ref 0.5–0.9)
GLUCOSE SERPL-MCNC: 95 MG/DL (ref 70–99)
POTASSIUM SERPL-SCNC: 4.4 MEQ/L (ref 3.4–4.9)
SODIUM SERPL-SCNC: 138 MEQ/L (ref 135–144)

## 2025-05-19 PROCEDURE — 96366 THER/PROPH/DIAG IV INF ADDON: CPT

## 2025-05-19 PROCEDURE — 6370000000 HC RX 637 (ALT 250 FOR IP): Performed by: INTERNAL MEDICINE

## 2025-05-19 PROCEDURE — 2580000003 HC RX 258: Performed by: STUDENT IN AN ORGANIZED HEALTH CARE EDUCATION/TRAINING PROGRAM

## 2025-05-19 PROCEDURE — 6360000002 HC RX W HCPCS: Performed by: STUDENT IN AN ORGANIZED HEALTH CARE EDUCATION/TRAINING PROGRAM

## 2025-05-19 PROCEDURE — 6370000000 HC RX 637 (ALT 250 FOR IP): Performed by: STUDENT IN AN ORGANIZED HEALTH CARE EDUCATION/TRAINING PROGRAM

## 2025-05-19 PROCEDURE — 80048 BASIC METABOLIC PNL TOTAL CA: CPT

## 2025-05-19 PROCEDURE — 36415 COLL VENOUS BLD VENIPUNCTURE: CPT

## 2025-05-19 PROCEDURE — 6370000000 HC RX 637 (ALT 250 FOR IP): Performed by: FAMILY MEDICINE

## 2025-05-19 PROCEDURE — G0378 HOSPITAL OBSERVATION PER HR: HCPCS

## 2025-05-19 PROCEDURE — 2500000003 HC RX 250 WO HCPCS: Performed by: STUDENT IN AN ORGANIZED HEALTH CARE EDUCATION/TRAINING PROGRAM

## 2025-05-19 RX ORDER — BUSPIRONE HYDROCHLORIDE 5 MG/1
5 TABLET ORAL 2 TIMES DAILY
DISCHARGE
Start: 2025-05-19

## 2025-05-19 RX ORDER — NEOMYCIN/BACITRACIN/POLYMYXINB 3.5-400-5K
OINTMENT (GRAM) TOPICAL 2 TIMES DAILY
Status: DISCONTINUED | OUTPATIENT
Start: 2025-05-19 | End: 2025-05-20 | Stop reason: HOSPADM

## 2025-05-19 RX ORDER — LISINOPRIL 20 MG/1
20 TABLET ORAL DAILY
DISCHARGE
Start: 2025-05-20

## 2025-05-19 RX ORDER — NEOMYCIN/BACITRACIN/POLYMYXINB 3.5-400-5K
OINTMENT (GRAM) TOPICAL
DISCHARGE
Start: 2025-05-19

## 2025-05-19 RX ADMIN — MONTELUKAST 10 MG: 10 TABLET, FILM COATED ORAL at 08:29

## 2025-05-19 RX ADMIN — ASPIRIN 81 MG: 81 TABLET, CHEWABLE ORAL at 08:29

## 2025-05-19 RX ADMIN — BACLOFEN 20 MG: 20 TABLET ORAL at 13:29

## 2025-05-19 RX ADMIN — FAMOTIDINE 20 MG: 20 TABLET, FILM COATED ORAL at 22:44

## 2025-05-19 RX ADMIN — CEFEPIME 2000 MG: 2 INJECTION, POWDER, FOR SOLUTION INTRAVENOUS at 04:36

## 2025-05-19 RX ADMIN — GABAPENTIN 250 MG: 250 SOLUTION ORAL at 22:45

## 2025-05-19 RX ADMIN — BUSPIRONE HYDROCHLORIDE 5 MG: 5 TABLET ORAL at 08:29

## 2025-05-19 RX ADMIN — SENNOSIDES 17.2 MG: 8.6 TABLET, FILM COATED ORAL at 22:44

## 2025-05-19 RX ADMIN — CITALOPRAM HYDROBROMIDE 40 MG: 10 TABLET ORAL at 22:43

## 2025-05-19 RX ADMIN — TRAZODONE HYDROCHLORIDE 100 MG: 50 TABLET ORAL at 22:44

## 2025-05-19 RX ADMIN — BISACODYL 10 MG: 10 SUPPOSITORY RECTAL at 14:58

## 2025-05-19 RX ADMIN — LISINOPRIL 20 MG: 20 TABLET ORAL at 08:29

## 2025-05-19 RX ADMIN — BACITRACIN ZINC, NEOMYCIN, POLYMYXIN B 1 G: 400; 3.5; 5 OINTMENT TOPICAL at 13:28

## 2025-05-19 RX ADMIN — BACLOFEN 20 MG: 20 TABLET ORAL at 08:29

## 2025-05-19 RX ADMIN — SODIUM CHLORIDE, PRESERVATIVE FREE 10 ML: 5 INJECTION INTRAVENOUS at 22:45

## 2025-05-19 RX ADMIN — BUSPIRONE HYDROCHLORIDE 5 MG: 5 TABLET ORAL at 22:45

## 2025-05-19 RX ADMIN — ATORVASTATIN CALCIUM 80 MG: 80 TABLET, FILM COATED ORAL at 22:45

## 2025-05-19 RX ADMIN — BACLOFEN 20 MG: 20 TABLET ORAL at 22:44
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
## 2025-05-19 NOTE — CARE COORDINATION
Chart reviewed this am. Patient resting in bed on room air.  Patient awaiting authorization for [facility].
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
## 2025-05-19 NOTE — CARE COORDINATION
This LSW visited patient at bedside this am. I notified her that I received a message from Titoadmissions liaison at [facility].   Per Tito, patients insurance is asking for updated PT,OT notes.  I have notified PT and OT with request.  Patient to transfer to [facility] once patients insurance has given authorization.  Electronically signed by SANTIAGO Tidwell on 5/19/25 at 11:34 AM EDT   This LSW was notified that patient is at baseline for PT and not put on PT program.  Electronically signed by SANTIAGO Tidwell on 5/19/25 at 11:38 AM EDT   This LSW was notified by TRESA BARCLAY OT that patient was not picked up by OT for program.  Electronically signed by SANTIAGO Tidwell on 5/19/25 at 12:07 PM EDT   I spoke with patient at bedside again this afternoon. I notified patient that she may be denied by insurance for SNF placement.  Patient requesting to have [facility] apply for long term placement for patient. I called and left detailed message for Tito admissions liaison at [facility], at: [phone] asking him to apply for long term care for patient with her medicaid benefit.  Awaiting a return call at this time.   Electronically signed by SANTIAGO Tidwell on 5/19/25 at 12:14 PM EDT   This LSW completed PASSAR for patient to be transferred to [facility]. I also have faxed this to [facility] intake at: [phone].

## 2025-05-19 NOTE — CARE COORDINATION
On 5/19/25 I vd call from [facility] and she wanted it known she did pay a visit to SNF [facility] and spoke with other residents as well about any complaints. Conclusion was best she select another facility.     She inquired what dispo was and informed she is going to a different facility.     Electronically signed by Amanda Turner, RN, BSN on 5/19/2025 at 4:22 PM

## 2025-05-19 NOTE — FLOWSHEET NOTE
Patient assisted to reposition in bed, she uses texting to communicate her needs. Apical pulse regular,no jugular vein distention,lungs diminished,Gastrostomy Tube  insertion site located below her Xyphoid process without any foul odor ,right dorsum Iv intact,no edema.no burning or pain during or after urination,external catheter in place.

## 2025-05-19 NOTE — CARE COORDINATION
This LSW received notification that patient is able to be transferred to [facility] on May 20,2025.  I arranged transportation via [facility] service for 11:00am.  Patient, Jaylene,TAY and Tito at [facility] are all aware.  Electronically signed by SANTIAGO Tidwell on 5/19/25 at 4:19 PM EDT   PASSAR completed for SNF transfer.  Electronically signed by SANTIAGO Tidwell on 5/19/25 at 4:19 PM EDT

## 2025-05-20 VITALS
DIASTOLIC BLOOD PRESSURE: 78 MMHG | TEMPERATURE: 97.5 F | RESPIRATION RATE: 18 BRPM | OXYGEN SATURATION: 99 % | SYSTOLIC BLOOD PRESSURE: 117 MMHG | HEIGHT: 62 IN | HEART RATE: 65 BPM | BODY MASS INDEX: 39.42 KG/M2 | WEIGHT: 214.2 LBS

## 2025-05-20 LAB
GLUCOSE BLD-MCNC: 96 MG/DL (ref 70–99)
PERFORMED ON: NORMAL

## 2025-05-20 PROCEDURE — 6370000000 HC RX 637 (ALT 250 FOR IP): Performed by: INTERNAL MEDICINE

## 2025-05-20 PROCEDURE — G0378 HOSPITAL OBSERVATION PER HR: HCPCS

## 2025-05-20 PROCEDURE — 6370000000 HC RX 637 (ALT 250 FOR IP): Performed by: FAMILY MEDICINE

## 2025-05-20 PROCEDURE — 2500000003 HC RX 250 WO HCPCS: Performed by: STUDENT IN AN ORGANIZED HEALTH CARE EDUCATION/TRAINING PROGRAM

## 2025-05-20 PROCEDURE — 6370000000 HC RX 637 (ALT 250 FOR IP): Performed by: STUDENT IN AN ORGANIZED HEALTH CARE EDUCATION/TRAINING PROGRAM

## 2025-05-20 RX ADMIN — LISINOPRIL 20 MG: 20 TABLET ORAL at 08:15

## 2025-05-20 RX ADMIN — BUSPIRONE HYDROCHLORIDE 5 MG: 5 TABLET ORAL at 08:16

## 2025-05-20 RX ADMIN — SODIUM CHLORIDE, PRESERVATIVE FREE 10 ML: 5 INJECTION INTRAVENOUS at 08:19

## 2025-05-20 RX ADMIN — BACLOFEN 20 MG: 20 TABLET ORAL at 08:15

## 2025-05-20 RX ADMIN — ASPIRIN 81 MG: 81 TABLET, CHEWABLE ORAL at 08:16

## 2025-05-20 RX ADMIN — BACITRACIN ZINC, NEOMYCIN, POLYMYXIN B 1 G: 400; 3.5; 5 OINTMENT TOPICAL at 08:19

## 2025-05-20 RX ADMIN — MONTELUKAST 10 MG: 10 TABLET, FILM COATED ORAL at 08:16

## 2025-05-20 NOTE — CARE COORDINATION
This LSW visited patient this am.  Patient to transfer to [facility] today, 5/20/2025.  [facility] service to transfer patient.  Electronically signed by SANTIAGO Tidwell on 5/20/25 at 11:24 AM EDT

## 2025-05-20 NOTE — PLAN OF CARE
Problem: Chronic Conditions and Co-morbidities  Goal: Patient's chronic conditions and co-morbidity symptoms are monitored and maintained or improved  5/20/2025 1100 by Chica Crowell RN  Outcome: Progressing  5/20/2025 0101 by Massiel Tillman RN  Outcome: Progressing     Problem: Discharge Planning  Goal: Discharge to home or other facility with appropriate resources  5/20/2025 1100 by Chica Crowell RN  Outcome: Progressing  5/20/2025 0101 by Massiel Tilmlan RN  Outcome: Progressing     Problem: Pain  Goal: Verbalizes/displays adequate comfort level or baseline comfort level  5/20/2025 1100 by Chica Crowell RN  Outcome: Progressing  5/20/2025 0101 by Massiel Tillman RN  Outcome: Progressing     Problem: Skin/Tissue Integrity  Goal: Skin integrity remains intact  Description: 1.  Monitor for areas of redness and/or skin breakdown2.  Assess vascular access sites hourly3.  Every 4-6 hours minimum:  Change oxygen saturation probe site4.  Every 4-6 hours:  If on nasal continuous positive airway pressure, respiratory therapy assess nares and determine need for appliance change or resting period  5/20/2025 1100 by Chica Crowell RN  Outcome: Progressing  5/20/2025 0101 by Massiel Tillman RN  Outcome: Progressing     Problem: Safety - Adult  Goal: Free from fall injury  5/20/2025 1100 by Chica Crowell RN  Outcome: Progressing  5/20/2025 0101 by Massiel Tillman RN  Outcome: Progressing

## 2025-05-22 ENCOUNTER — NURSING HOME VISIT (OUTPATIENT)
Dept: POST ACUTE CARE | Facility: EXTERNAL LOCATION | Age: 55
End: 2025-05-22
Payer: COMMERCIAL

## 2025-05-22 DIAGNOSIS — Z86.73 HISTORY OF CARDIOEMBOLIC CEREBROVASCULAR ACCIDENT (CVA): ICD-10-CM

## 2025-05-22 DIAGNOSIS — N30.00 ACUTE CYSTITIS WITHOUT HEMATURIA: ICD-10-CM

## 2025-05-22 DIAGNOSIS — Z75.8 DISCHARGE PLANNING ISSUES: ICD-10-CM

## 2025-05-22 DIAGNOSIS — G82.50 SPASTIC QUADRIPARESIS (MULTI): Primary | ICD-10-CM

## 2025-05-22 DIAGNOSIS — I10 HYPERTENSION, UNSPECIFIED TYPE: ICD-10-CM

## 2025-05-22 PROBLEM — J44.9 COPD, MILD (MULTI): Status: RESOLVED | Noted: 2023-11-03 | Resolved: 2025-05-22

## 2025-05-22 PROBLEM — J44.9 COPD (CHRONIC OBSTRUCTIVE PULMONARY DISEASE) (MULTI): Status: RESOLVED | Noted: 2023-11-21 | Resolved: 2025-05-22

## 2025-05-22 PROBLEM — G83.9: Status: RESOLVED | Noted: 2024-06-19 | Resolved: 2025-05-22

## 2025-05-22 PROCEDURE — 99306 1ST NF CARE HIGH MDM 50: CPT | Performed by: STUDENT IN AN ORGANIZED HEALTH CARE EDUCATION/TRAINING PROGRAM

## 2025-05-22 ASSESSMENT — ENCOUNTER SYMPTOMS
MUSCULOSKELETAL NEGATIVE: 1
CARDIOVASCULAR NEGATIVE: 1
PSYCHIATRIC NEGATIVE: 1
WEAKNESS: 1
FATIGUE: 1
GASTROINTESTINAL NEGATIVE: 1
RESPIRATORY NEGATIVE: 1
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
## 2025-05-29 ENCOUNTER — NURSING HOME VISIT (OUTPATIENT)
Dept: POST ACUTE CARE | Facility: EXTERNAL LOCATION | Age: 55
End: 2025-05-29
Payer: COMMERCIAL

## 2025-05-29 DIAGNOSIS — J18.9 PNEUMONIA DUE TO INFECTIOUS ORGANISM, UNSPECIFIED LATERALITY, UNSPECIFIED PART OF LUNG: ICD-10-CM

## 2025-05-29 DIAGNOSIS — E78.2 MIXED HYPERLIPIDEMIA: ICD-10-CM

## 2025-05-29 DIAGNOSIS — F41.9 ANXIETY: ICD-10-CM

## 2025-05-29 DIAGNOSIS — I10 HYPERTENSION, UNSPECIFIED TYPE: Primary | ICD-10-CM

## 2025-05-29 PROCEDURE — 99309 SBSQ NF CARE MODERATE MDM 30: CPT | Performed by: STUDENT IN AN ORGANIZED HEALTH CARE EDUCATION/TRAINING PROGRAM

## 2025-05-29 ASSESSMENT — ENCOUNTER SYMPTOMS
GASTROINTESTINAL NEGATIVE: 1
FATIGUE: 1
MUSCULOSKELETAL NEGATIVE: 1
CARDIOVASCULAR NEGATIVE: 1
WEAKNESS: 1
RESPIRATORY NEGATIVE: 1
PSYCHIATRIC NEGATIVE: 1

## 2025-05-30 NOTE — PROGRESS NOTES
Subjective   Patient ID: Idalia Ramsey is a 54 y.o. female.    patient seen on routine follow-up.  Regards that she is overall doing well, unfortunately her surgery was canceled lately, and she endorses that she was told that she has pneumonia.  Does have some slight cough and congestion as of late however recent chest x-ray was negative.  Otherwise, states no acute issues or concerns therapy is overall going well, no issues at the facility.         Review of Systems   Constitutional:  Positive for fatigue.   HENT: Negative.     Respiratory: Negative.     Cardiovascular: Negative.    Gastrointestinal: Negative.    Musculoskeletal: Negative.    Neurological:  Positive for weakness.   Psychiatric/Behavioral: Negative.         Objective Vitals Reviewed via facility EMR   Physical Exam  Constitutional:       General: She is not in acute distress.     Appearance: She is not ill-appearing.   Eyes:      Pupils: Pupils are equal, round, and reactive to light.   Cardiovascular:      Rate and Rhythm: Normal rate and regular rhythm.      Pulses: Normal pulses.      Heart sounds: No murmur heard.  Pulmonary:      Effort: No respiratory distress.      Breath sounds: No wheezing.   Abdominal:      General: Abdomen is flat. Bowel sounds are normal. There is no distension.   Musculoskeletal:      Right lower leg: No edema.      Left lower leg: No edema.   Skin:     General: Skin is warm and dry.   Neurological:      Mental Status: She is alert. Mental status is at baseline.      Cranial Nerves: No cranial nerve deficit.      Motor: Weakness present.   Psychiatric:         Mood and Affect: Mood normal.         Behavior: Behavior normal.         Assessment/Plan   Diagnoses and all orders for this visit:  Hypertension, unspecified type  Mixed hyperlipidemia  Anxiety  Pneumonia due to infectious organism, unspecified laterality, unspecified part of lung    Patient seen and examined at bedside.  Overall medically stable, will treat  prophylactically for pneumonia due to underlying symptoms.  Check procalcitonin to rule out underlying pneumonia if negative discontinue antibiotics.  Otherwise continue supportive care.  No additional issues.  Discussed care plan with staff and TARUN.     Reviewed and approved by LUISA RILEY on 5/29/25 at 10:52 PM.

## 2025-06-03 LAB
EKG ATRIAL RATE: 84 BPM
EKG DIAGNOSIS: NORMAL
EKG P AXIS: 19 DEGREES
EKG P-R INTERVAL: 180 MS
EKG Q-T INTERVAL: 378 MS
EKG QRS DURATION: 70 MS
EKG QTC CALCULATION (BAZETT): 446 MS
EKG R AXIS: -30 DEGREES
EKG T AXIS: 19 DEGREES
EKG VENTRICULAR RATE: 84 BPM

## 2025-06-13 ENCOUNTER — NURSING HOME VISIT (OUTPATIENT)
Dept: PRIMARY CARE | Facility: CLINIC | Age: 55
End: 2025-06-13
Payer: COMMERCIAL

## 2025-06-13 DIAGNOSIS — J18.9 PNEUMONIA DUE TO INFECTIOUS ORGANISM, UNSPECIFIED LATERALITY, UNSPECIFIED PART OF LUNG: ICD-10-CM

## 2025-06-13 DIAGNOSIS — I10 HYPERTENSION, UNSPECIFIED TYPE: Primary | ICD-10-CM

## 2025-06-13 DIAGNOSIS — Z86.73 S/P STROKE DUE TO CEREBROVASCULAR DISEASE: ICD-10-CM

## 2025-06-14 ASSESSMENT — ENCOUNTER SYMPTOMS
WEAKNESS: 1
GASTROINTESTINAL NEGATIVE: 1
CONSTITUTIONAL NEGATIVE: 1
RESPIRATORY NEGATIVE: 1
CARDIOVASCULAR NEGATIVE: 1
MUSCULOSKELETAL NEGATIVE: 1
PSYCHIATRIC NEGATIVE: 1

## 2025-06-14 NOTE — PROGRESS NOTES
Subjective   Patient ID: Idalia Ramsey is a 54 y.o. female.    Patient seen and examined on routine evaluation.  There were underlying concerns of pneumonia going on for the patient.  However, she did complete treatment, and repeat chest x-ray showed no acute pulm process.  Otherwise, has been working well with physical therapy is hopeful for getting surgical procedure upcoming in the future.  Denies any additional issues or concerns.       Review of Systems   Constitutional: Negative.    HENT: Negative.     Respiratory: Negative.     Cardiovascular: Negative.    Gastrointestinal: Negative.    Musculoskeletal: Negative.    Neurological:  Positive for weakness.   Psychiatric/Behavioral: Negative.         Objective Vitals Reviewed via facility EMR   Physical Exam  Constitutional:       General: She is not in acute distress.     Appearance: She is not ill-appearing.   Eyes:      Pupils: Pupils are equal, round, and reactive to light.   Cardiovascular:      Rate and Rhythm: Normal rate and regular rhythm.      Pulses: Normal pulses.      Heart sounds: No murmur heard.  Pulmonary:      Effort: No respiratory distress.      Breath sounds: No wheezing.      Comments: Improved breath sounds, no concern for pna at this time  Abdominal:      General: Abdomen is flat. Bowel sounds are normal. There is no distension.   Musculoskeletal:      Right lower leg: No edema.      Left lower leg: No edema.   Skin:     General: Skin is warm and dry.   Neurological:      Mental Status: She is alert. Mental status is at baseline.      Cranial Nerves: No cranial nerve deficit.      Motor: Weakness present.   Psychiatric:         Mood and Affect: Mood normal.         Behavior: Behavior normal.       Assessment/Plan   Diagnoses and all orders for this visit:  Hypertension, unspecified type  Pneumonia due to infectious organism, unspecified laterality, unspecified part of lung  S/P stroke due to cerebrovascular disease      Patient seen  and examined at bedside.  Overall medically stable, repeat chest x-ray showed no acute pathology.  She likely has been fully treated for her pneumonia, or pulmonary process.  Would be medically optimized for surgical procedure as she is not on room air, and is at her baseline.  Continue to closely monitor symptoms.  Otherwise continue supportive care no additional issues or concerns.     Reviewed and approved by LUISA RILEY on 6/14/25 at 4:39 PM.

## 2025-06-26 ENCOUNTER — NURSING HOME VISIT (OUTPATIENT)
Dept: POST ACUTE CARE | Facility: EXTERNAL LOCATION | Age: 55
End: 2025-06-26
Payer: COMMERCIAL

## 2025-06-26 DIAGNOSIS — R05.3 CHRONIC COUGH: ICD-10-CM

## 2025-06-26 DIAGNOSIS — I10 HYPERTENSION, UNSPECIFIED TYPE: Primary | ICD-10-CM

## 2025-06-26 DIAGNOSIS — F41.9 ANXIETY: ICD-10-CM

## 2025-06-26 DIAGNOSIS — R09.82 PND (POST-NASAL DRIP): ICD-10-CM

## 2025-06-26 DIAGNOSIS — K21.9 GASTROESOPHAGEAL REFLUX DISEASE, UNSPECIFIED WHETHER ESOPHAGITIS PRESENT: ICD-10-CM

## 2025-06-26 DIAGNOSIS — K21.9 GASTROESOPHAGEAL REFLUX DISEASE WITHOUT ESOPHAGITIS: ICD-10-CM

## 2025-06-26 PROCEDURE — 99309 SBSQ NF CARE MODERATE MDM 30: CPT | Performed by: STUDENT IN AN ORGANIZED HEALTH CARE EDUCATION/TRAINING PROGRAM

## 2025-06-26 NOTE — LETTER
Patient: Idalia Ramsey  : 1970    Encounter Date: 2025    Subjective  Patient ID: Idalia Ramsey is a 54 y.o. female.    Patient seen and examined on routine evaluation.  Regards that she is overall doing well without any acute issues or concerns well however her congestion and cough continues to be an issue.  She regards no fevers or chills or constitutional symptoms, shortness of breath or cyclic symptoms however states that the cough is persisting.  She finds that lying down does seem to make this worse.  Otherwise, states she is overall doing well from a therapy standpoint.  Still planning on surgery in the future.  Otherwise no additional issues.         Review of Systems   Constitutional:  Positive for fatigue.   HENT: Negative.     Respiratory: Negative.     Cardiovascular: Negative.    Gastrointestinal: Negative.    Musculoskeletal: Negative.    Neurological:  Positive for weakness.   Psychiatric/Behavioral: Negative.         ObjectiveVitals Reviewed via facility EMR   Physical Exam  Constitutional:       General: She is not in acute distress.     Appearance: She is not ill-appearing.   Eyes:      Pupils: Pupils are equal, round, and reactive to light.   Cardiovascular:      Rate and Rhythm: Normal rate and regular rhythm.      Pulses: Normal pulses.      Heart sounds: No murmur heard.  Pulmonary:      Effort: No respiratory distress.      Breath sounds: No wheezing.      Comments: cough  Abdominal:      General: Abdomen is flat. Bowel sounds are normal. There is no distension.   Musculoskeletal:      Right lower leg: No edema.      Left lower leg: No edema.   Skin:     General: Skin is warm and dry.   Neurological:      Mental Status: She is alert. Mental status is at baseline.      Cranial Nerves: No cranial nerve deficit.      Motor: Weakness present.   Psychiatric:         Mood and Affect: Mood normal.         Behavior: Behavior normal.         Assessment/Plan  Diagnoses and all  orders for this visit:  Hypertension, unspecified type  PND (post-nasal drip)  Anxiety  Gastroesophageal reflux disease, unspecified whether esophagitis present  Gastroesophageal reflux disease without esophagitis  Chronic cough    Patient seen and examined at bedside.  Overall medically stable however cough continues to be a persistent issue for the patient.  Could be allergy related however she is on appropriate regimen for this but she does note that lying down flat does make her symptoms worse.  Recommend initiation of PPI.  monitor symptoms while on this. Otherwise, patient would be medically stable for surgical procedure.  No additional issues at this time.     Reviewed and approved by STERLING RILEY on 6/28/25 at 12:53 PM.       Electronically Signed By: Sterling Riley DO   6/28/25 12:53 PM

## 2025-06-28 PROBLEM — K21.9 GASTROESOPHAGEAL REFLUX DISEASE WITHOUT ESOPHAGITIS: Status: ACTIVE | Noted: 2025-06-28

## 2025-06-28 PROBLEM — R05.3 CHRONIC COUGH: Status: ACTIVE | Noted: 2025-06-28

## 2025-06-28 ASSESSMENT — ENCOUNTER SYMPTOMS
CARDIOVASCULAR NEGATIVE: 1
RESPIRATORY NEGATIVE: 1
PSYCHIATRIC NEGATIVE: 1
FATIGUE: 1
MUSCULOSKELETAL NEGATIVE: 1
WEAKNESS: 1
GASTROINTESTINAL NEGATIVE: 1

## 2025-06-28 NOTE — PROGRESS NOTES
Subjective   Patient ID: Idalia Ramsey is a 54 y.o. female.    Patient seen and examined on routine evaluation.  Regards that she is overall doing well without any acute issues or concerns well however her congestion and cough continues to be an issue.  She regards no fevers or chills or constitutional symptoms, shortness of breath or cyclic symptoms however states that the cough is persisting.  She finds that lying down does seem to make this worse.  Otherwise, states she is overall doing well from a therapy standpoint.  Still planning on surgery in the future.  Otherwise no additional issues.         Review of Systems   Constitutional:  Positive for fatigue.   HENT: Negative.     Respiratory: Negative.     Cardiovascular: Negative.    Gastrointestinal: Negative.    Musculoskeletal: Negative.    Neurological:  Positive for weakness.   Psychiatric/Behavioral: Negative.         Objective Vitals Reviewed via facility EMR   Physical Exam  Constitutional:       General: She is not in acute distress.     Appearance: She is not ill-appearing.   Eyes:      Pupils: Pupils are equal, round, and reactive to light.   Cardiovascular:      Rate and Rhythm: Normal rate and regular rhythm.      Pulses: Normal pulses.      Heart sounds: No murmur heard.  Pulmonary:      Effort: No respiratory distress.      Breath sounds: No wheezing.      Comments: cough  Abdominal:      General: Abdomen is flat. Bowel sounds are normal. There is no distension.   Musculoskeletal:      Right lower leg: No edema.      Left lower leg: No edema.   Skin:     General: Skin is warm and dry.   Neurological:      Mental Status: She is alert. Mental status is at baseline.      Cranial Nerves: No cranial nerve deficit.      Motor: Weakness present.   Psychiatric:         Mood and Affect: Mood normal.         Behavior: Behavior normal.         Assessment/Plan   Diagnoses and all orders for this visit:  Hypertension, unspecified type  PND (post-nasal  drip)  Anxiety  Gastroesophageal reflux disease, unspecified whether esophagitis present  Gastroesophageal reflux disease without esophagitis  Chronic cough    Patient seen and examined at bedside.  Overall medically stable however cough continues to be a persistent issue for the patient.  Could be allergy related however she is on appropriate regimen for this but she does note that lying down flat does make her symptoms worse.  Recommend initiation of PPI.  monitor symptoms while on this. Otherwise, patient would be medically stable for surgical procedure.  No additional issues at this time.     Reviewed and approved by LUISA RILEY on 6/28/25 at 12:53 PM.

## 2025-07-17 ENCOUNTER — NURSING HOME VISIT (OUTPATIENT)
Dept: POST ACUTE CARE | Facility: EXTERNAL LOCATION | Age: 55
End: 2025-07-17
Payer: COMMERCIAL

## 2025-07-17 DIAGNOSIS — E78.2 MIXED HYPERLIPIDEMIA: ICD-10-CM

## 2025-07-17 DIAGNOSIS — K21.9 GASTROESOPHAGEAL REFLUX DISEASE WITHOUT ESOPHAGITIS: ICD-10-CM

## 2025-07-17 DIAGNOSIS — F41.9 ANXIETY: ICD-10-CM

## 2025-07-17 DIAGNOSIS — I10 HYPERTENSION, UNSPECIFIED TYPE: Primary | ICD-10-CM

## 2025-07-18 ASSESSMENT — ENCOUNTER SYMPTOMS
PSYCHIATRIC NEGATIVE: 1
RESPIRATORY NEGATIVE: 1
CARDIOVASCULAR NEGATIVE: 1
FATIGUE: 1
MUSCULOSKELETAL NEGATIVE: 1
NEUROLOGICAL NEGATIVE: 1
GASTROINTESTINAL NEGATIVE: 1

## 2025-07-18 NOTE — PROGRESS NOTES
Subjective   Patient ID: Idalia Ramsey is a 54 y.o. female.    Patient seen and examined on routine evaluation.  She regards that she is overall doing well with no acute issues or concerns.  Continues to work well with therapy, and Otherwise overall doing well.  States that her breathing status is at baseline.  Denies any additional issues.         Review of Systems   Constitutional:  Positive for fatigue.   HENT: Negative.     Respiratory: Negative.     Cardiovascular: Negative.    Gastrointestinal: Negative.    Musculoskeletal: Negative.    Neurological: Negative.    Psychiatric/Behavioral: Negative.         Objective Vitals Reviewed via facility EMR   Physical Exam  Constitutional:       General: She is not in acute distress.     Appearance: She is not ill-appearing.     Eyes:      Pupils: Pupils are equal, round, and reactive to light.       Cardiovascular:      Rate and Rhythm: Normal rate and regular rhythm.      Pulses: Normal pulses.      Heart sounds: No murmur heard.  Pulmonary:      Effort: No respiratory distress.      Breath sounds: No wheezing.   Abdominal:      General: Abdomen is flat. Bowel sounds are normal. There is no distension.     Musculoskeletal:      Right lower leg: No edema.      Left lower leg: No edema.     Skin:     General: Skin is warm and dry.     Neurological:      Mental Status: She is alert. Mental status is at baseline.      Cranial Nerves: No cranial nerve deficit.      Motor: Weakness present.     Psychiatric:         Mood and Affect: Mood normal.         Behavior: Behavior normal.         Assessment/Plan   Diagnoses and all orders for this visit:  Hypertension, unspecified type  Mixed hyperlipidemia  Gastroesophageal reflux disease without esophagitis  Anxiety    Patient seen examined at bedside.  Overall medically stable, continue supportive care.  Medically clear for surgical procedures.  Other vitals reviewed and no issues noted.  Continue supportive care.  Discussed  care plan with staff and no issues noted.     Reviewed and approved by LUISA RILEY on 7/18/25 at 2:48 PM.

## 2025-09-04 ENCOUNTER — NURSING HOME VISIT (OUTPATIENT)
Dept: POST ACUTE CARE | Facility: EXTERNAL LOCATION | Age: 55
End: 2025-09-04
Payer: COMMERCIAL

## 2025-09-04 DIAGNOSIS — K21.9 GASTROESOPHAGEAL REFLUX DISEASE WITHOUT ESOPHAGITIS: ICD-10-CM

## 2025-09-04 DIAGNOSIS — I10 HYPERTENSION, UNSPECIFIED TYPE: Primary | ICD-10-CM

## 2025-09-04 DIAGNOSIS — E78.2 MIXED HYPERLIPIDEMIA: ICD-10-CM

## 2025-09-04 DIAGNOSIS — F41.9 ANXIETY: ICD-10-CM

## 2025-09-04 PROCEDURE — 99309 SBSQ NF CARE MODERATE MDM 30: CPT | Performed by: STUDENT IN AN ORGANIZED HEALTH CARE EDUCATION/TRAINING PROGRAM

## 2025-09-04 ASSESSMENT — ENCOUNTER SYMPTOMS
PSYCHIATRIC NEGATIVE: 1
FATIGUE: 1
ABDOMINAL PAIN: 1
MUSCULOSKELETAL NEGATIVE: 1
NEUROLOGICAL NEGATIVE: 1
RESPIRATORY NEGATIVE: 1
CARDIOVASCULAR NEGATIVE: 1